# Patient Record
Sex: FEMALE | Race: WHITE | NOT HISPANIC OR LATINO | Employment: OTHER | URBAN - METROPOLITAN AREA
[De-identification: names, ages, dates, MRNs, and addresses within clinical notes are randomized per-mention and may not be internally consistent; named-entity substitution may affect disease eponyms.]

---

## 2023-02-28 PROBLEM — R05.8 PRODUCTIVE COUGH: Status: ACTIVE | Noted: 2023-02-28

## 2023-02-28 PROBLEM — R09.89 CHEST CONGESTION: Status: ACTIVE | Noted: 2023-02-28

## 2023-02-28 PROBLEM — E78.5 HYPERLIPIDEMIA: Status: ACTIVE | Noted: 2023-02-28

## 2023-02-28 PROBLEM — E03.9 HYPOTHYROIDISM: Status: ACTIVE | Noted: 2023-02-28

## 2023-02-28 PROBLEM — E11.9 DIABETES MELLITUS (MULTI): Status: ACTIVE | Noted: 2023-02-28

## 2023-02-28 PROBLEM — I25.10 CAD IN NATIVE ARTERY: Status: ACTIVE | Noted: 2023-02-28

## 2023-02-28 PROBLEM — I50.41 ACUTE COMBINED SYSTOLIC AND DIASTOLIC CONGESTIVE HEART FAILURE (MULTI): Status: ACTIVE | Noted: 2023-02-28

## 2023-02-28 PROBLEM — Z95.1 S/P CABG X 4: Status: ACTIVE | Noted: 2023-02-28

## 2023-02-28 PROBLEM — G47.33 OBSTRUCTIVE SLEEP APNEA (ADULT) (PEDIATRIC): Status: ACTIVE | Noted: 2023-02-28

## 2023-02-28 PROBLEM — I10 ESSENTIAL HYPERTENSION: Status: ACTIVE | Noted: 2023-02-28

## 2023-02-28 PROBLEM — H81.10 BPPV (BENIGN PAROXYSMAL POSITIONAL VERTIGO), UNSPECIFIED LATERALITY: Status: ACTIVE | Noted: 2023-02-28

## 2023-02-28 PROBLEM — I50.9 CHF (CONGESTIVE HEART FAILURE) (MULTI): Status: ACTIVE | Noted: 2023-02-28

## 2023-02-28 PROBLEM — R53.81 PHYSICAL DECONDITIONING: Status: ACTIVE | Noted: 2023-02-28

## 2023-02-28 PROBLEM — M16.11 PRIMARY OSTEOARTHRITIS OF RIGHT HIP: Status: ACTIVE | Noted: 2023-02-28

## 2023-02-28 PROBLEM — E16.0 DRUG-INDUCED HYPOGLYCEMIA: Status: ACTIVE | Noted: 2023-02-28

## 2023-02-28 PROBLEM — R06.02 SOB (SHORTNESS OF BREATH) ON EXERTION: Status: ACTIVE | Noted: 2023-02-28

## 2023-02-28 PROBLEM — S21.102A: Status: ACTIVE | Noted: 2023-02-28

## 2023-02-28 PROBLEM — E53.8 VITAMIN B 12 DEFICIENCY: Status: ACTIVE | Noted: 2023-02-28

## 2023-02-28 PROBLEM — I42.9 CARDIOMYOPATHY (MULTI): Status: ACTIVE | Noted: 2023-02-28

## 2023-02-28 PROBLEM — K21.9 MILD ACID REFLUX: Status: ACTIVE | Noted: 2023-02-28

## 2023-02-28 PROBLEM — D50.9 IRON DEFICIENCY ANEMIA, UNSPECIFIED: Status: ACTIVE | Noted: 2023-02-28

## 2023-02-28 PROBLEM — I48.0 PAROXYSMAL ATRIAL FIBRILLATION (MULTI): Status: ACTIVE | Noted: 2023-02-28

## 2023-02-28 PROBLEM — F33.9 RECURRENT MAJOR DEPRESSIVE DISORDER (CMS-HCC): Status: ACTIVE | Noted: 2023-02-28

## 2023-02-28 PROBLEM — Z96.641 STATUS POST TOTAL REPLACEMENT OF RIGHT HIP: Status: ACTIVE | Noted: 2023-02-28

## 2023-02-28 RX ORDER — PANTOPRAZOLE SODIUM 40 MG/1
1 TABLET, DELAYED RELEASE ORAL DAILY
COMMUNITY
Start: 2022-05-19 | End: 2023-04-04 | Stop reason: SDUPTHER

## 2023-02-28 RX ORDER — SPIRONOLACTONE 25 MG/1
TABLET ORAL DAILY
COMMUNITY
End: 2023-03-21 | Stop reason: SDUPTHER

## 2023-02-28 RX ORDER — LEVOTHYROXINE SODIUM 50 UG/1
1 TABLET ORAL DAILY
COMMUNITY
Start: 2018-02-27 | End: 2023-04-04 | Stop reason: SDUPTHER

## 2023-02-28 RX ORDER — METFORMIN HYDROCHLORIDE 500 MG/1
2 TABLET ORAL 2 TIMES DAILY
COMMUNITY
Start: 2018-06-25 | End: 2023-04-24 | Stop reason: SDUPTHER

## 2023-02-28 RX ORDER — SERTRALINE HYDROCHLORIDE 100 MG/1
1 TABLET, FILM COATED ORAL DAILY
COMMUNITY
Start: 2018-06-25 | End: 2023-04-04 | Stop reason: SDUPTHER

## 2023-02-28 RX ORDER — ALBUTEROL SULFATE 90 UG/1
2 AEROSOL, METERED RESPIRATORY (INHALATION) EVERY 4 HOURS PRN
COMMUNITY
End: 2024-01-01 | Stop reason: SDUPTHER

## 2023-02-28 RX ORDER — DULOXETIN HYDROCHLORIDE 60 MG/1
1 CAPSULE, DELAYED RELEASE ORAL DAILY
COMMUNITY
Start: 2020-05-13 | End: 2023-08-15 | Stop reason: SDUPTHER

## 2023-02-28 RX ORDER — ATORVASTATIN CALCIUM 80 MG/1
1 TABLET, FILM COATED ORAL NIGHTLY
COMMUNITY
Start: 2022-02-23 | End: 2024-01-19 | Stop reason: SDUPTHER

## 2023-02-28 RX ORDER — MONTELUKAST SODIUM 10 MG/1
TABLET ORAL
COMMUNITY
End: 2023-05-01 | Stop reason: SDUPTHER

## 2023-02-28 RX ORDER — GUAIFENESIN 600 MG/1
1200 TABLET, EXTENDED RELEASE ORAL EVERY 12 HOURS PRN
COMMUNITY
End: 2023-04-20 | Stop reason: SDUPTHER

## 2023-02-28 RX ORDER — SACUBITRIL AND VALSARTAN 24; 26 MG/1; MG/1
1 TABLET, FILM COATED ORAL 2 TIMES DAILY
COMMUNITY
Start: 2022-02-23 | End: 2023-06-19 | Stop reason: ALTCHOICE

## 2023-02-28 RX ORDER — METFORMIN HYDROCHLORIDE 1000 MG/1
1000 TABLET ORAL 2 TIMES DAILY
COMMUNITY
End: 2023-04-24 | Stop reason: SDUPTHER

## 2023-02-28 RX ORDER — FUROSEMIDE 20 MG/1
1 TABLET ORAL DAILY
COMMUNITY
End: 2023-11-07

## 2023-02-28 RX ORDER — CARVEDILOL 3.12 MG/1
1 TABLET ORAL
COMMUNITY
Start: 2022-02-23 | End: 2023-09-19 | Stop reason: ALTCHOICE

## 2023-03-08 ENCOUNTER — OFFICE VISIT (OUTPATIENT)
Dept: PRIMARY CARE | Facility: CLINIC | Age: 73
End: 2023-03-08
Payer: MEDICARE

## 2023-03-08 VITALS
RESPIRATION RATE: 14 BRPM | HEART RATE: 74 BPM | BODY MASS INDEX: 31.1 KG/M2 | HEIGHT: 62 IN | SYSTOLIC BLOOD PRESSURE: 110 MMHG | DIASTOLIC BLOOD PRESSURE: 60 MMHG | OXYGEN SATURATION: 98 % | WEIGHT: 169 LBS

## 2023-03-08 DIAGNOSIS — K21.9 MILD ACID REFLUX: ICD-10-CM

## 2023-03-08 DIAGNOSIS — E11.22 TYPE 2 DIABETES MELLITUS WITH STAGE 3A CHRONIC KIDNEY DISEASE, WITHOUT LONG-TERM CURRENT USE OF INSULIN (MULTI): Primary | ICD-10-CM

## 2023-03-08 DIAGNOSIS — B37.0 ORAL THRUSH: ICD-10-CM

## 2023-03-08 DIAGNOSIS — E78.2 MIXED HYPERLIPIDEMIA: ICD-10-CM

## 2023-03-08 DIAGNOSIS — D50.8 OTHER IRON DEFICIENCY ANEMIA: ICD-10-CM

## 2023-03-08 DIAGNOSIS — I10 ESSENTIAL HYPERTENSION: ICD-10-CM

## 2023-03-08 DIAGNOSIS — R31.0 GROSS HEMATURIA: ICD-10-CM

## 2023-03-08 DIAGNOSIS — E03.8 OTHER SPECIFIED HYPOTHYROIDISM: ICD-10-CM

## 2023-03-08 DIAGNOSIS — N18.31 TYPE 2 DIABETES MELLITUS WITH STAGE 3A CHRONIC KIDNEY DISEASE, WITHOUT LONG-TERM CURRENT USE OF INSULIN (MULTI): Primary | ICD-10-CM

## 2023-03-08 DIAGNOSIS — G47.33 OBSTRUCTIVE SLEEP APNEA (ADULT) (PEDIATRIC): ICD-10-CM

## 2023-03-08 PROCEDURE — 1036F TOBACCO NON-USER: CPT | Performed by: STUDENT IN AN ORGANIZED HEALTH CARE EDUCATION/TRAINING PROGRAM

## 2023-03-08 PROCEDURE — 1159F MED LIST DOCD IN RCRD: CPT | Performed by: STUDENT IN AN ORGANIZED HEALTH CARE EDUCATION/TRAINING PROGRAM

## 2023-03-08 PROCEDURE — 3008F BODY MASS INDEX DOCD: CPT | Performed by: STUDENT IN AN ORGANIZED HEALTH CARE EDUCATION/TRAINING PROGRAM

## 2023-03-08 PROCEDURE — 3078F DIAST BP <80 MM HG: CPT | Performed by: STUDENT IN AN ORGANIZED HEALTH CARE EDUCATION/TRAINING PROGRAM

## 2023-03-08 PROCEDURE — 3074F SYST BP LT 130 MM HG: CPT | Performed by: STUDENT IN AN ORGANIZED HEALTH CARE EDUCATION/TRAINING PROGRAM

## 2023-03-08 PROCEDURE — 1160F RVW MEDS BY RX/DR IN RCRD: CPT | Performed by: STUDENT IN AN ORGANIZED HEALTH CARE EDUCATION/TRAINING PROGRAM

## 2023-03-08 PROCEDURE — 99214 OFFICE O/P EST MOD 30 MIN: CPT | Performed by: STUDENT IN AN ORGANIZED HEALTH CARE EDUCATION/TRAINING PROGRAM

## 2023-03-08 RX ORDER — NYSTATIN 100000 [USP'U]/ML
5 SUSPENSION ORAL 4 TIMES DAILY
Qty: 140 ML | Refills: 1 | Status: SHIPPED | OUTPATIENT
Start: 2023-03-08 | End: 2023-03-21 | Stop reason: SDUPTHER

## 2023-03-08 RX ORDER — NYSTATIN 100000 [USP'U]/ML
500000 SUSPENSION ORAL 4 TIMES DAILY
Qty: 140 ML | Refills: 1 | Status: SHIPPED
Start: 2023-03-08 | End: 2023-03-08

## 2023-03-08 ASSESSMENT — ENCOUNTER SYMPTOMS
NAUSEA: 0
ACTIVITY CHANGE: 0
FEVER: 0
FATIGUE: 0
SINUS PAIN: 0
ABDOMINAL PAIN: 0
OCCASIONAL FEELINGS OF UNSTEADINESS: 0
CONSTIPATION: 0
CONFUSION: 0
SLEEP DISTURBANCE: 0
DEPRESSION: 0
POLYDIPSIA: 0
LOSS OF SENSATION IN FEET: 0
POLYPHAGIA: 0
DIZZINESS: 0
HEADACHES: 0
NERVOUS/ANXIOUS: 0
COUGH: 0
BLOOD IN STOOL: 0
TROUBLE SWALLOWING: 0
WEAKNESS: 0
WHEEZING: 0
HEMATURIA: 0
WOUND: 0
ABDOMINAL DISTENTION: 0
CHILLS: 0
SHORTNESS OF BREATH: 0
EYE DISCHARGE: 0

## 2023-03-08 NOTE — ASSESSMENT & PLAN NOTE
Stable. Last A1c of 7.3%. we will repeat A1c today. Advised patient to cut back on soda. Based on A1c, can wean patient off some of her antidiabetic medication if she is willing to cut down on sugary drinks

## 2023-03-08 NOTE — PATIENT INSTRUCTIONS
Please call your eye specialist to have your eye checked     You have oral thrush. I will prescribe you oral nystatin swish and swallow/spit     We will obtain routine blood work and urine test     Follow up in 3 months

## 2023-03-08 NOTE — PROGRESS NOTES
Subjective   Patient ID: Victorina Adamson is a 73 y.o. female who presents for New Patient Visit (Patient comes in as a new patient to the provider.) and Cough (Patient states that she has a cough that she can not get rid of. 2 weeks ago patient went to the ER. ).  HPI  Past medical history of CAD, s/p CABG, type 2 diabetes, SHARON not tolerating CPAP, heart failure with EF of 35%, End-stage heart failure with multiple hospitalizations for shortness of breath present to transfer care from Dr. Romario Lafleur.    We reviewed her latest medication list per last hospital discharge  and reviewed with the patient  sertraline 100 mg   Levothyroxine 50 mcg  Duloxetine 60 mg once daily  Atorvastatin 80 mg  Pantoprazole 40 mg daily  Aspirin 81 mg daily  Sacubitril -valsartan 24 mg- 26 mg daily  Glipizide 5 mg   Metformin 1000 mg BID  Carvedilol 3.125 mg BID  Magnesium oxide 400 mg 1  tablet daily   Ferrous sulfate 325 mg daily  Empagliflozin 10 mg   Spironolactone 25 mg oral     Patient report that she is doing well. She continues to drink 2 cans of pepsi a day. Recommend patient to cut down.     She had 1 episode of blood in her urine about a week ago. It has not occurred again.     SHARON. She is not tolerating her CPAP. Has  an apt with sleep medicine.     Review of Systems   Constitutional:  Negative for activity change, chills, fatigue and fever.   HENT:  Negative for congestion, ear discharge, sinus pain and trouble swallowing.    Eyes:  Negative for discharge and visual disturbance.   Respiratory:  Negative for cough, shortness of breath and wheezing.    Cardiovascular:  Negative for chest pain and leg swelling.   Gastrointestinal:  Negative for abdominal distention, abdominal pain, blood in stool, constipation and nausea.   Endocrine: Negative for polydipsia and polyphagia.   Genitourinary:  Negative for hematuria.   Musculoskeletal:  Negative for gait problem.   Skin:  Negative for wound.   Allergic/Immunologic:  "Negative for food allergies.   Neurological:  Negative for dizziness, weakness and headaches.   Psychiatric/Behavioral:  Negative for confusion, sleep disturbance and suicidal ideas. The patient is not nervous/anxious.        Objective   Visit Vitals  /60 (BP Location: Right arm, Patient Position: Sitting, BP Cuff Size: Adult)   Pulse 74   Resp 14   Ht 1.575 m (5' 2\")   Wt 76.7 kg (169 lb)   SpO2 98%   BMI 30.91 kg/m²   Smoking Status Never   BSA 1.83 m²      Physical Exam  Vitals and nursing note reviewed.   Constitutional:       Appearance: Normal appearance. She is normal weight.   HENT:      Head: Normocephalic and atraumatic.      Right Ear: Tympanic membrane normal.      Left Ear: Tympanic membrane and external ear normal.      Mouth/Throat:      Mouth: Mucous membranes are dry.      Pharynx: Oropharyngeal exudate present.      Comments: Oral thrush  Eyes:      Extraocular Movements: Extraocular movements intact.      Conjunctiva/sclera: Conjunctivae normal.   Cardiovascular:      Rate and Rhythm: Normal rate.      Pulses: Normal pulses.   Pulmonary:      Effort: Pulmonary effort is normal. No respiratory distress.      Breath sounds: Normal breath sounds.   Abdominal:      General: Bowel sounds are normal. There is no distension.      Palpations: Abdomen is soft. There is no mass.   Musculoskeletal:         General: Normal range of motion.      Cervical back: Normal range of motion and neck supple.   Skin:     General: Skin is warm and dry.   Neurological:      General: No focal deficit present.      Mental Status: She is alert. Mental status is at baseline.   Psychiatric:         Mood and Affect: Mood normal.         Assessment/Plan   Problem List Items Addressed This Visit          Nervous    Obstructive sleep apnea (adult) (pediatric)     Not tolerating SPAP. Has a follow up apt with sleep medicine         Relevant Orders    Follow Up In [facility] - PCP       Circulatory    Essential " hypertension     Well controlled. Continue current management. We will repeat CMP for mobitoring         Relevant Orders    Follow Up In [facility] - PCP    Comprehensive metabolic panel       Digestive    Mild acid reflux     Stable. Tolerating PPI. Continue current treatment         Relevant Orders    Follow Up In [facility] - PCP       Endocrine/Metabolic    Diabetes mellitus (CMS/HCC) - Primary     Stable. Last A1c of 7.3%. we will repeat A1c today. Advised patient to cut back on soda. Based on A1c, can wean patient off some of her antidiabetic medication if she is willing to cut down on sugary drinks         Relevant Orders    Follow Up In [facility] - PCP    Hemoglobin A1c    Hypothyroidism    Relevant Orders    Follow Up In [facility] - PCP    Tsh With Reflex To Free T4 If Abnormal       Hematologic    Iron deficiency anemia, unspecified    Relevant Orders    Follow Up [facility] - PCP       Other    Hyperlipidemia    Relevant Orders    Lipid panel     Other Visit Diagnoses       Gross hematuria        Relevant Orders    Follow Up In [facility] - PCP    Urinalysis with Reflex Microscopic    CBC    Comprehensive metabolic panel    Oral thrush        Relevant Medications    nystatin (Mycostatin) 100,000 unit/mL suspension

## 2023-03-16 LAB
ANION GAP IN SER/PLAS: 17 MMOL/L (ref 10–20)
CALCIUM (MG/DL) IN SER/PLAS: 9.4 MG/DL (ref 8.6–10.3)
CARBON DIOXIDE, TOTAL (MMOL/L) IN SER/PLAS: 26 MMOL/L (ref 21–32)
CHLORIDE (MMOL/L) IN SER/PLAS: 103 MMOL/L (ref 98–107)
CREATININE (MG/DL) IN SER/PLAS: 1.09 MG/DL (ref 0.5–1.05)
GFR FEMALE: 53 ML/MIN/1.73M2
GLUCOSE (MG/DL) IN SER/PLAS: 127 MG/DL (ref 74–99)
MAGNESIUM (MG/DL) IN SER/PLAS: 1.47 MG/DL (ref 1.6–2.4)
NATRIURETIC PEPTIDE B (PG/ML) IN SER/PLAS: 116 PG/ML (ref 0–99)
POTASSIUM (MMOL/L) IN SER/PLAS: 3.4 MMOL/L (ref 3.5–5.3)
SODIUM (MMOL/L) IN SER/PLAS: 143 MMOL/L (ref 136–145)
UREA NITROGEN (MG/DL) IN SER/PLAS: 20 MG/DL (ref 6–23)

## 2023-03-20 DIAGNOSIS — I50.9 OTHER CONGESTIVE HEART FAILURE (MULTI): ICD-10-CM

## 2023-03-20 DIAGNOSIS — I10 ESSENTIAL HYPERTENSION: Primary | ICD-10-CM

## 2023-03-20 DIAGNOSIS — B37.0 ORAL THRUSH: ICD-10-CM

## 2023-03-20 NOTE — TELEPHONE ENCOUNTER
**left on voicemail     Refill request: Montelukast 10mg 1 tablet daily and Spironolactone 25mg 1 tablet daily     No pharmacy left on voicemail

## 2023-03-21 DIAGNOSIS — E83.42 HYPOMAGNESEMIA: Primary | ICD-10-CM

## 2023-03-21 RX ORDER — VITS A,C,E/LUTEIN/MINERALS 300MCG-200
200 TABLET ORAL DAILY
Qty: 5 TABLET | Refills: 0 | Status: SHIPPED | OUTPATIENT
Start: 2023-03-21 | End: 2023-03-22 | Stop reason: SDUPTHER

## 2023-03-22 DIAGNOSIS — E83.42 HYPOMAGNESEMIA: ICD-10-CM

## 2023-03-22 RX ORDER — VITS A,C,E/LUTEIN/MINERALS 300MCG-200
200 TABLET ORAL DAILY
Qty: 5 TABLET | Refills: 0 | Status: SHIPPED | OUTPATIENT
Start: 2023-03-22 | End: 2023-04-04 | Stop reason: SDUPTHER

## 2023-03-24 RX ORDER — SPIRONOLACTONE 25 MG/1
25 TABLET ORAL DAILY
Qty: 90 TABLET | Refills: 1 | Status: SHIPPED | OUTPATIENT
Start: 2023-03-24 | End: 2023-12-08 | Stop reason: ALTCHOICE

## 2023-03-24 RX ORDER — NYSTATIN 100000 [USP'U]/ML
5 SUSPENSION ORAL 4 TIMES DAILY
Qty: 140 ML | Refills: 1 | Status: SHIPPED | OUTPATIENT
Start: 2023-03-24 | End: 2023-03-31

## 2023-03-27 ENCOUNTER — APPOINTMENT (OUTPATIENT)
Dept: PRIMARY CARE | Facility: CLINIC | Age: 73
End: 2023-03-27
Payer: COMMERCIAL

## 2023-03-31 LAB — POTASSIUM (MMOL/L) IN SER/PLAS: 4.1 MMOL/L (ref 3.5–5.3)

## 2023-04-03 DIAGNOSIS — E03.8 OTHER SPECIFIED HYPOTHYROIDISM: Primary | ICD-10-CM

## 2023-04-03 DIAGNOSIS — F41.9 ANXIETY: ICD-10-CM

## 2023-04-03 DIAGNOSIS — K21.9 MILD ACID REFLUX: ICD-10-CM

## 2023-04-03 NOTE — TELEPHONE ENCOUNTER
Patient called with several refill request:  Levothyroxine 50mcg daily   Magnesium 200mg daily   Pantoprazole 40mg daily   Entresto 24-26 daily   Sertraline 100mg daily   Ferrous Sulfate 325 daily   Folic Acid 1mg daily    **Patient isn't exactly sure what she should be taking and what she shouldn't be-she is going off her discharge paper from the hospital. She will call cardiology for the meds they prescribe and touch base with them. Do you want to refill all these?      [facility] mail order for 90 days    Plastic Surgeon Procedure Text (A): After obtaining clear surgical margins the patient was sent to plastics for surgical repair.  The patient understands they will receive post-surgical care and follow-up from the referring physician's office.

## 2023-04-04 RX ORDER — LEVOTHYROXINE SODIUM 50 UG/1
50 TABLET ORAL DAILY
Qty: 90 TABLET | Refills: 1 | Status: SHIPPED
Start: 2023-04-04 | End: 2023-04-05 | Stop reason: SDUPTHER

## 2023-04-04 RX ORDER — SACUBITRIL AND VALSARTAN 24; 26 MG/1; MG/1
1 TABLET, FILM COATED ORAL 2 TIMES DAILY
Qty: 90 TABLET | Refills: 1 | Status: CANCELLED | OUTPATIENT
Start: 2023-04-04

## 2023-04-04 RX ORDER — FERROUS SULFATE 325(65) MG
65 TABLET ORAL ONCE
Status: CANCELLED | OUTPATIENT
Start: 2023-04-04 | End: 2023-04-04

## 2023-04-04 RX ORDER — SERTRALINE HYDROCHLORIDE 100 MG/1
100 TABLET, FILM COATED ORAL DAILY
Qty: 90 TABLET | Refills: 1 | Status: SHIPPED
Start: 2023-04-04 | End: 2023-04-05 | Stop reason: SDUPTHER

## 2023-04-04 RX ORDER — VITS A,C,E/LUTEIN/MINERALS 300MCG-200
200 TABLET ORAL DAILY
Qty: 5 TABLET | Refills: 0 | Status: SHIPPED
Start: 2023-04-04 | End: 2023-04-05 | Stop reason: ALTCHOICE

## 2023-04-04 RX ORDER — FOLIC ACID 1 MG/1
1 TABLET ORAL DAILY
Status: CANCELLED | OUTPATIENT
Start: 2023-04-04

## 2023-04-04 RX ORDER — PANTOPRAZOLE SODIUM 40 MG/1
40 TABLET, DELAYED RELEASE ORAL DAILY
Qty: 90 TABLET | Refills: 1 | Status: SHIPPED
Start: 2023-04-04 | End: 2023-04-05 | Stop reason: SDUPTHER

## 2023-04-05 PROBLEM — F41.9 ANXIETY: Status: ACTIVE | Noted: 2023-04-05

## 2023-04-05 RX ORDER — LEVOTHYROXINE SODIUM 50 UG/1
50 TABLET ORAL DAILY
Qty: 90 TABLET | Refills: 1 | Status: SHIPPED
Start: 2023-04-05 | End: 2023-04-05 | Stop reason: SDUPTHER

## 2023-04-05 RX ORDER — SERTRALINE HYDROCHLORIDE 100 MG/1
100 TABLET, FILM COATED ORAL DAILY
Qty: 90 TABLET | Refills: 1 | Status: SHIPPED
Start: 2023-04-05 | End: 2023-04-05 | Stop reason: SDUPTHER

## 2023-04-05 RX ORDER — PANTOPRAZOLE SODIUM 40 MG/1
40 TABLET, DELAYED RELEASE ORAL DAILY
Qty: 90 TABLET | Refills: 1 | Status: SHIPPED | OUTPATIENT
Start: 2023-04-05 | End: 2023-09-18

## 2023-04-05 RX ORDER — PANTOPRAZOLE SODIUM 40 MG/1
40 TABLET, DELAYED RELEASE ORAL DAILY
Qty: 90 TABLET | Refills: 1 | Status: SHIPPED
Start: 2023-04-05 | End: 2023-04-05 | Stop reason: SDUPTHER

## 2023-04-05 RX ORDER — SERTRALINE HYDROCHLORIDE 100 MG/1
100 TABLET, FILM COATED ORAL DAILY
Qty: 90 TABLET | Refills: 1 | Status: SHIPPED | OUTPATIENT
Start: 2023-04-05 | End: 2023-09-18

## 2023-04-05 RX ORDER — LEVOTHYROXINE SODIUM 50 UG/1
50 TABLET ORAL DAILY
Qty: 90 TABLET | Refills: 1 | Status: SHIPPED | OUTPATIENT
Start: 2023-04-05 | End: 2023-07-25 | Stop reason: SDUPTHER

## 2023-04-20 ENCOUNTER — OFFICE VISIT (OUTPATIENT)
Dept: PRIMARY CARE | Facility: CLINIC | Age: 73
End: 2023-04-20
Payer: MEDICARE

## 2023-04-20 VITALS
HEART RATE: 77 BPM | RESPIRATION RATE: 18 BRPM | DIASTOLIC BLOOD PRESSURE: 80 MMHG | BODY MASS INDEX: 30.91 KG/M2 | SYSTOLIC BLOOD PRESSURE: 110 MMHG | HEIGHT: 62 IN | WEIGHT: 168 LBS | OXYGEN SATURATION: 97 %

## 2023-04-20 DIAGNOSIS — R05.3 CHRONIC COUGH: ICD-10-CM

## 2023-04-20 DIAGNOSIS — N18.31 TYPE 2 DIABETES MELLITUS WITH STAGE 3A CHRONIC KIDNEY DISEASE, WITHOUT LONG-TERM CURRENT USE OF INSULIN (MULTI): ICD-10-CM

## 2023-04-20 DIAGNOSIS — I50.9 OTHER CONGESTIVE HEART FAILURE (MULTI): ICD-10-CM

## 2023-04-20 DIAGNOSIS — E11.22 TYPE 2 DIABETES MELLITUS WITH STAGE 3A CHRONIC KIDNEY DISEASE, WITHOUT LONG-TERM CURRENT USE OF INSULIN (MULTI): ICD-10-CM

## 2023-04-20 DIAGNOSIS — Z23 NEED FOR TDAP VACCINATION: Primary | ICD-10-CM

## 2023-04-20 DIAGNOSIS — I10 ESSENTIAL HYPERTENSION: ICD-10-CM

## 2023-04-20 PROCEDURE — 99214 OFFICE O/P EST MOD 30 MIN: CPT | Performed by: STUDENT IN AN ORGANIZED HEALTH CARE EDUCATION/TRAINING PROGRAM

## 2023-04-20 PROCEDURE — 3008F BODY MASS INDEX DOCD: CPT | Performed by: STUDENT IN AN ORGANIZED HEALTH CARE EDUCATION/TRAINING PROGRAM

## 2023-04-20 PROCEDURE — 90471 IMMUNIZATION ADMIN: CPT | Performed by: STUDENT IN AN ORGANIZED HEALTH CARE EDUCATION/TRAINING PROGRAM

## 2023-04-20 PROCEDURE — 3079F DIAST BP 80-89 MM HG: CPT | Performed by: STUDENT IN AN ORGANIZED HEALTH CARE EDUCATION/TRAINING PROGRAM

## 2023-04-20 PROCEDURE — 1160F RVW MEDS BY RX/DR IN RCRD: CPT | Performed by: STUDENT IN AN ORGANIZED HEALTH CARE EDUCATION/TRAINING PROGRAM

## 2023-04-20 PROCEDURE — 1159F MED LIST DOCD IN RCRD: CPT | Performed by: STUDENT IN AN ORGANIZED HEALTH CARE EDUCATION/TRAINING PROGRAM

## 2023-04-20 PROCEDURE — 3074F SYST BP LT 130 MM HG: CPT | Performed by: STUDENT IN AN ORGANIZED HEALTH CARE EDUCATION/TRAINING PROGRAM

## 2023-04-20 PROCEDURE — 1036F TOBACCO NON-USER: CPT | Performed by: STUDENT IN AN ORGANIZED HEALTH CARE EDUCATION/TRAINING PROGRAM

## 2023-04-20 PROCEDURE — 90715 TDAP VACCINE 7 YRS/> IM: CPT | Performed by: STUDENT IN AN ORGANIZED HEALTH CARE EDUCATION/TRAINING PROGRAM

## 2023-04-20 RX ORDER — GUAIFENESIN 600 MG/1
600 TABLET, EXTENDED RELEASE ORAL EVERY 12 HOURS PRN
Qty: 20 TABLET | Refills: 0 | Status: SHIPPED | OUTPATIENT
Start: 2023-04-20 | End: 2023-04-30

## 2023-04-20 RX ORDER — BENZONATATE 100 MG/1
100 CAPSULE ORAL 2 TIMES DAILY PRN
Qty: 60 CAPSULE | Refills: 1 | Status: SHIPPED | OUTPATIENT
Start: 2023-04-20 | End: 2023-06-19

## 2023-04-20 RX ORDER — DOXYCYCLINE 100 MG/1
100 CAPSULE ORAL 2 TIMES DAILY
Qty: 14 CAPSULE | Refills: 0 | Status: CANCELLED | OUTPATIENT
Start: 2023-04-20 | End: 2023-04-27

## 2023-04-20 ASSESSMENT — ENCOUNTER SYMPTOMS
NERVOUS/ANXIOUS: 0
COUGH: 1
CONFUSION: 0
WEAKNESS: 0
POLYPHAGIA: 0
ABDOMINAL DISTENTION: 0
SLEEP DISTURBANCE: 0
HEADACHES: 0
SINUS PAIN: 0
NAUSEA: 0
ACTIVITY CHANGE: 0
FEVER: 0
HEMATURIA: 0
SHORTNESS OF BREATH: 0
DIZZINESS: 0
EYE DISCHARGE: 0
TROUBLE SWALLOWING: 0
CHILLS: 0
POLYDIPSIA: 0
BLOOD IN STOOL: 0
LOSS OF SENSATION IN FEET: 0
CONSTIPATION: 0
WHEEZING: 0
WOUND: 0
FATIGUE: 0
DEPRESSION: 0
OCCASIONAL FEELINGS OF UNSTEADINESS: 0
ABDOMINAL PAIN: 0

## 2023-04-20 ASSESSMENT — PATIENT HEALTH QUESTIONNAIRE - PHQ9
2. FEELING DOWN, DEPRESSED OR HOPELESS: NOT AT ALL
1. LITTLE INTEREST OR PLEASURE IN DOING THINGS: NOT AT ALL
SUM OF ALL RESPONSES TO PHQ9 QUESTIONS 1 AND 2: 0

## 2023-04-20 NOTE — PROGRESS NOTES
Subjective   Patient ID: Victorina Adamson is a 73 y.o. female who presents for Cough (Patient states that she has had a cough for over a month and green mucus is coming up.).  HPI  Past medical history of CAD, s/p CABG, type 2 diabetes, SHARON not tolerating CPAP, heart failure with EF of 35%, End-stage heart failure with multiple hospitalizations for shortness of breath present for evaluation of cough.  She had Mucinex prescribed that has not helped.  Notes cough started about 2 months ago.  She was recently started on Entresto in February 2023 after she had a heart attack and heart failure.  She is not up-to-date with Tdap.    Symptoms:  He denies Fever, chills, and chest pain. He denies SOB, and is not a habitual alcohol or tobacco user.    Medications: Reviewed and updated      Review of Systems   Constitutional:  Negative for activity change, chills, fatigue and fever.   HENT:  Negative for congestion, ear discharge, sinus pain and trouble swallowing.    Eyes:  Negative for discharge and visual disturbance.   Respiratory:  Positive for cough. Negative for shortness of breath and wheezing.    Cardiovascular:  Negative for chest pain and leg swelling.   Gastrointestinal:  Negative for abdominal distention, abdominal pain, blood in stool, constipation and nausea.   Endocrine: Negative for polydipsia and polyphagia.   Genitourinary:  Negative for hematuria.   Musculoskeletal:  Negative for gait problem.   Skin:  Negative for wound.   Allergic/Immunologic: Negative for food allergies.   Neurological:  Negative for dizziness, weakness and headaches.   Psychiatric/Behavioral:  Negative for confusion, sleep disturbance and suicidal ideas. The patient is not nervous/anxious.        Objective   Physical Exam  Vitals and nursing note reviewed.   Constitutional:       Appearance: Normal appearance. She is obese.   HENT:      Head: Normocephalic and atraumatic.      Right Ear: Tympanic membrane normal.      Left Ear: Tympanic  membrane normal.      Mouth/Throat:      Mouth: Mucous membranes are dry.   Eyes:      Extraocular Movements: Extraocular movements intact.      Conjunctiva/sclera: Conjunctivae normal.   Cardiovascular:      Rate and Rhythm: Normal rate.      Pulses: Normal pulses.   Pulmonary:      Effort: Pulmonary effort is normal. No respiratory distress.      Breath sounds: Normal breath sounds.      Comments: Breathing effort.  She is not in respiratory distress.  No accessory muscle use.  Abdominal:      General: Bowel sounds are normal. There is no distension.      Palpations: Abdomen is soft. There is no mass.   Musculoskeletal:         General: Normal range of motion.      Cervical back: Normal range of motion and neck supple.   Skin:     General: Skin is warm and dry.   Neurological:      General: No focal deficit present.      Mental Status: She is alert. Mental status is at baseline.   Psychiatric:         Mood and Affect: Mood normal.         Assessment/Plan   Will obtain CXR to evaluate cardiopulmonary pathology.  Unclear if cough is related to Entresto, patient will follow-up with cardiology for further evaluation  We will give Tdap to provide vaccination against pertussis  Prescribed Tessalon Perle and Mucinex  Home oxygen evaluation performed in the office today. Patient was not hypoxic at rest or on exertion. She is not in respiratory distress  DM2, stable. Continue current management for DM2   HTN. Well controlled. Continue current medication     Problem List Items Addressed This Visit       CHF (congestive heart failure) (CMS/HCC)    Relevant Orders    Follow Up In [facility] - PCP    Diabetes mellitus (CMS/HCC)    Relevant Orders    Follow Up In [facility] - PCP    Essential hypertension    Relevant Orders    Follow Up In [facility] - PCP     Other Visit Diagnoses       Need for Tdap vaccination    -  Primary    Relevant Orders    Tdap vaccine, age 10 years and older (BOOSTRIX)  (Completed)    Follow Up In [facility] - PCP    Chronic cough        Relevant Medications    guaiFENesin (Mucinex) 600 mg 12 hr tablet    benzonatate (Tessalon) 100 mg capsule    Other Relevant Orders    XR chest 4+ views    Follow Up In [facility] - PCP

## 2023-04-20 NOTE — PATIENT INSTRUCTIONS
It was nice meeting you today.     Please call Cardiology and make appointment to have your entresto switched out    We will obtain CXR to evaluate you lungs      Exercise helps improve your health: I encourage you to slowly increase your activity level 10 -30 min as tolerated 3-5 times per week.     Diet: You can improve your health with low, low-fat and high-fiber diet.    DASH diet can help improve your blood pressure and overall health.     If you need anything or have any questions, please call the clinic at [phone]     Follow up as scheduled in 1 month

## 2023-04-21 ENCOUNTER — TELEPHONE (OUTPATIENT)
Dept: PRIMARY CARE | Facility: CLINIC | Age: 73
End: 2023-04-21
Payer: COMMERCIAL

## 2023-04-21 DIAGNOSIS — R05.3 CHRONIC COUGH: Primary | ICD-10-CM

## 2023-04-21 NOTE — TELEPHONE ENCOUNTER
Rosanne with radiology called and they need a new order for a chest xray with 2 views. The original one ordered was for 4 views with a diagnosis of cough, she said they only do 2 views for that

## 2023-04-24 ENCOUNTER — TELEPHONE (OUTPATIENT)
Dept: PRIMARY CARE | Facility: CLINIC | Age: 73
End: 2023-04-24
Payer: COMMERCIAL

## 2023-04-24 DIAGNOSIS — N18.31 TYPE 2 DIABETES MELLITUS WITH STAGE 3A CHRONIC KIDNEY DISEASE, WITHOUT LONG-TERM CURRENT USE OF INSULIN (MULTI): Primary | ICD-10-CM

## 2023-04-24 DIAGNOSIS — E11.22 TYPE 2 DIABETES MELLITUS WITH STAGE 3A CHRONIC KIDNEY DISEASE, WITHOUT LONG-TERM CURRENT USE OF INSULIN (MULTI): Primary | ICD-10-CM

## 2023-04-24 RX ORDER — METFORMIN HYDROCHLORIDE 1000 MG/1
1000 TABLET ORAL 2 TIMES DAILY
Qty: 180 TABLET | Refills: 1 | Status: SHIPPED | OUTPATIENT
Start: 2023-04-24 | End: 2023-11-30

## 2023-04-24 NOTE — TELEPHONE ENCOUNTER
----- Message from Sean Persaud DO sent at 4/23/2023  7:06 PM EDT -----  Please call patient and let her know CXR is normal. No pneumonia. To continue what was discussed during visit. thanks

## 2023-04-27 DIAGNOSIS — Z76.0 MEDICATION REFILL: Primary | ICD-10-CM

## 2023-04-27 NOTE — TELEPHONE ENCOUNTER
Patient was prescribed Montelukast 10mg once a day at bedtime-patient isn't aware of of taking this medication, might of been put on this during a hospital stay. Patient has had a cough and Jade feels this may help. Did you want the patient taking the medication?      [facility] (no location)- 30 day  [facility] pharmacy- 90 day

## 2023-05-01 RX ORDER — MONTELUKAST SODIUM 10 MG/1
10 TABLET ORAL NIGHTLY
Qty: 30 TABLET | Refills: 1 | Status: SHIPPED | OUTPATIENT
Start: 2023-05-01 | End: 2023-09-19 | Stop reason: WASHOUT

## 2023-05-10 ENCOUNTER — LAB (OUTPATIENT)
Dept: LAB | Facility: LAB | Age: 73
End: 2023-05-10
Payer: MEDICARE

## 2023-05-10 DIAGNOSIS — R31.0 GROSS HEMATURIA: ICD-10-CM

## 2023-05-10 DIAGNOSIS — Z01.818 PREOP EXAMINATION: Primary | ICD-10-CM

## 2023-05-10 DIAGNOSIS — N18.31 TYPE 2 DIABETES MELLITUS WITH STAGE 3A CHRONIC KIDNEY DISEASE, WITHOUT LONG-TERM CURRENT USE OF INSULIN (MULTI): ICD-10-CM

## 2023-05-10 DIAGNOSIS — E11.22 TYPE 2 DIABETES MELLITUS WITH STAGE 3A CHRONIC KIDNEY DISEASE, WITHOUT LONG-TERM CURRENT USE OF INSULIN (MULTI): ICD-10-CM

## 2023-05-10 DIAGNOSIS — I10 ESSENTIAL HYPERTENSION: ICD-10-CM

## 2023-05-10 DIAGNOSIS — E03.8 OTHER SPECIFIED HYPOTHYROIDISM: ICD-10-CM

## 2023-05-10 DIAGNOSIS — E78.2 MIXED HYPERLIPIDEMIA: ICD-10-CM

## 2023-05-10 LAB
ALANINE AMINOTRANSFERASE (SGPT) (U/L) IN SER/PLAS: 13 U/L (ref 7–45)
ALBUMIN (G/DL) IN SER/PLAS: 4 G/DL (ref 3.4–5)
ALKALINE PHOSPHATASE (U/L) IN SER/PLAS: 82 U/L (ref 33–136)
ANION GAP IN SER/PLAS: 13 MMOL/L (ref 10–20)
APPEARANCE, URINE: CLEAR
ASPARTATE AMINOTRANSFERASE (SGOT) (U/L) IN SER/PLAS: 18 U/L (ref 9–39)
BACTERIA, URINE: ABNORMAL /HPF
BILIRUBIN TOTAL (MG/DL) IN SER/PLAS: 0.4 MG/DL (ref 0–1.2)
BILIRUBIN, URINE: NEGATIVE
BLOOD, URINE: ABNORMAL
CALCIUM (MG/DL) IN SER/PLAS: 9.4 MG/DL (ref 8.6–10.3)
CARBON DIOXIDE, TOTAL (MMOL/L) IN SER/PLAS: 25 MMOL/L (ref 21–32)
CHLORIDE (MMOL/L) IN SER/PLAS: 108 MMOL/L (ref 98–107)
CHOLESTEROL (MG/DL) IN SER/PLAS: 192 MG/DL (ref 0–199)
CHOLESTEROL IN HDL (MG/DL) IN SER/PLAS: 68.7 MG/DL
CHOLESTEROL/HDL RATIO: 2.8
COLOR, URINE: YELLOW
CREATININE (MG/DL) IN SER/PLAS: 0.79 MG/DL (ref 0.5–1.05)
ERYTHROCYTE DISTRIBUTION WIDTH (RATIO) BY AUTOMATED COUNT: 22.4 % (ref 11.5–14.5)
ERYTHROCYTE MEAN CORPUSCULAR HEMOGLOBIN CONCENTRATION (G/DL) BY AUTOMATED: 30.9 G/DL (ref 32–36)
ERYTHROCYTE MEAN CORPUSCULAR VOLUME (FL) BY AUTOMATED COUNT: 84 FL (ref 80–100)
ERYTHROCYTES (10*6/UL) IN BLOOD BY AUTOMATED COUNT: 4.97 X10E12/L (ref 4–5.2)
ESTIMATED AVERAGE GLUCOSE FOR HBA1C: 166 MG/DL
GFR FEMALE: 78 ML/MIN/1.73M2
GLUCOSE (MG/DL) IN SER/PLAS: 165 MG/DL (ref 74–99)
GLUCOSE, URINE: ABNORMAL MG/DL
HEMATOCRIT (%) IN BLOOD BY AUTOMATED COUNT: 41.7 % (ref 36–46)
HEMOGLOBIN (G/DL) IN BLOOD: 12.9 G/DL (ref 12–16)
HEMOGLOBIN A1C/HEMOGLOBIN TOTAL IN BLOOD: 7.4 %
HYALINE CASTS, URINE: ABNORMAL /LPF
KETONES, URINE: NEGATIVE MG/DL
LDL: 99 MG/DL (ref 0–99)
LEUKOCYTE ESTERASE, URINE: ABNORMAL
LEUKOCYTES (10*3/UL) IN BLOOD BY AUTOMATED COUNT: 8.6 X10E9/L (ref 4.4–11.3)
NITRITE, URINE: POSITIVE
PH, URINE: 5.5 (ref 5–8)
PLATELETS (10*3/UL) IN BLOOD AUTOMATED COUNT: 235 X10E9/L (ref 150–450)
POTASSIUM (MMOL/L) IN SER/PLAS: 3.7 MMOL/L (ref 3.5–5.3)
PROTEIN TOTAL: 6.9 G/DL (ref 6.4–8.2)
PROTEIN, URINE: ABNORMAL MG/DL
RBC, URINE: 102 /HPF (ref 0–5)
RENAL EPITHELIAL CELLS, URINE: 1 /HPF
SODIUM (MMOL/L) IN SER/PLAS: 142 MMOL/L (ref 136–145)
SPECIFIC GRAVITY, URINE: 1.02 (ref 1–1.03)
SQUAMOUS EPITHELIAL CELLS, URINE: 82 /HPF
THYROTROPIN (MIU/L) IN SER/PLAS BY DETECTION LIMIT <= 0.05 MIU/L: 2.63 MIU/L (ref 0.44–3.98)
TRIGLYCERIDE (MG/DL) IN SER/PLAS: 123 MG/DL (ref 0–149)
UREA NITROGEN (MG/DL) IN SER/PLAS: 18 MG/DL (ref 6–23)
UROBILINOGEN, URINE: <2 MG/DL (ref 0–1.9)
VLDL: 25 MG/DL (ref 0–40)
WBC, URINE: 89 /HPF (ref 0–5)

## 2023-05-10 PROCEDURE — 81001 URINALYSIS AUTO W/SCOPE: CPT

## 2023-05-10 PROCEDURE — 80061 LIPID PANEL: CPT

## 2023-05-10 PROCEDURE — 83036 HEMOGLOBIN GLYCOSYLATED A1C: CPT

## 2023-05-10 PROCEDURE — 84443 ASSAY THYROID STIM HORMONE: CPT

## 2023-05-10 PROCEDURE — 85027 COMPLETE CBC AUTOMATED: CPT

## 2023-05-10 PROCEDURE — 36415 COLL VENOUS BLD VENIPUNCTURE: CPT

## 2023-05-10 PROCEDURE — 80053 COMPREHEN METABOLIC PANEL: CPT

## 2023-05-15 ENCOUNTER — TELEPHONE (OUTPATIENT)
Dept: PRIMARY CARE | Facility: CLINIC | Age: 73
End: 2023-05-15

## 2023-06-19 ENCOUNTER — TELEMEDICINE (OUTPATIENT)
Dept: PRIMARY CARE | Facility: CLINIC | Age: 73
End: 2023-06-19
Payer: MEDICARE

## 2023-06-19 DIAGNOSIS — I25.718 ATHEROSCLEROSIS OF AUTOLOGOUS VEIN CORONARY ARTERY BYPASS GRAFT(S) WITH OTHER FORMS OF ANGINA PECTORIS (CMS-HCC): ICD-10-CM

## 2023-06-19 DIAGNOSIS — E03.8 OTHER SPECIFIED HYPOTHYROIDISM: ICD-10-CM

## 2023-06-19 DIAGNOSIS — Z12.31 ENCOUNTER FOR SCREENING MAMMOGRAM FOR MALIGNANT NEOPLASM OF BREAST: ICD-10-CM

## 2023-06-19 DIAGNOSIS — I50.9 OTHER CONGESTIVE HEART FAILURE (MULTI): ICD-10-CM

## 2023-06-19 DIAGNOSIS — I10 ESSENTIAL HYPERTENSION: ICD-10-CM

## 2023-06-19 DIAGNOSIS — R05.3 CHRONIC COUGH: ICD-10-CM

## 2023-06-19 DIAGNOSIS — E78.49 OTHER HYPERLIPIDEMIA: ICD-10-CM

## 2023-06-19 DIAGNOSIS — E11.22 TYPE 2 DIABETES MELLITUS WITH STAGE 3A CHRONIC KIDNEY DISEASE, WITHOUT LONG-TERM CURRENT USE OF INSULIN (MULTI): Primary | ICD-10-CM

## 2023-06-19 DIAGNOSIS — N18.31 TYPE 2 DIABETES MELLITUS WITH STAGE 3A CHRONIC KIDNEY DISEASE, WITHOUT LONG-TERM CURRENT USE OF INSULIN (MULTI): Primary | ICD-10-CM

## 2023-06-19 PROCEDURE — 99214 OFFICE O/P EST MOD 30 MIN: CPT | Performed by: STUDENT IN AN ORGANIZED HEALTH CARE EDUCATION/TRAINING PROGRAM

## 2023-06-19 ASSESSMENT — ENCOUNTER SYMPTOMS
TROUBLE SWALLOWING: 0
FATIGUE: 0
NAUSEA: 0
CONFUSION: 0
SLEEP DISTURBANCE: 0
POLYDIPSIA: 1
CONSTIPATION: 0
SHORTNESS OF BREATH: 0
FEVER: 0
ABDOMINAL PAIN: 0
HEMATURIA: 0
NERVOUS/ANXIOUS: 0
WHEEZING: 0
WOUND: 0
SINUS PAIN: 0
CHILLS: 0
ABDOMINAL DISTENTION: 0
COUGH: 1
EYE DISCHARGE: 0
HEADACHES: 0
POLYPHAGIA: 0
WEAKNESS: 0
ACTIVITY CHANGE: 0
DIZZINESS: 0
BLOOD IN STOOL: 0

## 2023-06-19 NOTE — PROGRESS NOTES
Subjective   Patient ID: Victorina Adamson is a 73 y.o. female who presents for No chief complaint on file..  HPI  73-year-old female with past medical history of CAD, s/p CABG, type 2 diabetes, SHARON not tolerating CPAP, heart failure with EF of 35%, End-stage heart failure with multiple hospitalizations for shortness of breath present for follow-up.  She was last seen on 04/20/2023.    At that time she was complaining of cough which was likely related to Entresto.  Patient was referred back to cardiology.  Patient saw her cardiology on 06/1/2023.  Entresto was discontinued due to cough and cost to patient.  Lisinopril was started.  Patient is still having cough somewhat decreased than previously.  Cough sometimes is exacerbated with laying flat.    Cardiology recommended echocardiogram which is still pending.  Patient is hoping to have it done in the next couple of weeks.    Medications and allergy list: Reviewed and updated    Previous labs and notes reviewed and discussed    Lifestyle :    - Tobacco history reviewed and updated  - Alcohol history reviewed and updated      Health care maintenance  - Screening. Screening mammogram discussed and ordered     Review of Systems   Constitutional:  Negative for activity change, chills, fatigue and fever.   HENT:  Negative for congestion, ear discharge, sinus pain and trouble swallowing.    Eyes:  Positive for visual disturbance (chronic). Negative for discharge.   Respiratory:  Positive for cough. Negative for shortness of breath and wheezing.    Cardiovascular:  Negative for chest pain and leg swelling.   Gastrointestinal:  Negative for abdominal distention, abdominal pain, blood in stool, constipation and nausea.   Endocrine: Positive for polydipsia. Negative for polyphagia.   Genitourinary:  Negative for hematuria.   Musculoskeletal:  Negative for gait problem.   Skin:  Negative for wound.   Allergic/Immunologic: Negative for food allergies.   Neurological:  Negative  for dizziness, weakness and headaches.   Psychiatric/Behavioral:  Negative for confusion, sleep disturbance and suicidal ideas. The patient is not nervous/anxious.        Objective   Physical Exam  Pulmonary:      Effort: Pulmonary effort is normal.   Neurological:      Mental Status: She is alert.         Assessment/Plan   Type 2 DM. A1c of 7.4%. stable . Mild polyuria. Will continue current treatment with Jardiance,.    CHF/hypertension/cough.  Did not tolerate Entresto due to cough.  Entresto switched to lisinopril.  Patient still symptomatic with cough.  Recommend patient contact cardiology patient will need to be switched to losartan as lisinopril is known to cause cough.    CAD s/p CABG. Stable. On carvedilol and tolerating. Continue current management by cardiology    Hyperlipidemia.  She is continued on atorvastatin and ezetimibe. Continue     Hypothyroidism.  Stable continue levothyroxine      Problem List Items Addressed This Visit       CHF (congestive heart failure) (CMS/HCC)    Diabetes mellitus (CMS/HCC) - Primary    Relevant Orders    Follow Up In [facility] - Pharmacy    Follow Up In [facility] - PCP    Hemoglobin A1c    Comprehensive metabolic panel    Essential hypertension    Hyperlipidemia    Relevant Orders    Follow Up In [facility] - Pharmacy    Follow Up In [facility] - PCP    Hypothyroidism    Relevant Orders    Follow Up In [facility] - Pharmacy    Follow Up In [facility] - PCP    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

## 2023-06-19 NOTE — PATIENT INSTRUCTIONS
It was nice meeting you today.     Follow up with cardiology to have lisinopril switched to losartan due to cough    Mammogram ordered     Will repeat blood work in 3 months      Exercise helps improve your health: I encourage you to slowly increase your activity level 10 -30 min as tolerated 3-5 times per week.     Diet: You can improve your health with low carbohydrate, low-fat and high-fiber diet.     DASH diet can help improve your blood pressure and overall health.    You can sign up for [employer] to view your result as well     If you need anything or have any questions, please call the clinic at [phone]     Follow up as scheduled  in 3 month or sooner if needed

## 2023-06-27 ENCOUNTER — TELEMEDICINE (OUTPATIENT)
Dept: PHARMACY | Facility: HOSPITAL | Age: 73
End: 2023-06-27
Payer: COMMERCIAL

## 2023-06-27 DIAGNOSIS — Z12.31 ENCOUNTER FOR SCREENING MAMMOGRAM FOR MALIGNANT NEOPLASM OF BREAST: ICD-10-CM

## 2023-06-27 DIAGNOSIS — E11.22 TYPE 2 DIABETES MELLITUS WITH STAGE 3A CHRONIC KIDNEY DISEASE, WITHOUT LONG-TERM CURRENT USE OF INSULIN (MULTI): ICD-10-CM

## 2023-06-27 DIAGNOSIS — G47.33 OBSTRUCTIVE SLEEP APNEA (ADULT) (PEDIATRIC): Primary | ICD-10-CM

## 2023-06-27 DIAGNOSIS — I50.41 ACUTE COMBINED SYSTOLIC AND DIASTOLIC CONGESTIVE HEART FAILURE (MULTI): ICD-10-CM

## 2023-06-27 DIAGNOSIS — R09.89 CHEST CONGESTION: ICD-10-CM

## 2023-06-27 DIAGNOSIS — E78.49 OTHER HYPERLIPIDEMIA: ICD-10-CM

## 2023-06-27 DIAGNOSIS — I25.718 ATHEROSCLEROSIS OF AUTOLOGOUS VEIN CORONARY ARTERY BYPASS GRAFT(S) WITH OTHER FORMS OF ANGINA PECTORIS (CMS-HCC): ICD-10-CM

## 2023-06-27 DIAGNOSIS — N18.31 TYPE 2 DIABETES MELLITUS WITH STAGE 3A CHRONIC KIDNEY DISEASE, WITHOUT LONG-TERM CURRENT USE OF INSULIN (MULTI): ICD-10-CM

## 2023-06-27 DIAGNOSIS — E03.8 OTHER SPECIFIED HYPOTHYROIDISM: ICD-10-CM

## 2023-06-27 RX ORDER — ACETAMINOPHEN 500 MG
1000 TABLET ORAL EVERY 6 HOURS PRN
COMMUNITY

## 2023-06-27 RX ORDER — POTASSIUM CHLORIDE 750 MG/1
10 TABLET, EXTENDED RELEASE ORAL DAILY
COMMUNITY
Start: 2023-06-15 | End: 2024-01-19 | Stop reason: WASHOUT

## 2023-06-27 RX ORDER — EZETIMIBE 10 MG/1
1 TABLET ORAL DAILY
COMMUNITY
Start: 2023-06-14 | End: 2024-01-19 | Stop reason: SDUPTHER

## 2023-06-27 RX ORDER — FERROUS SULFATE 325(65) MG
65 TABLET, DELAYED RELEASE (ENTERIC COATED) ORAL
COMMUNITY
End: 2024-05-30 | Stop reason: WASHOUT

## 2023-06-27 RX ORDER — CARVEDILOL 6.25 MG/1
1 TABLET ORAL
COMMUNITY
Start: 2022-02-23 | End: 2023-11-07

## 2023-06-27 RX ORDER — DICLOFENAC SODIUM 10 MG/G
GEL TOPICAL
COMMUNITY
Start: 2023-04-03 | End: 2024-01-19 | Stop reason: WASHOUT

## 2023-06-27 RX ORDER — ASPIRIN 81 MG/1
81 TABLET ORAL DAILY
COMMUNITY

## 2023-06-27 NOTE — PROGRESS NOTES
Transitional Care Management: Pharmacy    Subjective   Victorina Adamson is a 73 y.o. female who was referred to Clinical Pharmacy Team to complete TCM medication reconciliation prior to office visit with Sean Persaud DO on Unknown date. A comprehensive medication review was completed with the patient, patient had all medications during the telephone encounter.           Admission Date: 02/20/2023  Discharge Date: 02/23/23  Discharge Diagnosis: Acute on chronic biventricular heart failure with LVEF 35%  Discharged From: [facility]      Current Outpatient Medications on File Prior to Visit   Medication Sig Dispense Refill    acetaminophen (Tylenol) 500 mg tablet Take 2 tablets (1,000 mg) by mouth every 6 hours if needed for mild pain (1 - 3).      albuterol 90 mcg/actuation inhaler Inhale 2 puffs every 4 hours if needed for wheezing or shortness of breath (inhale 2 puffs into the lungs every 4 to 6 hours as needed).      apixaban (Eliquis) 5 mg tablet Take 1 tablet (5 mg) by mouth 2 times a day.      aspirin 81 mg EC tablet Take 1 tablet (81 mg) by mouth once daily.      atorvastatin (Lipitor) 80 mg tablet Take 1 tablet (80 mg) by mouth once daily at bedtime.      carvedilol (Coreg) 6.25 mg tablet Take 1 tablet (6.25 mg) by mouth 2 times a day with meals.      diclofenac sodium (Voltaren) 1 % gel gel APPLY 4 GRAMS TOPICALLY TO AFFECTED AREA(S) EVERY 6 HOURS AS NEEDED FOR PAIN      DULoxetine (Cymbalta) 60 mg DR capsule Take 1 capsule (60 mg) by mouth once daily.      empagliflozin (Jardiance) 25 mg Take by mouth.      ezetimibe (Zetia) 10 mg tablet Take 1 tablet (10 mg) by mouth once daily.      ferrous sulfate 325 (65 Fe) MG EC tablet Take 1 tablet (65 mg) by mouth 3 times a day with meals. Do not crush, chew, or split.      furosemide (Lasix) 20 mg tablet Take 1 tablet (20 mg) by mouth once daily.      levothyroxine (Synthroid, Levoxyl) 50 mcg tablet Take 1 tablet (50 mcg) by mouth once daily. 90 tablet 1    metFORMIN  (Glucophage) 1,000 mg tablet Take 1 tablet (1,000 mg) by mouth in the morning and 1 tablet (1,000 mg) before bedtime. 180 tablet 1    montelukast (Singulair) 10 mg tablet Take 1 tablet (10 mg) by mouth once daily at bedtime. 30 tablet 1    pantoprazole (ProtoNix) 40 mg EC tablet Take 1 tablet (40 mg) by mouth once daily. 90 tablet 1    potassium chloride CR 10 mEq ER tablet Take 1 tablet (10 mEq) by mouth once daily. Take with food.      sertraline (Zoloft) 100 mg tablet Take 1 tablet (100 mg) by mouth once daily. 90 tablet 1    carvedilol (Coreg) 3.125 mg tablet Take 1 tablet (3.125 mg) by mouth 2 times a day with meals.      diclofenac sodium 1 % kit Apply 4 g topically every 6 hours if needed (pain).      POTASSIUM CITRATE ORAL Take by mouth. Potassium Citrate (Elemental K) 99 MG Oral Capsule      spironolactone (Aldactone) 25 mg tablet Take 1 tablet (25 mg) by mouth once daily. (Patient not taking: Reported on 6/27/2023) 90 tablet 1    [DISCONTINUED] empagliflozin (Jardiance) 10 mg Take by mouth.       No current facility-administered medications on file prior to visit.      Allergies   Allergen Reactions    Flu Vac 2022 [id number]-[id number](Pf) Unknown    Gabapentin Unknown    Hydrocodone-Acetaminophen Unknown    Influenza Virus Vaccines Unknown    Lisinopril Cough    Oxycodone Unknown       [facility] [place], [place] - [address]  [address] [address]  Phone: [phone] Fax: [phone]    [facility] [address] [place], [place] - [address] [facility] [address]  [address]  [facility] [address]  Phone: [phone] Fax: [phone]       No issues reported in regards to accessibility adherence organization.      Objective     LAB  Wt Readings from Last 3 Encounters:   04/20/23 76.2 kg (168 lb)   03/23/23 75.3 kg (166 lb 1.6 oz)   03/10/23 76 kg (167 lb 9.6 oz)     Pulse Readings from Last 3 Encounters:   04/20/23 77   03/23/23 79   03/10/23 76     BP Readings  from Last 3 Encounters:   04/20/23 110/80   03/23/23 108/72   03/10/23 125/79      Lab Results   Component Value Date    BILITOT 0.4 05/10/2023    CALCIUM 9.4 05/10/2023    CO2 25 05/10/2023     (H) 05/10/2023    CREATININE 0.79 05/10/2023    CREATININE 1.09 (H) 03/16/2023    CREATININE CANCELED 02/24/2023    GLUCOSE 165 (H) 05/10/2023    ALKPHOS 82 05/10/2023    K 3.7 05/10/2023    PROT 6.9 05/10/2023     05/10/2023    AST 18 05/10/2023    ALT 13 05/10/2023    BUN 18 05/10/2023    ANIONGAP 13 05/10/2023    MG 1.47 (L) 03/16/2023    PHOS 4.8 05/03/2022    ALBUMIN 4.0 05/10/2023    GFRF 78 05/10/2023    GFRF 53 (A) 03/16/2023    GFRF CANCELED 02/24/2023    GFRMALE CANCELED 02/24/2023     Lab Results   Component Value Date    TRIG 123 05/10/2023    TRIG 154 (H) 10/25/2022    TRIG 166 (H) 04/22/2021    CHOL 192 05/10/2023    CHOL 243 (H) 10/25/2022    CHOL 155 04/22/2021    HDL 68.7 05/10/2023    HDL 72.8 10/25/2022    HDL 60.1 04/22/2021     Lab Results   Component Value Date    HGBA1C 7.4 (A) 05/10/2023    HGBA1C 7.3 (A) 10/25/2022    HGBA1C 6.4 (A) 02/19/2022       Assessment/Plan    Indication, effectiveness, safety and convenience of her medications were reviewed today. The patient's medical conditions were assessed, evaluated, and deemed meeting goals of drug therapy, with the following exceptions.      Medication Therapy Recommendations Needing Review  - Patient was on lisinopril ans is experiencing Cough. Patient was advised to stop lisinopril and contact PCP for an alternative medication.     Completed Medication Therapy Recommendations  - Patient said she could not afford Eliquis and does not want to use warfarin either due to constant monitoring.  - She was advised to convene with the PCP to discuss other alternatives.  - Patient advised to seek 's coupon if possible for Eliquis while navigating a permanent solution.     Comments/Recommendations to PCP:  Patient has affordability  issues for Eliquis, may seek insurance override coverage due to the importance of being on the medication.      Nasim Keith, PharmD   PGY1 Resident.    Verbal consent to manage patient's drug therapy was obtained from the patient and. They were informed they may decline to participate or withdraw from participation in pharmacy services at any time.

## 2023-07-06 NOTE — PROGRESS NOTES
I reviewed the progress note and agree with the resident’s findings and plans as written. Case discussed with resident.    Gunner Conroy, PharmD

## 2023-07-24 DIAGNOSIS — Z76.0 MEDICATION REFILL: Primary | ICD-10-CM

## 2023-07-24 RX ORDER — BLOOD-GLUCOSE METER
1 EACH MISCELLANEOUS SEE ADMIN INSTRUCTIONS
Qty: 1 EACH | Refills: 0 | Status: SHIPPED | OUTPATIENT
Start: 2023-07-24

## 2023-07-24 RX ORDER — LANCETS 33 GAUGE
1 EACH MISCELLANEOUS SEE ADMIN INSTRUCTIONS
Qty: 200 EACH | Refills: 0 | Status: SHIPPED | OUTPATIENT
Start: 2023-07-24 | End: 2023-10-06

## 2023-07-24 RX ORDER — CALCIUM CITRATE/VITAMIN D3 200MG-6.25
1 TABLET ORAL SEE ADMIN INSTRUCTIONS
Qty: 200 STRIP | Refills: 0 | Status: SHIPPED | OUTPATIENT
Start: 2023-07-24 | End: 2023-10-06

## 2023-07-24 RX ORDER — ISOPROPYL ALCOHOL 70 ML/100ML
1 SWAB TOPICAL SEE ADMIN INSTRUCTIONS
Qty: 200 EACH | Refills: 0 | Status: SHIPPED | OUTPATIENT
Start: 2023-07-24 | End: 2023-10-06

## 2023-07-24 RX ORDER — DEXTROSE 4 G
1 TABLET,CHEWABLE ORAL SEE ADMIN INSTRUCTIONS
Qty: 1 EACH | Refills: 0 | Status: SHIPPED | OUTPATIENT
Start: 2023-07-24

## 2023-07-25 DIAGNOSIS — Z76.0 MEDICATION REFILL: ICD-10-CM

## 2023-07-25 DIAGNOSIS — E03.8 OTHER SPECIFIED HYPOTHYROIDISM: ICD-10-CM

## 2023-07-26 RX ORDER — LEVOTHYROXINE SODIUM 50 UG/1
50 TABLET ORAL DAILY
Qty: 30 TABLET | Refills: 1 | Status: SHIPPED | OUTPATIENT
Start: 2023-07-26 | End: 2023-09-19 | Stop reason: SDUPTHER

## 2023-08-14 ENCOUNTER — TELEPHONE (OUTPATIENT)
Dept: PRIMARY CARE | Facility: CLINIC | Age: 73
End: 2023-08-14
Payer: COMMERCIAL

## 2023-08-15 DIAGNOSIS — M16.11 PRIMARY OSTEOARTHRITIS OF RIGHT HIP: Primary | ICD-10-CM

## 2023-08-15 RX ORDER — DULOXETIN HYDROCHLORIDE 60 MG/1
60 CAPSULE, DELAYED RELEASE ORAL DAILY
Qty: 30 CAPSULE | Refills: 2 | Status: CANCELLED | OUTPATIENT
Start: 2023-08-15

## 2023-08-15 RX ORDER — DULOXETIN HYDROCHLORIDE 60 MG/1
60 CAPSULE, DELAYED RELEASE ORAL DAILY
Qty: 30 CAPSULE | Refills: 0 | Status: SHIPPED | OUTPATIENT
Start: 2023-08-15 | End: 2023-09-19 | Stop reason: SDUPTHER

## 2023-09-16 DIAGNOSIS — K21.9 MILD ACID REFLUX: ICD-10-CM

## 2023-09-16 DIAGNOSIS — F41.9 ANXIETY: ICD-10-CM

## 2023-09-18 RX ORDER — PANTOPRAZOLE SODIUM 40 MG/1
40 TABLET, DELAYED RELEASE ORAL DAILY
Qty: 90 TABLET | Refills: 1 | Status: SHIPPED | OUTPATIENT
Start: 2023-09-18 | End: 2024-05-30

## 2023-09-18 RX ORDER — SERTRALINE HYDROCHLORIDE 100 MG/1
100 TABLET, FILM COATED ORAL DAILY
Qty: 90 TABLET | Refills: 1 | Status: SHIPPED | OUTPATIENT
Start: 2023-09-18 | End: 2024-05-30

## 2023-09-19 ENCOUNTER — OFFICE VISIT (OUTPATIENT)
Dept: PRIMARY CARE | Facility: CLINIC | Age: 73
End: 2023-09-19
Payer: COMMERCIAL

## 2023-09-19 VITALS
DIASTOLIC BLOOD PRESSURE: 82 MMHG | HEIGHT: 62 IN | RESPIRATION RATE: 16 BRPM | BODY MASS INDEX: 33.31 KG/M2 | SYSTOLIC BLOOD PRESSURE: 136 MMHG | HEART RATE: 59 BPM | WEIGHT: 181 LBS | OXYGEN SATURATION: 96 %

## 2023-09-19 DIAGNOSIS — Z00.00 HEALTH CARE MAINTENANCE: ICD-10-CM

## 2023-09-19 DIAGNOSIS — Z76.0 MEDICATION REFILL: ICD-10-CM

## 2023-09-19 DIAGNOSIS — E03.8 OTHER SPECIFIED HYPOTHYROIDISM: ICD-10-CM

## 2023-09-19 DIAGNOSIS — E78.49 OTHER HYPERLIPIDEMIA: ICD-10-CM

## 2023-09-19 DIAGNOSIS — N76.1 SUBACUTE VAGINITIS: ICD-10-CM

## 2023-09-19 DIAGNOSIS — M16.11 PRIMARY OSTEOARTHRITIS OF RIGHT HIP: ICD-10-CM

## 2023-09-19 DIAGNOSIS — R05.3 CHRONIC COUGH: Primary | ICD-10-CM

## 2023-09-19 DIAGNOSIS — Z12.31 ENCOUNTER FOR SCREENING MAMMOGRAM FOR MALIGNANT NEOPLASM OF BREAST: ICD-10-CM

## 2023-09-19 DIAGNOSIS — N18.31 TYPE 2 DIABETES MELLITUS WITH STAGE 3A CHRONIC KIDNEY DISEASE, WITHOUT LONG-TERM CURRENT USE OF INSULIN (MULTI): ICD-10-CM

## 2023-09-19 DIAGNOSIS — E11.22 TYPE 2 DIABETES MELLITUS WITH STAGE 3A CHRONIC KIDNEY DISEASE, WITHOUT LONG-TERM CURRENT USE OF INSULIN (MULTI): ICD-10-CM

## 2023-09-19 PROCEDURE — 2028F FOOT EXAM PERFORMED: CPT | Performed by: STUDENT IN AN ORGANIZED HEALTH CARE EDUCATION/TRAINING PROGRAM

## 2023-09-19 PROCEDURE — 1159F MED LIST DOCD IN RCRD: CPT | Performed by: STUDENT IN AN ORGANIZED HEALTH CARE EDUCATION/TRAINING PROGRAM

## 2023-09-19 PROCEDURE — 3051F HG A1C>EQUAL 7.0%<8.0%: CPT | Performed by: STUDENT IN AN ORGANIZED HEALTH CARE EDUCATION/TRAINING PROGRAM

## 2023-09-19 PROCEDURE — 3008F BODY MASS INDEX DOCD: CPT | Performed by: STUDENT IN AN ORGANIZED HEALTH CARE EDUCATION/TRAINING PROGRAM

## 2023-09-19 PROCEDURE — 99214 OFFICE O/P EST MOD 30 MIN: CPT | Performed by: STUDENT IN AN ORGANIZED HEALTH CARE EDUCATION/TRAINING PROGRAM

## 2023-09-19 PROCEDURE — 1036F TOBACCO NON-USER: CPT | Performed by: STUDENT IN AN ORGANIZED HEALTH CARE EDUCATION/TRAINING PROGRAM

## 2023-09-19 PROCEDURE — 3075F SYST BP GE 130 - 139MM HG: CPT | Performed by: STUDENT IN AN ORGANIZED HEALTH CARE EDUCATION/TRAINING PROGRAM

## 2023-09-19 PROCEDURE — 1126F AMNT PAIN NOTED NONE PRSNT: CPT | Performed by: STUDENT IN AN ORGANIZED HEALTH CARE EDUCATION/TRAINING PROGRAM

## 2023-09-19 PROCEDURE — 1160F RVW MEDS BY RX/DR IN RCRD: CPT | Performed by: STUDENT IN AN ORGANIZED HEALTH CARE EDUCATION/TRAINING PROGRAM

## 2023-09-19 PROCEDURE — 3079F DIAST BP 80-89 MM HG: CPT | Performed by: STUDENT IN AN ORGANIZED HEALTH CARE EDUCATION/TRAINING PROGRAM

## 2023-09-19 RX ORDER — PAROXETINE HYDROCHLORIDE 20 MG/1
TABLET, FILM COATED ORAL
COMMUNITY
Start: 2006-06-01 | End: 2024-01-19 | Stop reason: WASHOUT

## 2023-09-19 RX ORDER — LEVOTHYROXINE SODIUM 50 UG/1
50 TABLET ORAL DAILY
Qty: 30 TABLET | Refills: 1 | Status: SHIPPED | OUTPATIENT
Start: 2023-09-19 | End: 2024-05-30 | Stop reason: SDUPTHER

## 2023-09-19 RX ORDER — ROSUVASTATIN CALCIUM 10 MG/1
TABLET, COATED ORAL
COMMUNITY
Start: 2006-09-11 | End: 2023-09-19 | Stop reason: ALTCHOICE

## 2023-09-19 RX ORDER — DULOXETIN HYDROCHLORIDE 60 MG/1
60 CAPSULE, DELAYED RELEASE ORAL DAILY
Qty: 30 CAPSULE | Refills: 0 | Status: SHIPPED | OUTPATIENT
Start: 2023-09-19 | End: 2023-11-01

## 2023-09-19 RX ORDER — OMEPRAZOLE 40 MG/1
CAPSULE, DELAYED RELEASE ORAL
COMMUNITY
Start: 2006-09-11 | End: 2023-09-19 | Stop reason: WASHOUT

## 2023-09-19 RX ORDER — DEXTROMETHORPHAN HBR 15 MG/1
1 CAPSULE, LIQUID FILLED ORAL NIGHTLY PRN
Qty: 28 CAPSULE | Refills: 2 | Status: SHIPPED | OUTPATIENT
Start: 2023-09-19 | End: 2024-03-07 | Stop reason: WASHOUT

## 2023-09-19 RX ORDER — METOPROLOL SUCCINATE 100 MG/1
TABLET, EXTENDED RELEASE ORAL
COMMUNITY
Start: 2006-09-11 | End: 2023-12-08 | Stop reason: WASHOUT

## 2023-09-19 RX ORDER — FLUCONAZOLE 150 MG/1
150 TABLET ORAL ONCE
Qty: 1 TABLET | Refills: 0 | Status: SHIPPED | OUTPATIENT
Start: 2023-09-19 | End: 2023-09-19

## 2023-09-19 ASSESSMENT — ENCOUNTER SYMPTOMS
SLEEP DISTURBANCE: 0
COUGH: 1
ABDOMINAL PAIN: 0
OCCASIONAL FEELINGS OF UNSTEADINESS: 0
SINUS PAIN: 0
LOSS OF SENSATION IN FEET: 0
WOUND: 0
TROUBLE SWALLOWING: 0
FEVER: 0
CONFUSION: 0
FATIGUE: 0
NERVOUS/ANXIOUS: 0
CONSTIPATION: 0
HEADACHES: 0
WHEEZING: 0
BLOOD IN STOOL: 0
DIZZINESS: 0
NAUSEA: 0
WEAKNESS: 0
DEPRESSION: 0
HEMATURIA: 0
SHORTNESS OF BREATH: 0
POLYDIPSIA: 0
CHILLS: 0
ABDOMINAL DISTENTION: 0
EYE DISCHARGE: 0
POLYPHAGIA: 0

## 2023-09-19 ASSESSMENT — PATIENT HEALTH QUESTIONNAIRE - PHQ9
2. FEELING DOWN, DEPRESSED OR HOPELESS: NOT AT ALL
SUM OF ALL RESPONSES TO PHQ9 QUESTIONS 1 AND 2: 0
1. LITTLE INTEREST OR PLEASURE IN DOING THINGS: NOT AT ALL

## 2023-09-19 NOTE — PATIENT INSTRUCTIONS
Cough. We will obtain CXR    We will prescribe dextromethorphan. Please do not take with robitussin. Please call and let us know if it is helping your cough    Please follow up with the pharmacist for medication review    Call your heart specialist to review and update your current medication

## 2023-09-19 NOTE — PROGRESS NOTES
Subjective   Patient ID: Victorina Adamson is a 73 y.o. female who presents for Follow-up (Needs refills ).    HPI  73-year-old female with past medical history of CAD, s/p CABG, type 2 diabetes, SHARON not tolerating CPAP, heart failure with EF of 35%, End-stage heart failure with multiple hospitalizations for shortness of breath present for follow-up.  She was last seen on 06/19/2023.      Today, patient is still having cough. No change. She is off lisinopril and does not take spironolactone.     Vitals:  Reviewed and discussed    Review of Systems   Constitutional:  Negative for chills, fatigue and fever.   HENT:  Negative for congestion, ear discharge, sinus pain and trouble swallowing.    Eyes:  Negative for discharge and visual disturbance.   Respiratory:  Positive for cough. Negative for shortness of breath and wheezing.    Cardiovascular:  Negative for chest pain and leg swelling.   Gastrointestinal:  Negative for abdominal distention, abdominal pain, blood in stool, constipation and nausea.   Endocrine: Negative for polydipsia and polyphagia.   Genitourinary:  Negative for hematuria.   Musculoskeletal:  Negative for gait problem.   Skin:  Negative for wound.   Allergic/Immunologic: Negative for food allergies.   Neurological:  Negative for dizziness, weakness and headaches.   Psychiatric/Behavioral:  Negative for confusion, sleep disturbance and suicidal ideas. The patient is not nervous/anxious.        Objective     Physical Exam  Vitals and nursing note reviewed.   Constitutional:       General: She is not in acute distress.     Appearance: Normal appearance. She is obese. She is not ill-appearing.   HENT:      Head: Normocephalic and atraumatic.      Right Ear: External ear normal.      Left Ear: External ear normal.   Eyes:      Extraocular Movements: Extraocular movements intact.   Cardiovascular:      Rate and Rhythm: Normal rate and regular rhythm.   Pulmonary:      Effort: Pulmonary effort is normal.       Breath sounds: Normal breath sounds.   Abdominal:      General: Bowel sounds are normal.      Palpations: Abdomen is soft.   Musculoskeletal:         General: No swelling or tenderness. Normal range of motion.   Skin:     General: Skin is warm and dry.   Neurological:      Mental Status: She is alert.      Sensory: No sensory deficit.   Psychiatric:         Mood and Affect: Mood normal.         Thought Content: Thought content normal.       DM foot exam completed. Mild plantar callous noted. Normal sensation and no ulcers noted    Assessment/Plan     Chronic cough. Will also start a trial of dextromethorphan.   Will obtain CXR with oblique view to r/o cardiopulmonary process     Type 2 DM. A1c of 7.4%. stable . Mild polyuria. Will continue current treatment with Jardiance. Dose increased to 25 mg. Patient has noted candida vaginitis. Will prescribe diflucan. She would like to stay on jardiance so will continue     CHF/hypertension.  Did not tolerate Entresto due to cough.  Entresto switched to lisinopril.  Cough continued.  She is off lisinopril anymore. Recommend patient contact cardiology for follow up.       CAD s/p CABG. Stable. On carvedilol and tolerating. Continue current management by cardiology     Hyperlipidemia.  She is continued on atorvastatin and ezetimibe. Continue      Hypothyroidism.  Stable continue levothyroxine    Polypharmacy. Previously referred to clinical pharmacy. She will follow up for medication management and DM    Problem List Items Addressed This Visit       Diabetes mellitus (CMS/[facility])    Relevant Orders    Follow Up In [facility] - PCP - Established    Hyperlipidemia    Relevant Orders    Follow Up In [facility] - PCP - Established    Hypothyroidism    Relevant Medications    levothyroxine (Synthroid, Levoxyl) 50 mcg tablet    Other Relevant Orders    Follow Up In [facility] - PCP - Established    Primary osteoarthritis of right hip    Relevant  Medications    DULoxetine (Cymbalta) 60 mg DR capsule    Other Relevant Orders    Follow Up In [facility] - PCP - Established     Other Visit Diagnoses       Chronic cough    -  Primary    Relevant Medications    dextromethorphan HBr 15 mg capsule    Other Relevant Orders    XR chest 2 view w obliques    Follow Up In [facility] - PCP - Established    Encounter for screening mammogram for malignant neoplasm of breast        Relevant Orders    Follow Up [facility] - PCP - Established    Medication refill        Relevant Medications    empagliflozin (Jardiance) 25 mg    Other Relevant Orders    Follow Up In [facility] - PCP - Established    Subacute vaginitis        Relevant Medications    fluconazole (Diflucan) 150 mg tablet    Other Relevant Orders    Follow Up In [facility] - PCP - Established    Health care maintenance        Relevant Orders    XR DEXA bone density    Follow Up In [facility] - PCP - Established

## 2023-10-06 DIAGNOSIS — Z76.0 MEDICATION REFILL: ICD-10-CM

## 2023-10-06 RX ORDER — ISOPROPYL ALCOHOL 70 ML/100ML
SWAB TOPICAL
Qty: 100 EACH | Refills: 10 | Status: SHIPPED | OUTPATIENT
Start: 2023-10-06

## 2023-10-06 RX ORDER — CALCIUM CITRATE/VITAMIN D3 200MG-6.25
TABLET ORAL
Qty: 200 STRIP | Refills: 10 | Status: SHIPPED | OUTPATIENT
Start: 2023-10-06

## 2023-10-06 RX ORDER — LANCETS 33 GAUGE
EACH MISCELLANEOUS
Qty: 200 EACH | Refills: 10 | Status: SHIPPED | OUTPATIENT
Start: 2023-10-06

## 2023-10-06 NOTE — TELEPHONE ENCOUNTER
I see diabetic patients every three months, labs pending, needs sooner appointment than March, please get her in before the new year

## 2023-10-30 DIAGNOSIS — I42.8 OTHER CARDIOMYOPATHY (MULTI): Primary | ICD-10-CM

## 2023-10-30 DIAGNOSIS — M16.11 PRIMARY OSTEOARTHRITIS OF RIGHT HIP: ICD-10-CM

## 2023-10-30 DIAGNOSIS — I50.9 CONGESTIVE HEART FAILURE, UNSPECIFIED HF CHRONICITY, UNSPECIFIED HEART FAILURE TYPE (MULTI): ICD-10-CM

## 2023-10-30 DIAGNOSIS — I10 ESSENTIAL HYPERTENSION: ICD-10-CM

## 2023-11-01 RX ORDER — DULOXETIN HYDROCHLORIDE 60 MG/1
60 CAPSULE, DELAYED RELEASE ORAL DAILY
Qty: 30 CAPSULE | Refills: 0 | Status: SHIPPED | OUTPATIENT
Start: 2023-11-01 | End: 2024-05-30 | Stop reason: ALTCHOICE

## 2023-11-07 RX ORDER — FUROSEMIDE 20 MG/1
20 TABLET ORAL DAILY
Qty: 90 TABLET | Refills: 1 | Status: SHIPPED | OUTPATIENT
Start: 2023-11-07 | End: 2024-01-19 | Stop reason: SDUPTHER

## 2023-11-07 RX ORDER — CARVEDILOL 6.25 MG/1
6.25 TABLET ORAL
Qty: 180 TABLET | Refills: 3 | Status: SHIPPED | OUTPATIENT
Start: 2023-11-07

## 2023-11-14 ENCOUNTER — TELEPHONE (OUTPATIENT)
Dept: PHARMACY | Facility: HOSPITAL | Age: 73
End: 2023-11-14
Payer: MEDICARE

## 2023-11-14 NOTE — TELEPHONE ENCOUNTER
Population Health: Outreach by Ambulatory Pharmacy Team    Payor: Sheila FERRIS  Reason: Adherence  Medication: Jardiance (Empagliflozin) 25 mg  Outcome: No Answer/Invalid Number    Monique Robertson

## 2023-11-20 ENCOUNTER — HOSPITAL ENCOUNTER (OUTPATIENT)
Dept: RADIOLOGY | Facility: HOSPITAL | Age: 73
Discharge: HOME | End: 2023-11-20
Payer: MEDICARE

## 2023-11-20 DIAGNOSIS — M81.0 OSTEOPOROSIS: ICD-10-CM

## 2023-11-20 DIAGNOSIS — Z00.00 HEALTH CARE MAINTENANCE: ICD-10-CM

## 2023-11-20 PROCEDURE — 77085 DXA BONE DENSITY AXL VRT FX: CPT

## 2023-11-20 PROCEDURE — 77085 DXA BONE DENSITY AXL VRT FX: CPT | Performed by: RADIOLOGY

## 2023-11-21 ENCOUNTER — TELEPHONE (OUTPATIENT)
Dept: PRIMARY CARE | Facility: CLINIC | Age: 73
End: 2023-11-21
Payer: MEDICARE

## 2023-11-30 DIAGNOSIS — E11.22 TYPE 2 DIABETES MELLITUS WITH STAGE 3A CHRONIC KIDNEY DISEASE, WITHOUT LONG-TERM CURRENT USE OF INSULIN (MULTI): ICD-10-CM

## 2023-11-30 DIAGNOSIS — N18.31 TYPE 2 DIABETES MELLITUS WITH STAGE 3A CHRONIC KIDNEY DISEASE, WITHOUT LONG-TERM CURRENT USE OF INSULIN (MULTI): ICD-10-CM

## 2023-11-30 RX ORDER — METFORMIN HYDROCHLORIDE 1000 MG/1
1000 TABLET ORAL 2 TIMES DAILY
Qty: 180 TABLET | Refills: 0 | Status: SHIPPED | OUTPATIENT
Start: 2023-11-30 | End: 2024-03-21

## 2023-11-30 NOTE — TELEPHONE ENCOUNTER
Pending    Bilateral Helical Rim Advancement Flap Text: The defect edges were debeveled with a #15 blade scalpel.  Given the location of the defect and the proximity to free margins (helical rim) a bilateral helical rim advancement flap was deemed most appropriate.  Using a sterile surgical marker, the appropriate advancement flaps were drawn incorporating the defect and placing the expected incisions between the helical rim and antihelix where possible.  The area thus outlined was incised through and through with a #15 scalpel blade.  With a skin hook and iris scissors, the flaps were gently and sharply undermined and freed up.

## 2023-12-08 ENCOUNTER — LAB (OUTPATIENT)
Dept: LAB | Facility: LAB | Age: 73
End: 2023-12-08
Payer: MEDICARE

## 2023-12-08 ENCOUNTER — OFFICE VISIT (OUTPATIENT)
Dept: CARDIOLOGY | Facility: CLINIC | Age: 73
End: 2023-12-08
Payer: MEDICARE

## 2023-12-08 VITALS
DIASTOLIC BLOOD PRESSURE: 88 MMHG | HEART RATE: 74 BPM | WEIGHT: 182 LBS | SYSTOLIC BLOOD PRESSURE: 132 MMHG | HEIGHT: 62 IN | BODY MASS INDEX: 33.49 KG/M2

## 2023-12-08 DIAGNOSIS — I42.0 DILATED CARDIOMYOPATHY (MULTI): ICD-10-CM

## 2023-12-08 DIAGNOSIS — I25.10 CAD IN NATIVE ARTERY: ICD-10-CM

## 2023-12-08 DIAGNOSIS — Z76.0 MEDICATION REFILL: ICD-10-CM

## 2023-12-08 DIAGNOSIS — I42.0 DILATED CARDIOMYOPATHY (MULTI): Primary | ICD-10-CM

## 2023-12-08 DIAGNOSIS — R53.82 CHRONIC FATIGUE: ICD-10-CM

## 2023-12-08 LAB
ALBUMIN SERPL BCP-MCNC: 4.2 G/DL (ref 3.4–5)
ALP SERPL-CCNC: 81 U/L (ref 33–136)
ALT SERPL W P-5'-P-CCNC: 13 U/L (ref 7–45)
ANION GAP SERPL CALC-SCNC: 16 MMOL/L (ref 10–20)
AST SERPL W P-5'-P-CCNC: 17 U/L (ref 9–39)
BASOPHILS # BLD AUTO: 0.05 X10*3/UL (ref 0–0.1)
BASOPHILS NFR BLD AUTO: 0.5 %
BILIRUB SERPL-MCNC: 0.4 MG/DL (ref 0–1.2)
BNP SERPL-MCNC: 91 PG/ML (ref 0–99)
BUN SERPL-MCNC: 19 MG/DL (ref 6–23)
CALCIUM SERPL-MCNC: 9.6 MG/DL (ref 8.6–10.3)
CHLORIDE SERPL-SCNC: 106 MMOL/L (ref 98–107)
CO2 SERPL-SCNC: 27 MMOL/L (ref 21–32)
CREAT SERPL-MCNC: 1.05 MG/DL (ref 0.5–1.05)
EOSINOPHIL # BLD AUTO: 0.64 X10*3/UL (ref 0–0.4)
EOSINOPHIL NFR BLD AUTO: 6.7 %
ERYTHROCYTE [DISTWIDTH] IN BLOOD BY AUTOMATED COUNT: 13.9 % (ref 11.5–14.5)
GFR SERPL CREATININE-BSD FRML MDRD: 56 ML/MIN/1.73M*2
GLUCOSE SERPL-MCNC: 170 MG/DL (ref 74–99)
HCT VFR BLD AUTO: 44.4 % (ref 36–46)
HGB BLD-MCNC: 13.4 G/DL (ref 12–16)
IMM GRANULOCYTES # BLD AUTO: 0.11 X10*3/UL (ref 0–0.5)
IMM GRANULOCYTES NFR BLD AUTO: 1.1 % (ref 0–0.9)
LYMPHOCYTES # BLD AUTO: 2.29 X10*3/UL (ref 0.8–3)
LYMPHOCYTES NFR BLD AUTO: 23.9 %
MCH RBC QN AUTO: 27.2 PG (ref 26–34)
MCHC RBC AUTO-ENTMCNC: 30.2 G/DL (ref 32–36)
MCV RBC AUTO: 90 FL (ref 80–100)
MONOCYTES # BLD AUTO: 0.67 X10*3/UL (ref 0.05–0.8)
MONOCYTES NFR BLD AUTO: 7 %
NEUTROPHILS # BLD AUTO: 5.81 X10*3/UL (ref 1.6–5.5)
NEUTROPHILS NFR BLD AUTO: 60.8 %
NRBC BLD-RTO: 0 /100 WBCS (ref 0–0)
PLATELET # BLD AUTO: 261 X10*3/UL (ref 150–450)
POTASSIUM SERPL-SCNC: 5.2 MMOL/L (ref 3.5–5.3)
PROT SERPL-MCNC: 7 G/DL (ref 6.4–8.2)
RBC # BLD AUTO: 4.92 X10*6/UL (ref 4–5.2)
SODIUM SERPL-SCNC: 144 MMOL/L (ref 136–145)
TSH SERPL-ACNC: 3.07 MIU/L (ref 0.44–3.98)
WBC # BLD AUTO: 9.6 X10*3/UL (ref 4.4–11.3)

## 2023-12-08 PROCEDURE — 83880 ASSAY OF NATRIURETIC PEPTIDE: CPT

## 2023-12-08 PROCEDURE — 3051F HG A1C>EQUAL 7.0%<8.0%: CPT | Performed by: INTERNAL MEDICINE

## 2023-12-08 PROCEDURE — 85025 COMPLETE CBC W/AUTO DIFF WBC: CPT

## 2023-12-08 PROCEDURE — 84443 ASSAY THYROID STIM HORMONE: CPT

## 2023-12-08 PROCEDURE — 80053 COMPREHEN METABOLIC PANEL: CPT

## 2023-12-08 PROCEDURE — 2028F FOOT EXAM PERFORMED: CPT | Performed by: INTERNAL MEDICINE

## 2023-12-08 PROCEDURE — 1160F RVW MEDS BY RX/DR IN RCRD: CPT | Performed by: INTERNAL MEDICINE

## 2023-12-08 PROCEDURE — 3079F DIAST BP 80-89 MM HG: CPT | Performed by: INTERNAL MEDICINE

## 2023-12-08 PROCEDURE — 99213 OFFICE O/P EST LOW 20 MIN: CPT | Performed by: INTERNAL MEDICINE

## 2023-12-08 PROCEDURE — 3008F BODY MASS INDEX DOCD: CPT | Performed by: INTERNAL MEDICINE

## 2023-12-08 PROCEDURE — 1159F MED LIST DOCD IN RCRD: CPT | Performed by: INTERNAL MEDICINE

## 2023-12-08 PROCEDURE — 36415 COLL VENOUS BLD VENIPUNCTURE: CPT

## 2023-12-08 PROCEDURE — 93000 ELECTROCARDIOGRAM COMPLETE: CPT | Performed by: INTERNAL MEDICINE

## 2023-12-08 PROCEDURE — 1126F AMNT PAIN NOTED NONE PRSNT: CPT | Performed by: INTERNAL MEDICINE

## 2023-12-08 PROCEDURE — 1036F TOBACCO NON-USER: CPT | Performed by: INTERNAL MEDICINE

## 2023-12-08 PROCEDURE — 3075F SYST BP GE 130 - 139MM HG: CPT | Performed by: INTERNAL MEDICINE

## 2023-12-08 RX ORDER — BLOOD-GLUCOSE METER
EACH MISCELLANEOUS
COMMUNITY
Start: 2023-07-25

## 2023-12-08 ASSESSMENT — ENCOUNTER SYMPTOMS
DYSPNEA ON EXERTION: 1
OCCASIONAL FEELINGS OF UNSTEADINESS: 1
LOSS OF SENSATION IN FEET: 0
LIGHT-HEADEDNESS: 1
DEPRESSION: 0

## 2023-12-08 NOTE — PROGRESS NOTES
"Counseling:  The patient was counseled regarding diagnostic results, instructions for management, risk factor reductions, prognosis, patient and family education, impressions, risks and benefits of treatment options and importance of compliance with treatment.      Chief Complaint:   The patient presents today for 6-month followup of CAD, postop a-fib, heart failure and dyslipidemia.      History Of Present Illness:    Victorina Adamson is a 73 year old female patient who presents today for 6-month followup of CAD, postop a-fib, heart failure and dyslipidemia. Her PMH is significant for arthritis, CAD s/p CABG x4 (LIMA-LAD, SVG-OM1 distally and SVG sequentially-RCA and PDA) 04/21/2022, atrial fibrillation s/p DCC 05/17/2022, CHF, depression, DM, HTN, hyperlipidemia, hypothyroidism, SHARON and h/o shingles. Today, the patient reports SOB with minimal exertion and postural lightheadedness. She denies any presyncope or syncope. She also reports occasional short lasting chest pain. When going over the patient's medication list, it shows both metoprolol and carvedilol; however, the patient is not sure if she is on metoprolol and will double check when she gets home.       Last Recorded Vitals:  Vitals:    12/08/23 1058   BP: 132/88   BP Location: Right arm   Pulse: 74   Weight: 82.6 kg (182 lb)   Height: 1.575 m (5' 2\")       Past Surgical History:  She has a past surgical history that includes Other surgical history (06/17/2020); Other surgical history (06/17/2020); Other surgical history (06/17/2020); Other surgical history (05/19/2022); Other surgical history (05/26/2022); Other surgical history (06/01/2022); Other surgical history (03/15/2022); Other surgical history (02/11/2021); Other surgical history (10/18/2022); Other surgical history (09/20/2021); and IR injection joint (10/13/2021).      Social History:  She reports that she has never smoked. She has never used smokeless tobacco. She reports that she does not " currently use alcohol. She reports that she does not use drugs.    Family History:  Family History   Problem Relation Name Age of Onset    Hypertension Mother          benign essential    Arthritis Mother      Coronary artery disease Mother      Depression Mother      Diabetes Mother      Other (substance abuse) Mother      Lung cancer Father      Other (substance abuse) Father      Diabetes Sister      Hypertension Brother          benign essential    Lung cancer Brother          Allergies:  Flu vac 2022 [id number](pf), Gabapentin, Hydrocodone-acetaminophen, Influenza virus vaccines, Lisinopril, and Oxycodone    Outpatient Medications:  Current Outpatient Medications   Medication Instructions    acetaminophen (TYLENOL) 1,000 mg, oral, Every 6 hours PRN    albuterol 90 mcg/actuation inhaler 2 puffs, inhalation, Every 4 hours PRN    apixaban (Eliquis) 5 mg tablet TAKE 1 TABLET BY MOUTH TWO TIMES A DAY    apixaban (ELIQUIS) 5 mg, oral, 2 times daily    aspirin 81 mg, oral, Daily    atorvastatin (Lipitor) 80 mg tablet 1 tablet, oral, Nightly    blood glucose control, low (True Metrix Level 1) solution 1 each, Topical, See admin instructions    blood-glucose meter (True Metrix Air Glucose Meter) misc 1 each, Topical, See admin instructions    carvedilol (COREG) 6.25 mg, oral, 2 times daily with meals    dextromethorphan HBr 15 mg capsule 1 capsule, oral, Nightly PRN    diclofenac sodium (Voltaren) 1 % gel gel APPLY 4 GRAMS TOPICALLY TO AFFECTED AREA(S) EVERY 6 HOURS AS NEEDED FOR PAIN    DropSafe Alcohol Prep Pads pads, medicated USE AS DIRECTED    DULoxetine (CYMBALTA) 60 mg, oral, Daily    empagliflozin (JARDIANCE) 25 mg, oral, Daily    ezetimibe (Zetia) 10 mg tablet 1 tablet, oral, Daily    ferrous sulfate 65 mg, oral, 3 times daily with meals, Do not crush, chew, or split.    furosemide (LASIX) 20 mg, oral, Daily    levothyroxine (SYNTHROID, LEVOXYL) 50 mcg, oral, Daily    metFORMIN (GLUCOPHAGE) 1,000 mg, oral, 2  times daily    metoprolol succinate XL (Toprol XL) 100 mg 24 hr tablet oral    pantoprazole (PROTONIX) 40 mg, oral, Daily    PARoxetine (PaxiL) 20 mg tablet     potassium chloride CR 10 mEq ER tablet 10 mEq, oral, Daily, Take with food.    POTASSIUM CITRATE ORAL oral, Potassium Citrate (Elemental K) 99 MG Oral Capsule    sertraline (ZOLOFT) 100 mg, oral, Daily    spironolactone (ALDACTONE) 25 mg, oral, Daily    True Metrix Air Glucose Meter kit     True Metrix Glucose Test Strip strip USE AS DIRECTED    TRUEplus Lancets 33 gauge misc USE AS DIRECTED       Review of Systems   Cardiovascular:  Positive for chest pain and dyspnea on exertion.   Neurological:  Positive for light-headedness.   All other systems reviewed and are negative.     Physical Exam:  Constitutional:       Appearance: Healthy appearance. Not in distress.   Neck:      Vascular: No JVR. JVD normal.   Pulmonary:      Effort: Pulmonary effort is normal.      Breath sounds: Normal breath sounds. No wheezing. No rhonchi. No rales.   Chest:      Chest wall: Not tender to palpatation.   Cardiovascular:      PMI at left midclavicular line. Normal rate. Regular rhythm. Normal S1. Normal S2.       Murmurs: There is no murmur.      No gallop.  No click. No rub.   Pulses:     Intact distal pulses.   Edema:     Peripheral edema absent.   Abdominal:      General: Bowel sounds are normal.      Palpations: Abdomen is soft.      Tenderness: There is no abdominal tenderness.   Musculoskeletal: Normal range of motion.         General: No tenderness. Skin:     General: Skin is warm and dry.   Neurological:      General: No focal deficit present.      Mental Status: Alert and oriented to person, place and time.          Last Labs:  CBC -  Lab Results   Component Value Date    WBC 8.6 05/10/2023    HGB 12.9 05/10/2023    HCT 41.7 05/10/2023    MCV 84 05/10/2023     05/10/2023       CMP -  Lab Results   Component Value Date    CALCIUM 9.4 05/10/2023    PHOS 4.8  05/03/2022    PROT 6.9 05/10/2023    ALBUMIN 4.0 05/10/2023    AST 18 05/10/2023    ALT 13 05/10/2023    ALKPHOS 82 05/10/2023    BILITOT 0.4 05/10/2023       LIPID PANEL -   Lab Results   Component Value Date    CHOL 192 05/10/2023    TRIG 123 05/10/2023    HDL 68.7 05/10/2023    CHHDL 2.8 05/10/2023    LDLF 99 05/10/2023    VLDL 25 05/10/2023       RENAL FUNCTION PANEL -   Lab Results   Component Value Date    GLUCOSE 165 (H) 05/10/2023     05/10/2023    K 3.7 05/10/2023     (H) 05/10/2023    CO2 25 05/10/2023    ANIONGAP 13 05/10/2023    BUN 18 05/10/2023    CREATININE 0.79 05/10/2023    GFRMALE CANCELED 02/24/2023    CALCIUM 9.4 05/10/2023    PHOS 4.8 05/03/2022    ALBUMIN 4.0 05/10/2023        Lab Results   Component Value Date     (H) 03/16/2023    HGBA1C 7.4 (A) 05/10/2023       Last Cardiology Tests:  01/18/2023 - TTE  1. Left ventricular systolic function is moderately decreased with a 35% estimated ejection fraction.  2. Spectral Doppler shows an abnormal pattern of left ventricular diastolic filling.  3. There is moderately reduced right ventricular systolic function.  4. Mild to moderate mitral valve regurgitation.  5. Mildly elevated RVSP.  6. Moderate tricuspid regurgitation.  7. There is global hypokinesis of the left ventricle with minor regional variations.    12/01/2022 - Cardiac Catheterization (LH/RH)  1. Triple vessel disease.  2. Patent LIMA to LAD and SVG to Cx OM.  3. Occluded SVG to RCA with non-hemodynamically sig disease of RCA.  4. Elevated LV filling pressures.  5. Left Ventricular end-diastolic pressure = 24.     11/28/2022 - TTE  1. Left ventricular systolic function is moderately decreased with a 35% estimated ejection fraction.  2. Spectral Doppler shows a restrictive pattern of left ventricular diastolic filling.  3. There is severely reduced right ventricular systolic function.  4. Mild to moderate tricuspid regurgitation.  5. Mildly elevated RVSP.  6. There is  global hypokinesis of the left ventricle with minor regional variations.     11/26/2022 - CXR  Enlarged cardiac silhouette. Streaky right lower chest infiltrate or atelectasis. Possible small left pleural effusion.     08/22/2022 - TTE  Left ventricular systolic function is normal with a 60-65% estimated ejection fraction.     05/17/2022 - FREDDIE with DCC  1. The left ventricular systolic function is mildly to moderately decreased with a 45-50% estimated ejection fraction.  2. Successful direct current cardioversion for atrial fibrillation resulting in normal sinus rhythm.  3. The left atrium is mild to moderately dilated.  4. There is global hypokinesis of the left ventricle with minor regional variations.     04/25/2022 - TTE  1. The left ventricular systolic function is mildly to moderately decreased with a 40-45% estimated ejection fraction.  2. Abnormal septal motion consistent with post-operative status.  3. Spectral Doppler shows an impaired relaxation pattern of left ventricular diastolic filling.  4. Slightly elevated RVSP. The Doppler estimated RVSP is slightly elevated at 30.2 mmHg.  5. There is global hypokinesis of the left ventricle with minor regional variations.     04/21/2022 - Operative Report (Dr. JW Diop)  1. Coronary artery bypass grafting x4 with left internal mammary artery to left anterior descending artery, saphenous vein graft to obtuse marginal 1 distally and saphenous vein graft sequentially to right coronary artery and posterior descending artery.   2. Endoscopic vein harvesting.   3. Ligation of left atrial appendage with a 40 mm AtriCure clip, lot number [id number].   4. Posterior pericardiotomy.   5. Medistim flow probe analysis of bypass grafts.     04/06/2022 - Vascular Lab Carotid Artery Duplex U/S   1. Right Carotid: Findings are consistent with less than 50% stenosis of the right proximal ICA. Laminar flow seen by color Doppler. Right external carotid artery appears patent with no  evidence of stenosis. No evidence of hemodynamically significant stenosis of the right common carotid artery. The right vertebral artery is patent with antegrade flow. No evidence of hemodynamically significant stenosis in the right subclavian.  2. Left Carotid: Findings are consistent with less than 50% stenosis of the left proximal ICA. Laminar flow seen by color Doppler. Left external carotid artery appears patent with no evidence of stenosis. No evidence of hemodynamically significant stenosis of the left common carotid artery. The left vertebral artery is patent with antegrade flow. No evidence of hemodynamically significant stenosis in the left subclavian.     03/04/2022 - Cardiac MRI  1. Normal LV size (EDVi 48 ml/m2) with moderate-severely reduced systolic function (LVEF 30%).  2. Global hypokinesis with severe hypokinesis of the basal lateral wall.  3. Subendocardial infarction of the basal lateral wall (<50% wall thickness). No evidence of myocardial infiltration.  4. All myocardial segments are deemed viable.     02/21/2022 - Cardiac Catheterization (LH/RH)  1. Triple vessel coronary artery disease with proximal left anterior descending involvement.  2. The 1st diagonal branch showed severe atherosclerotic disease and diffuse atherosclerotic disease.  3. The 2nd diagonal branch demonstrated severe atherosclerotic disease and diffuse atherosclerotic disease.  4. The 1st obtuse marginal branch showed diffuse atherosclerotic disease.  5. The 2nd obtuse marginal branch demonstrated severe atherosclerotic disease.  6. The right posterior descending artery showed severe atherosclerotic disease.  7. Elevated LV filling pressures.  8. Left Ventricular end-diastolic pressure = 18.     02/19/2022 - TTE  1. The left ventricular systolic function is severely decreased with a 25-30% estimated ejection fraction.  2. Spectral Doppler shows a pseudonormal pattern of left ventricular diastolic filling.  3. There is global  hypokinesis of the left ventricle with minor regional variations.     Assessment/Plan   1) CAD s/p CABG  Cath with occluded SVG to RCA but other grafts are patent  TTE 01/2023 with LVEF 35%  Continue aspirin 81 mg daily, carvedilol 6.25 mg BID and atorvastatin 80 mg daily   Patient reports SOB with minimal exertion and postural lightheadedness  Denies presyncope or syncope  Reports occasional short lasting chest pain - most recent cath unremarkable   Both metoprolol and carvedilol are on patient's medication list  Advised to ensure she is not taking metoprolol   Check CBC, CMP, TSH  Check echo - if EF is persistently low, may need evaluation for ICD    2) Post op afib  [id number] score is 6  Continue apixaban 5 mg BID, carvedilol 6.25 mg BID   Discussed switching apixaban to warfarin s/t cost - patient declined     3) Chronic Systolic Heart Failure  TTE 01/2023 with LVEF 35%  Entresto previously discontinued s/t cost   Continue carvedilol 6.25 mg BID, Jardiance 25 mg daily, furosemide 40 mg daily  Established with [facility]  Reports SOB with minimal exertion and postural lightheadedness  Both metoprolol and carvedilol are on patient's medication list - Advised to ensure she is not taking metoprolol   Check echo - if EF is persistently low, may need evaluation for ICD     4) Dyslipidemia  Goal LDL less than 70  Continue atorvastatin 80 mg daily, ezetimibe 10 mg daily (started 06/2023)  Will avoid PCSK9 inhibitors at this time as patient is having difficulties affording medication   Lipid panel 05/10/2023 with LDL of 99; not at goal       Scribe Attestation  By signing my name below, I, Jeannine Velazquez   attest that this documentation has been prepared under the direction and in the presence of Devyn Brokos MD.

## 2023-12-08 NOTE — PATIENT INSTRUCTIONS
Continue all current medications as prescribed. When you get home, please check your medications and ensure you are not taking metoprolol. If you find that you have been taking it, please discontinue it.  Please have blood work drawn at your earliest convenience.   Dr. Brooks has ordered an echocardiogram (ultrasound of the heart) to followup on your heart function and structure.  Followup with Dr. Brooks after the above test.    If you have any questions or cardiac concerns, please call our office at [phone].

## 2023-12-08 NOTE — LETTER
"December 8, 2023     Amy Cummings DO  [address] [facility] [employer] [address], [address]  Formerly [facility], later [facility] [address]    Patient: Victorina Adamson   YOB: 1950   Date of Visit: 12/8/2023       Dear Dr. Amy Cummings DO:    Thank you for referring Victorina Adamson to me for evaluation. Below are my notes for this consultation.  If you have questions, please do not hesitate to call me. I look forward to following your patient along with you.       Sincerely,     Devyn Brooks MD      CC: No Recipients  ______________________________________________________________________________________    Counseling:  The patient was counseled regarding diagnostic results, instructions for management, risk factor reductions, prognosis, patient and family education, impressions, risks and benefits of treatment options and importance of compliance with treatment.      Chief Complaint:   The patient presents today for 6-month followup of CAD, postop a-fib, heart failure and dyslipidemia.      History Of Present Illness:    Victorina Adamson is a 73 year old female patient who presents today for 6-month followup of CAD, postop a-fib, heart failure and dyslipidemia. Her PMH is significant for arthritis, CAD s/p CABG x4 (LIMA-LAD, SVG-OM1 distally and SVG sequentially-RCA and PDA) 04/21/2022, atrial fibrillation s/p DCC 05/17/2022, CHF, depression, DM, HTN, hyperlipidemia, hypothyroidism, SHARON and h/o shingles. Today, the patient reports SOB with minimal exertion and postural lightheadedness. She denies any presyncope or syncope. She also reports occasional short lasting chest pain. When going over the patient's medication list, it shows both metoprolol and carvedilol; however, the patient is not sure if she is on metoprolol and will double check when she gets home.       Last Recorded Vitals:  Vitals:    12/08/23 1058   BP: 132/88   BP Location: Right arm   Pulse: 74   Weight: 82.6 kg (182 lb)   Height: 1.575 m (5' 2\") "       Past Surgical History:  She has a past surgical history that includes Other surgical history (06/17/2020); Other surgical history (06/17/2020); Other surgical history (06/17/2020); Other surgical history (05/19/2022); Other surgical history (05/26/2022); Other surgical history (06/01/2022); Other surgical history (03/15/2022); Other surgical history (02/11/2021); Other surgical history (10/18/2022); Other surgical history (09/20/2021); and IR injection joint (10/13/2021).      Social History:  She reports that she has never smoked. She has never used smokeless tobacco. She reports that she does not currently use alcohol. She reports that she does not use drugs.    Family History:  Family History   Problem Relation Name Age of Onset   • Hypertension Mother          benign essential   • Arthritis Mother     • Coronary artery disease Mother     • Depression Mother     • Diabetes Mother     • Other (substance abuse) Mother     • Lung cancer Father     • Other (substance abuse) Father     • Diabetes Sister     • Hypertension Brother          benign essential   • Lung cancer Brother          Allergies:  Flu vac 2022 [id number](pf), Gabapentin, Hydrocodone-acetaminophen, Influenza virus vaccines, Lisinopril, and Oxycodone    Outpatient Medications:  Current Outpatient Medications   Medication Instructions   • acetaminophen (TYLENOL) 1,000 mg, oral, Every 6 hours PRN   • albuterol 90 mcg/actuation inhaler 2 puffs, inhalation, Every 4 hours PRN   • apixaban (Eliquis) 5 mg tablet TAKE 1 TABLET BY MOUTH TWO TIMES A DAY   • apixaban (ELIQUIS) 5 mg, oral, 2 times daily   • aspirin 81 mg, oral, Daily   • atorvastatin (Lipitor) 80 mg tablet 1 tablet, oral, Nightly   • blood glucose control, low (True Metrix Level 1) solution 1 each, Topical, See admin instructions   • blood-glucose meter (True Metrix Air Glucose Meter) misc 1 each, Topical, See admin instructions   • carvedilol (COREG) 6.25 mg, oral, 2 times daily with  meals   • dextromethorphan HBr 15 mg capsule 1 capsule, oral, Nightly PRN   • diclofenac sodium (Voltaren) 1 % gel gel APPLY 4 GRAMS TOPICALLY TO AFFECTED AREA(S) EVERY 6 HOURS AS NEEDED FOR PAIN   • DropSafe Alcohol Prep Pads pads, medicated USE AS DIRECTED   • DULoxetine (CYMBALTA) 60 mg, oral, Daily   • empagliflozin (JARDIANCE) 25 mg, oral, Daily   • ezetimibe (Zetia) 10 mg tablet 1 tablet, oral, Daily   • ferrous sulfate 65 mg, oral, 3 times daily with meals, Do not crush, chew, or split.   • furosemide (LASIX) 20 mg, oral, Daily   • levothyroxine (SYNTHROID, LEVOXYL) 50 mcg, oral, Daily   • metFORMIN (GLUCOPHAGE) 1,000 mg, oral, 2 times daily   • metoprolol succinate XL (Toprol XL) 100 mg 24 hr tablet oral   • pantoprazole (PROTONIX) 40 mg, oral, Daily   • PARoxetine (PaxiL) 20 mg tablet    • potassium chloride CR 10 mEq ER tablet 10 mEq, oral, Daily, Take with food.   • POTASSIUM CITRATE ORAL oral, Potassium Citrate (Elemental K) 99 MG Oral Capsule   • sertraline (ZOLOFT) 100 mg, oral, Daily   • spironolactone (ALDACTONE) 25 mg, oral, Daily   • True Metrix Air Glucose Meter kit    • True Metrix Glucose Test Strip strip USE AS DIRECTED   • TRUEplus Lancets 33 gauge misc USE AS DIRECTED       Review of Systems   Cardiovascular:  Positive for chest pain and dyspnea on exertion.   Neurological:  Positive for light-headedness.   All other systems reviewed and are negative.     Physical Exam:  Constitutional:       Appearance: Healthy appearance. Not in distress.   Neck:      Vascular: No JVR. JVD normal.   Pulmonary:      Effort: Pulmonary effort is normal.      Breath sounds: Normal breath sounds. No wheezing. No rhonchi. No rales.   Chest:      Chest wall: Not tender to palpatation.   Cardiovascular:      PMI at left midclavicular line. Normal rate. Regular rhythm. Normal S1. Normal S2.       Murmurs: There is no murmur.      No gallop.  No click. No rub.   Pulses:     Intact distal pulses.   Edema:      Peripheral edema absent.   Abdominal:      General: Bowel sounds are normal.      Palpations: Abdomen is soft.      Tenderness: There is no abdominal tenderness.   Musculoskeletal: Normal range of motion.         General: No tenderness. Skin:     General: Skin is warm and dry.   Neurological:      General: No focal deficit present.      Mental Status: Alert and oriented to person, place and time.          Last Labs:  CBC -  Lab Results   Component Value Date    WBC 8.6 05/10/2023    HGB 12.9 05/10/2023    HCT 41.7 05/10/2023    MCV 84 05/10/2023     05/10/2023       CMP -  Lab Results   Component Value Date    CALCIUM 9.4 05/10/2023    PHOS 4.8 05/03/2022    PROT 6.9 05/10/2023    ALBUMIN 4.0 05/10/2023    AST 18 05/10/2023    ALT 13 05/10/2023    ALKPHOS 82 05/10/2023    BILITOT 0.4 05/10/2023       LIPID PANEL -   Lab Results   Component Value Date    CHOL 192 05/10/2023    TRIG 123 05/10/2023    HDL 68.7 05/10/2023    CHHDL 2.8 05/10/2023    LDLF 99 05/10/2023    VLDL 25 05/10/2023       RENAL FUNCTION PANEL -   Lab Results   Component Value Date    GLUCOSE 165 (H) 05/10/2023     05/10/2023    K 3.7 05/10/2023     (H) 05/10/2023    CO2 25 05/10/2023    ANIONGAP 13 05/10/2023    BUN 18 05/10/2023    CREATININE 0.79 05/10/2023    GFRMALE CANCELED 02/24/2023    CALCIUM 9.4 05/10/2023    PHOS 4.8 05/03/2022    ALBUMIN 4.0 05/10/2023        Lab Results   Component Value Date     (H) 03/16/2023    HGBA1C 7.4 (A) 05/10/2023       Last Cardiology Tests:  01/18/2023 - TTE  1. Left ventricular systolic function is moderately decreased with a 35% estimated ejection fraction.  2. Spectral Doppler shows an abnormal pattern of left ventricular diastolic filling.  3. There is moderately reduced right ventricular systolic function.  4. Mild to moderate mitral valve regurgitation.  5. Mildly elevated RVSP.  6. Moderate tricuspid regurgitation.  7. There is global hypokinesis of the left ventricle with  minor regional variations.    12/01/2022 - Cardiac Catheterization (LH/RH)  1. Triple vessel disease.  2. Patent LIMA to LAD and SVG to Cx OM.  3. Occluded SVG to RCA with non-hemodynamically sig disease of RCA.  4. Elevated LV filling pressures.  5. Left Ventricular end-diastolic pressure = 24.     11/28/2022 - TTE  1. Left ventricular systolic function is moderately decreased with a 35% estimated ejection fraction.  2. Spectral Doppler shows a restrictive pattern of left ventricular diastolic filling.  3. There is severely reduced right ventricular systolic function.  4. Mild to moderate tricuspid regurgitation.  5. Mildly elevated RVSP.  6. There is global hypokinesis of the left ventricle with minor regional variations.     11/26/2022 - CXR  Enlarged cardiac silhouette. Streaky right lower chest infiltrate or atelectasis. Possible small left pleural effusion.     08/22/2022 - TTE  Left ventricular systolic function is normal with a 60-65% estimated ejection fraction.     05/17/2022 - FREDDIE with DCC  1. The left ventricular systolic function is mildly to moderately decreased with a 45-50% estimated ejection fraction.  2. Successful direct current cardioversion for atrial fibrillation resulting in normal sinus rhythm.  3. The left atrium is mild to moderately dilated.  4. There is global hypokinesis of the left ventricle with minor regional variations.     04/25/2022 - TTE  1. The left ventricular systolic function is mildly to moderately decreased with a 40-45% estimated ejection fraction.  2. Abnormal septal motion consistent with post-operative status.  3. Spectral Doppler shows an impaired relaxation pattern of left ventricular diastolic filling.  4. Slightly elevated RVSP. The Doppler estimated RVSP is slightly elevated at 30.2 mmHg.  5. There is global hypokinesis of the left ventricle with minor regional variations.     04/21/2022 - Operative Report (Dr. JW Diop)  1. Coronary artery bypass grafting x4  with left internal mammary artery to left anterior descending artery, saphenous vein graft to obtuse marginal 1 distally and saphenous vein graft sequentially to right coronary artery and posterior descending artery.   2. Endoscopic vein harvesting.   3. Ligation of left atrial appendage with a 40 mm AtriCure clip, lot number [id number].   4. Posterior pericardiotomy.   5. Medistim flow probe analysis of bypass grafts.     04/06/2022 - Vascular Lab Carotid Artery Duplex U/S   1. Right Carotid: Findings are consistent with less than 50% stenosis of the right proximal ICA. Laminar flow seen by color Doppler. Right external carotid artery appears patent with no evidence of stenosis. No evidence of hemodynamically significant stenosis of the right common carotid artery. The right vertebral artery is patent with antegrade flow. No evidence of hemodynamically significant stenosis in the right subclavian.  2. Left Carotid: Findings are consistent with less than 50% stenosis of the left proximal ICA. Laminar flow seen by color Doppler. Left external carotid artery appears patent with no evidence of stenosis. No evidence of hemodynamically significant stenosis of the left common carotid artery. The left vertebral artery is patent with antegrade flow. No evidence of hemodynamically significant stenosis in the left subclavian.     03/04/2022 - Cardiac MRI  1. Normal LV size (EDVi 48 ml/m2) with moderate-severely reduced systolic function (LVEF 30%).  2. Global hypokinesis with severe hypokinesis of the basal lateral wall.  3. Subendocardial infarction of the basal lateral wall (<50% wall thickness). No evidence of myocardial infiltration.  4. All myocardial segments are deemed viable.     02/21/2022 - Cardiac Catheterization (LH/RH)  1. Triple vessel coronary artery disease with proximal left anterior descending involvement.  2. The 1st diagonal branch showed severe atherosclerotic disease and diffuse atherosclerotic disease.  3.  The 2nd diagonal branch demonstrated severe atherosclerotic disease and diffuse atherosclerotic disease.  4. The 1st obtuse marginal branch showed diffuse atherosclerotic disease.  5. The 2nd obtuse marginal branch demonstrated severe atherosclerotic disease.  6. The right posterior descending artery showed severe atherosclerotic disease.  7. Elevated LV filling pressures.  8. Left Ventricular end-diastolic pressure = 18.     02/19/2022 - TTE  1. The left ventricular systolic function is severely decreased with a 25-30% estimated ejection fraction.  2. Spectral Doppler shows a pseudonormal pattern of left ventricular diastolic filling.  3. There is global hypokinesis of the left ventricle with minor regional variations.     Assessment/Plan  1) CAD s/p CABG  Cath with occluded SVG to RCA but other grafts are patent  TTE 01/2023 with LVEF 35%  Continue aspirin 81 mg daily, carvedilol 6.25 mg BID and atorvastatin 80 mg daily   Patient reports SOB with minimal exertion and postural lightheadedness  Denies presyncope or syncope  Reports occasional short lasting chest pain - most recent cath unremarkable   Both metoprolol and carvedilol are on patient's medication list  Advised to ensure she is not taking metoprolol   Check CBC, CMP, TSH  Check echo - if EF is persistently low, may need evaluation for ICD    2) Post op afib  [id number] score is 6  Continue apixaban 5 mg BID, carvedilol 6.25 mg BID   Discussed switching apixaban to warfarin s/t cost - patient declined     3) Chronic Systolic Heart Failure  TTE 01/2023 with LVEF 35%  Entresto previously discontinued s/t cost   Continue carvedilol 6.25 mg BID, Jardiance 25 mg daily, furosemide 40 mg daily  Established with [facility]  Reports SOB with minimal exertion and postural lightheadedness  Both metoprolol and carvedilol are on patient's medication list - Advised to ensure she is not taking metoprolol   Check echo - if EF is persistently low, may need  evaluation for ICD     4) Dyslipidemia  Goal LDL less than 70  Continue atorvastatin 80 mg daily, ezetimibe 10 mg daily (started 06/2023)  Will avoid PCSK9 inhibitors at this time as patient is having difficulties affording medication   Lipid panel 05/10/2023 with LDL of 99; not at goal       Scribe Attestation  By signing my name below, I, Sofia Gibbs, Scribe   attest that this documentation has been prepared under the direction and in the presence of Devyn Brooks MD.

## 2023-12-11 ENCOUNTER — TELEPHONE (OUTPATIENT)
Dept: CARDIOLOGY | Facility: CLINIC | Age: 73
End: 2023-12-11
Payer: MEDICARE

## 2023-12-11 NOTE — TELEPHONE ENCOUNTER
----- Message from Devyn Brooks MD sent at 12/9/2023  9:04 AM EST -----  Rest of her labs looks ok

## 2023-12-11 NOTE — TELEPHONE ENCOUNTER
I called and spoke with patient and went over lab results. She asked about an appointment for echo. I don't see this is scheduled yet. I sent a message to Jeannine.

## 2023-12-21 ENCOUNTER — TELEPHONE (OUTPATIENT)
Dept: PRIMARY CARE | Facility: CLINIC | Age: 73
End: 2023-12-21
Payer: MEDICARE

## 2023-12-21 NOTE — TELEPHONE ENCOUNTER
Patient has had diarrhea,running a fever and bad cough for past 2 days. Test negative for Covid. Wants to know if something can be called into [facility].

## 2023-12-21 NOTE — TELEPHONE ENCOUNTER
We are seeing a lot of this GI bug going around the community. I would take imodium and work hard to stay hydrated. Tylenol ok to take for the temperature. If she has issues breathing, inability to tolerate PO intake require ed follow up.

## 2023-12-26 ENCOUNTER — HOSPITAL ENCOUNTER (OUTPATIENT)
Dept: CARDIOLOGY | Facility: HOSPITAL | Age: 73
Discharge: HOME | End: 2023-12-26
Payer: MEDICARE

## 2023-12-26 DIAGNOSIS — I25.10 CAD IN NATIVE ARTERY: ICD-10-CM

## 2023-12-26 DIAGNOSIS — I42.0 DILATED CARDIOMYOPATHY (MULTI): ICD-10-CM

## 2023-12-26 DIAGNOSIS — Z76.0 MEDICATION REFILL: ICD-10-CM

## 2023-12-26 DIAGNOSIS — R53.82 CHRONIC FATIGUE: ICD-10-CM

## 2023-12-26 PROCEDURE — 93306 TTE W/DOPPLER COMPLETE: CPT | Performed by: INTERNAL MEDICINE

## 2023-12-26 PROCEDURE — 93306 TTE W/DOPPLER COMPLETE: CPT

## 2023-12-27 LAB
AORTIC VALVE MEAN GRADIENT: 2.9
AORTIC VALVE PEAK VELOCITY: 1.15
AV PEAK GRADIENT: 5.3
AVA (PEAK VEL): 2.26
AVA (VTI): 2.53
EJECTION FRACTION APICAL 4 CHAMBER: 56.4
EJECTION FRACTION: 54
LEFT ATRIUM VOLUME AREA LENGTH INDEX BSA: 32.4
LEFT VENTRICLE INTERNAL DIMENSION DIASTOLE: 4.69 (ref 3.5–6)
LEFT VENTRICULAR OUTFLOW TRACT DIAMETER: 2.03
MITRAL VALVE E/A RATIO: 2.05
MITRAL VALVE E/E' RATIO: 19.65
RIGHT VENTRICLE FREE WALL PEAK S': 11.58
RIGHT VENTRICLE PEAK SYSTOLIC PRESSURE: 42.5
TRICUSPID ANNULAR PLANE SYSTOLIC EXCURSION: 2

## 2024-01-01 ENCOUNTER — APPOINTMENT (OUTPATIENT)
Dept: RADIOLOGY | Facility: HOSPITAL | Age: 74
End: 2024-01-01
Payer: MEDICARE

## 2024-01-01 ENCOUNTER — HOSPITAL ENCOUNTER (EMERGENCY)
Facility: HOSPITAL | Age: 74
Discharge: HOME | End: 2024-01-01
Attending: EMERGENCY MEDICINE
Payer: MEDICARE

## 2024-01-01 VITALS
OXYGEN SATURATION: 97 % | HEART RATE: 68 BPM | TEMPERATURE: 98.7 F | DIASTOLIC BLOOD PRESSURE: 75 MMHG | SYSTOLIC BLOOD PRESSURE: 177 MMHG | BODY MASS INDEX: 33.13 KG/M2 | WEIGHT: 180 LBS | HEIGHT: 62 IN | RESPIRATION RATE: 18 BRPM

## 2024-01-01 DIAGNOSIS — J44.1 COPD EXACERBATION (MULTI): Primary | ICD-10-CM

## 2024-01-01 LAB
ALBUMIN SERPL BCP-MCNC: 3.7 G/DL (ref 3.4–5)
ALP SERPL-CCNC: 84 U/L (ref 33–136)
ALT SERPL W P-5'-P-CCNC: 10 U/L (ref 7–45)
ANION GAP BLDV CALCULATED.4IONS-SCNC: 9 MMOL/L (ref 10–25)
ANION GAP SERPL CALC-SCNC: 12 MMOL/L (ref 10–20)
AST SERPL W P-5'-P-CCNC: 14 U/L (ref 9–39)
BASE EXCESS BLDV CALC-SCNC: 0.4 MMOL/L (ref -2–3)
BASOPHILS # BLD AUTO: 0.03 X10*3/UL (ref 0–0.1)
BASOPHILS NFR BLD AUTO: 0.3 %
BILIRUB SERPL-MCNC: 0.4 MG/DL (ref 0–1.2)
BNP SERPL-MCNC: 257 PG/ML (ref 0–99)
BODY TEMPERATURE: 37 DEGREES CELSIUS
BUN SERPL-MCNC: 17 MG/DL (ref 6–23)
CA-I BLDV-SCNC: 1.16 MMOL/L (ref 1.1–1.33)
CALCIUM SERPL-MCNC: 8.1 MG/DL (ref 8.6–10.3)
CHLORIDE BLDV-SCNC: 106 MMOL/L (ref 98–107)
CHLORIDE SERPL-SCNC: 107 MMOL/L (ref 98–107)
CO2 SERPL-SCNC: 23 MMOL/L (ref 21–32)
CREAT SERPL-MCNC: 1.14 MG/DL (ref 0.5–1.05)
EOSINOPHIL # BLD AUTO: 0.45 X10*3/UL (ref 0–0.4)
EOSINOPHIL NFR BLD AUTO: 5.1 %
ERYTHROCYTE [DISTWIDTH] IN BLOOD BY AUTOMATED COUNT: 14.7 % (ref 11.5–14.5)
FLUAV RNA RESP QL NAA+PROBE: NOT DETECTED
FLUBV RNA RESP QL NAA+PROBE: NOT DETECTED
GFR SERPL CREATININE-BSD FRML MDRD: 51 ML/MIN/1.73M*2
GLUCOSE BLDV-MCNC: 188 MG/DL (ref 74–99)
GLUCOSE SERPL-MCNC: 185 MG/DL (ref 74–99)
HCO3 BLDV-SCNC: 26 MMOL/L (ref 22–26)
HCT VFR BLD AUTO: 35 % (ref 36–46)
HCT VFR BLD EST: 37 % (ref 36–46)
HGB BLD-MCNC: 11 G/DL (ref 12–16)
HGB BLDV-MCNC: 12.2 G/DL (ref 12–16)
IMM GRANULOCYTES # BLD AUTO: 0.02 X10*3/UL (ref 0–0.5)
IMM GRANULOCYTES NFR BLD AUTO: 0.2 % (ref 0–0.9)
INHALED O2 CONCENTRATION: 21 %
LACTATE BLDV-SCNC: 1.7 MMOL/L (ref 0.4–2)
LYMPHOCYTES # BLD AUTO: 1.66 X10*3/UL (ref 0.8–3)
LYMPHOCYTES NFR BLD AUTO: 18.8 %
MCH RBC QN AUTO: 27.3 PG (ref 26–34)
MCHC RBC AUTO-ENTMCNC: 31.4 G/DL (ref 32–36)
MCV RBC AUTO: 87 FL (ref 80–100)
MONOCYTES # BLD AUTO: 0.65 X10*3/UL (ref 0.05–0.8)
MONOCYTES NFR BLD AUTO: 7.4 %
NEUTROPHILS # BLD AUTO: 6 X10*3/UL (ref 1.6–5.5)
NEUTROPHILS NFR BLD AUTO: 68.2 %
NRBC BLD-RTO: 0 /100 WBCS (ref 0–0)
OXYHGB MFR BLDV: 54.5 % (ref 45–75)
PCO2 BLDV: 45 MM HG (ref 41–51)
PH BLDV: 7.37 PH (ref 7.33–7.43)
PLATELET # BLD AUTO: 234 X10*3/UL (ref 150–450)
PO2 BLDV: 39 MM HG (ref 35–45)
POTASSIUM BLDV-SCNC: 4 MMOL/L (ref 3.5–5.3)
POTASSIUM SERPL-SCNC: 4 MMOL/L (ref 3.5–5.3)
PROT SERPL-MCNC: 6.7 G/DL (ref 6.4–8.2)
RBC # BLD AUTO: 4.03 X10*6/UL (ref 4–5.2)
RSV RNA RESP QL NAA+PROBE: NOT DETECTED
SAO2 % BLDV: 55 % (ref 45–75)
SARS-COV-2 RNA RESP QL NAA+PROBE: NOT DETECTED
SODIUM BLDV-SCNC: 137 MMOL/L (ref 136–145)
SODIUM SERPL-SCNC: 138 MMOL/L (ref 136–145)
WBC # BLD AUTO: 8.8 X10*3/UL (ref 4.4–11.3)

## 2024-01-01 PROCEDURE — 71045 X-RAY EXAM CHEST 1 VIEW: CPT | Mod: FOREIGN READ | Performed by: RADIOLOGY

## 2024-01-01 PROCEDURE — 2500000002 HC RX 250 W HCPCS SELF ADMINISTERED DRUGS (ALT 637 FOR MEDICARE OP, ALT 636 FOR OP/ED): Performed by: EMERGENCY MEDICINE

## 2024-01-01 PROCEDURE — 84132 ASSAY OF SERUM POTASSIUM: CPT | Performed by: EMERGENCY MEDICINE

## 2024-01-01 PROCEDURE — 2500000004 HC RX 250 GENERAL PHARMACY W/ HCPCS (ALT 636 FOR OP/ED): Performed by: EMERGENCY MEDICINE

## 2024-01-01 PROCEDURE — 87637 SARSCOV2&INF A&B&RSV AMP PRB: CPT | Performed by: EMERGENCY MEDICINE

## 2024-01-01 PROCEDURE — 99284 EMERGENCY DEPT VISIT MOD MDM: CPT | Performed by: EMERGENCY MEDICINE

## 2024-01-01 PROCEDURE — 83880 ASSAY OF NATRIURETIC PEPTIDE: CPT | Performed by: EMERGENCY MEDICINE

## 2024-01-01 PROCEDURE — 85025 COMPLETE CBC W/AUTO DIFF WBC: CPT | Performed by: EMERGENCY MEDICINE

## 2024-01-01 PROCEDURE — 71045 X-RAY EXAM CHEST 1 VIEW: CPT

## 2024-01-01 PROCEDURE — 99283 EMERGENCY DEPT VISIT LOW MDM: CPT | Mod: 25

## 2024-01-01 PROCEDURE — 36415 COLL VENOUS BLD VENIPUNCTURE: CPT | Performed by: EMERGENCY MEDICINE

## 2024-01-01 RX ORDER — ALBUTEROL SULFATE 90 UG/1
2 AEROSOL, METERED RESPIRATORY (INHALATION) EVERY 4 HOURS PRN
Qty: 18 G | Refills: 11 | Status: SHIPPED | OUTPATIENT
Start: 2024-01-01

## 2024-01-01 RX ORDER — BENZONATATE 100 MG/1
100 CAPSULE ORAL EVERY 8 HOURS
Qty: 21 CAPSULE | Refills: 0 | Status: SHIPPED | OUTPATIENT
Start: 2024-01-01 | End: 2024-01-08

## 2024-01-01 RX ORDER — BENZONATATE 100 MG/1
100 CAPSULE ORAL 3 TIMES DAILY PRN
Status: DISCONTINUED | OUTPATIENT
Start: 2024-01-01 | End: 2024-01-02 | Stop reason: HOSPADM

## 2024-01-01 RX ORDER — IPRATROPIUM BROMIDE AND ALBUTEROL SULFATE 2.5; .5 MG/3ML; MG/3ML
6 SOLUTION RESPIRATORY (INHALATION)
Status: DISCONTINUED | OUTPATIENT
Start: 2024-01-01 | End: 2024-01-02 | Stop reason: HOSPADM

## 2024-01-01 RX ORDER — PREDNISONE 20 MG/1
60 TABLET ORAL DAILY
Qty: 12 TABLET | Refills: 0 | Status: SHIPPED | OUTPATIENT
Start: 2024-01-01 | End: 2024-01-05

## 2024-01-01 RX ADMIN — PREDNISONE 60 MG: 10 TABLET ORAL at 17:22

## 2024-01-01 RX ADMIN — IPRATROPIUM BROMIDE AND ALBUTEROL SULFATE 6 ML: 2.5; .5 SOLUTION RESPIRATORY (INHALATION) at 20:05

## 2024-01-01 ASSESSMENT — PAIN DESCRIPTION - LOCATION: LOCATION: RIB CAGE

## 2024-01-01 ASSESSMENT — LIFESTYLE VARIABLES
EVER HAD A DRINK FIRST THING IN THE MORNING TO STEADY YOUR NERVES TO GET RID OF A HANGOVER: NO
HAVE YOU EVER FELT YOU SHOULD CUT DOWN ON YOUR DRINKING: NO
REASON UNABLE TO ASSESS: NO
EVER FELT BAD OR GUILTY ABOUT YOUR DRINKING: NO
HAVE PEOPLE ANNOYED YOU BY CRITICIZING YOUR DRINKING: NO

## 2024-01-01 ASSESSMENT — COLUMBIA-SUICIDE SEVERITY RATING SCALE - C-SSRS
5. HAVE YOU STARTED TO WORK OUT OR WORKED OUT THE DETAILS OF HOW TO KILL YOURSELF? DO YOU INTEND TO CARRY OUT THIS PLAN?: NO
4. HAVE YOU HAD THESE THOUGHTS AND HAD SOME INTENTION OF ACTING ON THEM?: NO
1. IN THE PAST MONTH, HAVE YOU WISHED YOU WERE DEAD OR WISHED YOU COULD GO TO SLEEP AND NOT WAKE UP?: NO
6. HAVE YOU EVER DONE ANYTHING, STARTED TO DO ANYTHING, OR PREPARED TO DO ANYTHING TO END YOUR LIFE?: NO
2. HAVE YOU ACTUALLY HAD ANY THOUGHTS OF KILLING YOURSELF?: YES

## 2024-01-01 ASSESSMENT — PAIN SCALES - GENERAL: PAINLEVEL_OUTOF10: 5 - MODERATE PAIN

## 2024-01-01 ASSESSMENT — PAIN - FUNCTIONAL ASSESSMENT: PAIN_FUNCTIONAL_ASSESSMENT: 0-10

## 2024-01-01 NOTE — ED PROVIDER NOTES
"HPI   Chief Complaint   Patient presents with    Cough     Cough since open heart in 2020. Has muscle pain in ribs from coughing. Said she \"doesn't have the will to live anymore\". When asked about SI, she denied but said \"zero desire to live\".     Shortness of Breath       Patient presents to the emergency department for cough, weakness and some lightheadedness.  Patient states the cough is going on since 2020, 3 years now.  Patient reports that it is from one of her medications according to other physicians.  She seems to joked that the cough bothers her so much she would rather just die.  Patient denies any suicidal thoughts.  Patient does talk to her physician and they have tried to change up medications to help decrease the cough.  Patient denies any sick contacts.                          Thony Coma Scale Score: 15                  Patient History   Past Medical History:   Diagnosis Date    Acute upper respiratory infection, unspecified 02/11/2022    URI, acute    Anxiety disorder, unspecified     Anxiety and depression    Chronic tension-type headache, not intractable     Chronic tension headaches    Diverticulosis of intestine, part unspecified, without perforation or abscess without bleeding     Diverticulosis    Encounter for general adult medical examination without abnormal findings 09/20/2021    Medicare annual wellness visit, subsequent    Encounter for other screening for malignant neoplasm of breast 02/11/2021    Breast cancer screening    Encounter for preprocedural cardiovascular examination     Pre-operative cardiovascular examination    Encounter for screening for malignant neoplasm of colon 02/11/2021    Colon cancer screening    Lumbago with sciatica, right side 09/20/2021    Acute right-sided low back pain with right-sided sciatica    Major depressive disorder, single episode, mild (CMS/HCC) 09/20/2021    Depression, major, single episode, mild    Obesity, unspecified 06/22/2020    Obesity " (BMI 30-39.9)    Other intervertebral disc degeneration, lumbar region 03/15/2022    Degeneration of intervertebral disc of lumbar region    Other specified health status 06/22/2020    Intolerance of continuous positive airway pressure (CPAP) ventilation    Other unilateral secondary osteoarthritis of hip     Other secondary osteoarthritis of right hip    Pain in right hip 10/02/2021    Hip pain, right    Personal history of other diseases of the digestive system     History of gastroesophageal reflux (GERD)    Personal history of other diseases of the digestive system     History of diverticulitis    Personal history of other diseases of the musculoskeletal system and connective tissue 06/08/2021    History of arthritis    Personal history of other diseases of the nervous system and sense organs 03/28/2022    History of acute otitis media    Personal history of other infectious and parasitic diseases 06/08/2021    History of candidiasis of vagina    Personal history of other infectious and parasitic diseases 06/18/2020    History of herpes zoster    Personal history of other mental and behavioral disorders 03/15/2022    History of depression    Personal history of other specified conditions     History of vertigo    Personal history of other specified conditions 02/11/2021    History of dysphagia    Personal history of other specified conditions 06/22/2020    History of insomnia    Spinal stenosis, lumbosacral region 11/23/2021    Spinal stenosis of lumbosacral region    Spondylosis without myelopathy or radiculopathy, lumbar region 11/30/2021    Lumbar spondylosis    Unspecified fall, subsequent encounter 09/20/2021    Fall at home, subsequent encounter     Past Surgical History:   Procedure Laterality Date    IR INJECTION JOINT  10/13/2021    IR INJECTION JOINT 10/13/2021 P[facility] AIB LEGACY    OTHER SURGICAL HISTORY  06/17/2020    Cholecystectomy    OTHER SURGICAL HISTORY  06/17/2020    Tubal ligation    OTHER  SURGICAL HISTORY  06/17/2020    Tonsillectomy    OTHER SURGICAL HISTORY  05/19/2022    Coronary artery bypass graft    OTHER SURGICAL HISTORY  05/26/2022    Heart surgery    OTHER SURGICAL HISTORY  06/01/2022    Coronary artery bypass graft    OTHER SURGICAL HISTORY  03/15/2022    Cardiac catheterization    OTHER SURGICAL HISTORY  02/11/2021    Cataract surgery    OTHER SURGICAL HISTORY  10/18/2022    Hip replacement    OTHER SURGICAL HISTORY  09/20/2021    Carpal tunnel surgery     Family History   Problem Relation Name Age of Onset    Hypertension Mother          benign essential    Arthritis Mother      Coronary artery disease Mother      Depression Mother      Diabetes Mother      Other (substance abuse) Mother      Lung cancer Father      Other (substance abuse) Father      Diabetes Sister      Hypertension Brother          benign essential    Lung cancer Brother       Social History     Tobacco Use    Smoking status: Never    Smokeless tobacco: Never   Vaping Use    Vaping Use: Never used   Substance Use Topics    Alcohol use: Not Currently     Comment: goes out once a month    Drug use: Never       Physical Exam   ED Triage Vitals [01/01/24 1635]   Temp Heart Rate Resp BP   37.1 °C (98.7 °F) 76 18 163/68      SpO2 Temp Source Heart Rate Source Patient Position   98 % Temporal Monitor Sitting      BP Location FiO2 (%)     Left arm --       Physical Exam    ED Course & MDM   Diagnoses as of 01/01/24 2039   COPD exacerbation (CMS/[facility])       Medical Decision Making  Patient presents with cough and weakness. Available chart reviewed. On initial assessment the patient was found non-toxic, no acute distress, vitals hemodynamically stable and afebrile. Initial concern for COPD exacerbation, pneumonia, pneumothorax, viral infection, heart failure.  Venous blood gas is unremarkable.  Metabolic panel shows slight elevation of creatinine 1.14 and hypocalcemia of 8.1.  BNP slightly elevated at 257 which has improved from  1810 months ago.  CBC shows no leukocytosis, anemia hemoglobin 11, no thrombocytopenia.  Flu and COVID and RSV are not detected.  Patient feels better after steroids, DuLuis Eb's here in the emergency department and Tessalon Perles.  Will discharge home with course of prednisone, refill her albuterol inhaler and instructed for every 4 dosing of that while on steroids as well as Tessalon Perles.  Patient follow-up with her primary care provider.    No indication for admission.  Discussed findings and diagnosis with the patient, follow-up and return to ED precautions given, patient voiced understanding, agrees with plan, questions answered, patient was discharged in stable condition.        Procedure  Procedures     Sean Romero MD  01/01/24 2051

## 2024-01-02 ENCOUNTER — APPOINTMENT (OUTPATIENT)
Dept: CARDIOLOGY | Facility: HOSPITAL | Age: 74
End: 2024-01-02
Payer: MEDICARE

## 2024-01-02 PROCEDURE — 93005 ELECTROCARDIOGRAM TRACING: CPT

## 2024-01-07 LAB
ATRIAL RATE: 80 BPM
P AXIS: 42 DEGREES
PR INTERVAL: 167 MS
Q ONSET: 249 MS
QRS COUNT: 13 BEATS
QRS DURATION: 98 MS
QT INTERVAL: 442 MS
QTC CALCULATION(BAZETT): 510 MS
QTC FREDERICIA: 486 MS
R AXIS: 16 DEGREES
T AXIS: 146 DEGREES
T OFFSET: 470 MS
VENTRICULAR RATE: 80 BPM

## 2024-01-10 PROBLEM — G44.229 CHRONIC TENSION HEADACHE: Status: ACTIVE | Noted: 2024-01-10

## 2024-01-10 PROBLEM — B37.31 CANDIDIASIS OF VAGINA: Status: ACTIVE | Noted: 2024-01-10

## 2024-01-10 PROBLEM — I50.9 CONGESTIVE HEART FAILURE (MULTI): Status: ACTIVE | Noted: 2022-02-19

## 2024-01-10 PROBLEM — I25.810 ATHEROSCLEROSIS OF AUTOLOGOUS VEIN CORONARY ARTERY BYPASS GRAFT: Status: ACTIVE | Noted: 2022-12-02

## 2024-01-10 PROBLEM — R05.8 OTHER SPECIFIED COUGH: Status: ACTIVE | Noted: 2023-01-30

## 2024-01-10 PROBLEM — M81.0 OSTEOPOROSIS: Status: ACTIVE | Noted: 2024-01-10

## 2024-01-10 PROBLEM — Z20.822 CONTACT WITH AND (SUSPECTED) EXPOSURE TO COVID-19: Status: ACTIVE | Noted: 2023-02-23

## 2024-01-10 PROBLEM — I25.10 ARTERIOSCLEROSIS OF CORONARY ARTERY: Status: ACTIVE | Noted: 2022-04-21

## 2024-01-10 PROBLEM — J18.9 PNEUMONIA: Status: ACTIVE | Noted: 2022-11-26

## 2024-01-10 PROBLEM — I50.22 CHRONIC SYSTOLIC HEART FAILURE (MULTI): Status: ACTIVE | Noted: 2024-01-10

## 2024-01-10 PROBLEM — Z28.310 UNVACCINATED FOR COVID-19: Status: ACTIVE | Noted: 2022-12-02

## 2024-01-10 PROBLEM — J44.1 ACUTE EXACERBATION OF CHRONIC OBSTRUCTIVE PULMONARY DISEASE (MULTI): Status: ACTIVE | Noted: 2022-12-02

## 2024-01-10 PROBLEM — D50.9 IRON DEFICIENCY ANEMIA: Status: ACTIVE | Noted: 2023-02-23

## 2024-01-10 PROBLEM — D64.9 ANEMIA: Status: ACTIVE | Noted: 2022-12-02

## 2024-01-10 PROBLEM — K21.9 MILD ACID REFLUX: Status: ACTIVE | Noted: 2023-02-23

## 2024-01-10 PROBLEM — F41.8 MIXED ANXIETY DEPRESSIVE DISORDER: Status: ACTIVE | Noted: 2023-04-05

## 2024-01-10 PROBLEM — K57.90 DIVERTICULAR DISEASE: Status: ACTIVE | Noted: 2024-01-10

## 2024-01-10 PROBLEM — E16.2 HYPOGLYCEMIA: Status: ACTIVE | Noted: 2024-01-10

## 2024-01-10 PROBLEM — B02.9 HERPES ZOSTER: Status: ACTIVE | Noted: 2024-01-10

## 2024-01-10 PROBLEM — E78.2 MIXED HYPERLIPIDEMIA: Status: ACTIVE | Noted: 2023-02-28

## 2024-01-10 PROBLEM — E87.6 HYPOKALEMIA: Status: ACTIVE | Noted: 2023-02-23

## 2024-01-10 PROBLEM — I48.91 ATRIAL FIBRILLATION (MULTI): Status: ACTIVE | Noted: 2022-12-02

## 2024-01-10 PROBLEM — R13.10 DYSPHAGIA: Status: ACTIVE | Noted: 2024-01-10

## 2024-01-10 PROBLEM — Z28.39 OTHER UNDERIMMUNIZATION STATUS: Status: ACTIVE | Noted: 2022-12-02

## 2024-01-10 PROBLEM — E11.9 TYPE 2 DIABETES MELLITUS WITHOUT COMPLICATION (MULTI): Status: ACTIVE | Noted: 2022-12-02

## 2024-01-10 PROBLEM — G47.30 SLEEP-DISORDERED BREATHING: Status: ACTIVE | Noted: 2024-01-10

## 2024-01-10 PROBLEM — R07.9 CHEST PAIN: Status: ACTIVE | Noted: 2022-11-28

## 2024-01-10 PROBLEM — Z59.41 FOOD INSECURITY: Status: ACTIVE | Noted: 2023-03-10

## 2024-01-10 PROBLEM — R53.83 FATIGUE: Status: ACTIVE | Noted: 2023-12-08

## 2024-01-10 PROBLEM — G56.00 CARPAL TUNNEL SYNDROME: Status: ACTIVE | Noted: 2024-01-10

## 2024-01-10 PROBLEM — R06.02 SHORTNESS OF BREATH: Status: ACTIVE | Noted: 2022-02-18

## 2024-01-10 PROBLEM — N76.1 SUBACUTE VAGINITIS: Status: ACTIVE | Noted: 2024-01-10

## 2024-01-10 PROBLEM — G47.00 INSOMNIA: Status: ACTIVE | Noted: 2024-01-10

## 2024-01-10 PROBLEM — G89.18 ACUTE POSTOPERATIVE PAIN: Status: ACTIVE | Noted: 2024-01-10

## 2024-01-10 PROBLEM — Z96.649 HISTORY OF TOTAL HIP REPLACEMENT: Status: ACTIVE | Noted: 2023-02-28

## 2024-01-10 RX ORDER — MECLIZINE HCL 12.5 MG 12.5 MG/1
TABLET ORAL
COMMUNITY
Start: 2022-10-18 | End: 2024-01-19 | Stop reason: WASHOUT

## 2024-01-10 RX ORDER — LOSARTAN POTASSIUM 100 MG/1
TABLET ORAL
COMMUNITY
Start: 2021-06-08 | End: 2024-01-19 | Stop reason: WASHOUT

## 2024-01-10 RX ORDER — GLIMEPIRIDE 2 MG/1
TABLET ORAL EVERY 12 HOURS
COMMUNITY
Start: 2021-06-08 | End: 2024-01-19 | Stop reason: WASHOUT

## 2024-01-10 RX ORDER — HYDROCODONE BITARTRATE AND ACETAMINOPHEN 5; 325 MG/1; MG/1
TABLET ORAL EVERY 6 HOURS
COMMUNITY
Start: 2022-08-01 | End: 2024-01-19 | Stop reason: WASHOUT

## 2024-01-10 RX ORDER — FLUTICASONE PROPIONATE 50 MCG
SPRAY, SUSPENSION (ML) NASAL
COMMUNITY
Start: 2018-12-13 | End: 2024-01-19 | Stop reason: WASHOUT

## 2024-01-10 RX ORDER — MUPIROCIN 20 MG/G
OINTMENT TOPICAL EVERY 12 HOURS
COMMUNITY
Start: 2022-04-01 | End: 2024-01-19 | Stop reason: WASHOUT

## 2024-01-10 RX ORDER — CALCIUM CARBONATE 300MG(750)
TABLET,CHEWABLE ORAL
COMMUNITY
Start: 2022-12-21 | End: 2024-01-19 | Stop reason: WASHOUT

## 2024-01-10 RX ORDER — NYSTATIN 100000 U/G
CREAM TOPICAL
COMMUNITY
Start: 2022-04-26 | End: 2024-01-19 | Stop reason: WASHOUT

## 2024-01-10 RX ORDER — CHLORHEXIDINE GLUCONATE 40 MG/ML
SOLUTION TOPICAL
COMMUNITY
Start: 2022-04-01 | End: 2024-01-19 | Stop reason: WASHOUT

## 2024-01-10 RX ORDER — TAMSULOSIN HYDROCHLORIDE 0.4 MG/1
CAPSULE ORAL
COMMUNITY
Start: 2022-04-26 | End: 2024-01-19 | Stop reason: WASHOUT

## 2024-01-10 RX ORDER — OXYCODONE AND ACETAMINOPHEN 5; 325 MG/1; MG/1
TABLET ORAL
COMMUNITY
Start: 2023-01-12 | End: 2024-01-19 | Stop reason: WASHOUT

## 2024-01-10 RX ORDER — IBUPROFEN 800 MG/1
TABLET ORAL
COMMUNITY
Start: 2021-06-08 | End: 2024-01-19 | Stop reason: WASHOUT

## 2024-01-10 RX ORDER — LISINOPRIL 5 MG/1
5 TABLET ORAL DAILY
COMMUNITY
Start: 2023-09-25 | End: 2024-01-19 | Stop reason: WASHOUT

## 2024-01-18 PROBLEM — E78.5 DYSLIPIDEMIA: Status: ACTIVE | Noted: 2024-01-18

## 2024-01-18 ASSESSMENT — ENCOUNTER SYMPTOMS
HEMATURIA: 0
WHEEZING: 0
NEAR-SYNCOPE: 0
ORTHOPNEA: 0
IRREGULAR HEARTBEAT: 0
SHORTNESS OF BREATH: 0
VOMITING: 0
NAUSEA: 0
FEVER: 0
COUGH: 0
ALTERED MENTAL STATUS: 0
SYNCOPE: 0
PALPITATIONS: 0
CHILLS: 0
HEMATOCHEZIA: 0

## 2024-01-18 NOTE — PATIENT INSTRUCTIONS
Recommend Mediterranean style of eating  Stop lisinopril as this may be causing your cough  Start losartan 25 mg daily - start this on the first day you do not take lisinopril   Decrease furosemide 10 mg daily (0.5 tablet)  Check fasting lab work  Refer to GI regarding anemia and diarrhea  Follow-up with Dr. Brooks in 3 months  If you have any questions or cardiac concerns, please call our office at [phone].

## 2024-01-18 NOTE — PROGRESS NOTES
Chief Complaint/Reason for Visit:  Follow-up 1 month cardiovascular follow up    History Of Present Illness:    Victorina Adamson is a 73 y.o. female that presents to the office for 1 month follow up.    Taking medications as prescribed.     PMH is significant for arthritis, CAD s/p CABG x4 (LIMA-LAD, SVG-OM1 distally and SVG sequentially-RCA and PDA) 04/21/2022, atrial fibrillation s/p DCC 05/17/2022, CHF, depression, DM, HTN, hyperlipidemia, hypothyroidism, SHARON and shingles.     She reports that she took care of her 13 year old (who has autism) and 8 year old grandson all summer.     During last office visit with Dr. Devyn Brooks, it was recommended that if she was taking metoprolol it should be discontinued and echocardiogram was ordered.     Past Medical History:  She has a past medical history of Acute upper respiratory infection, unspecified (02/11/2022), Anxiety disorder, unspecified, Chronic tension-type headache, not intractable, Diverticulosis of intestine, part unspecified, without perforation or abscess without bleeding, Encounter for general adult medical examination without abnormal findings (09/20/2021), Encounter for other screening for malignant neoplasm of breast (02/11/2021), Encounter for preprocedural cardiovascular examination, Encounter for screening for malignant neoplasm of colon (02/11/2021), Lumbago with sciatica, right side (09/20/2021), Major depressive disorder, single episode, mild (CMS/HCC) (09/20/2021), Obesity, unspecified (06/22/2020), Other intervertebral disc degeneration, lumbar region (03/15/2022), Other specified health status (06/22/2020), Other unilateral secondary osteoarthritis of hip, Pain in right hip (10/02/2021), Personal history of other diseases of the digestive system, Personal history of other diseases of the digestive system, Personal history of other diseases of the musculoskeletal system and connective tissue (06/08/2021), Personal history of other diseases of  the nervous system and sense organs (03/28/2022), Personal history of other infectious and parasitic diseases (06/08/2021), Personal history of other infectious and parasitic diseases (06/18/2020), Personal history of other mental and behavioral disorders (03/15/2022), Personal history of other specified conditions, Personal history of other specified conditions (02/11/2021), Personal history of other specified conditions (06/22/2020), Spinal stenosis, lumbosacral region (11/23/2021), Spondylosis without myelopathy or radiculopathy, lumbar region (11/30/2021), and Unspecified fall, subsequent encounter (09/20/2021).    Past Surgical History:  She has a past surgical history that includes Other surgical history (06/17/2020); Other surgical history (06/17/2020); Other surgical history (06/17/2020); Other surgical history (05/19/2022); Other surgical history (05/26/2022); Other surgical history (06/01/2022); Other surgical history (03/15/2022); Other surgical history (02/11/2021); Other surgical history (10/18/2022); Other surgical history (09/20/2021); and IR injection joint (10/13/2021).      Social History:  She reports that she has never smoked. She has never used smokeless tobacco. She reports that she does not currently use alcohol. She reports that she does not use drugs.    Family History:  Family History   Problem Relation Name Age of Onset    Hypertension Mother          benign essential    Arthritis Mother      Coronary artery disease Mother      Depression Mother      Diabetes Mother      Other (substance abuse) Mother      Lung cancer Father      Other (substance abuse) Father      Diabetes Sister      Hypertension Brother          benign essential    Lung cancer Brother          Allergies:  Gabapentin, Hydrocodone-acetaminophen, Influenza virus vaccines, Lisinopril, and Oxycodone    Review of Systems   Constitutional: Positive for malaise/fatigue. Negative for chills and fever.   Cardiovascular:  Positive  for dyspnea on exertion. Negative for chest pain, irregular heartbeat, leg swelling, near-syncope, orthopnea, palpitations and syncope.   Respiratory:  Negative for cough, shortness of breath and wheezing.    Musculoskeletal:  Positive for joint pain (hip pain).   Gastrointestinal:  Positive for diarrhea. Negative for hematochezia, melena, nausea and vomiting.   Genitourinary:  Negative for hematuria.   Neurological:  Positive for dizziness.   Psychiatric/Behavioral:  Negative for altered mental status.        Objective      Vitals reviewed.   Constitutional:       Appearance: Not in distress.   Neck:      Vascular: No carotid bruit.   Pulmonary:      Effort: Pulmonary effort is normal.      Breath sounds: Normal breath sounds.   Cardiovascular:      PMI at left midclavicular line. Normal rate. Regular rhythm. S1 with normal intensity. S2 with normal intensity.       Murmurs: There is no murmur.   Edema:     Peripheral edema absent.   Abdominal:      General: Bowel sounds are normal.   Neurological:      Mental Status: Alert and oriented to person, place and time.         Current Outpatient Medications   Medication Instructions    acetaminophen (TYLENOL) 1,000 mg, oral, Every 6 hours PRN    albuterol 90 mcg/actuation inhaler 2 puffs, inhalation, Every 4 hours PRN    apixaban (Eliquis) 5 mg tablet TAKE 1 TABLET BY MOUTH TWO TIMES A DAY    aspirin 81 mg, oral, Daily    atorvastatin (Lipitor) 80 mg tablet 1 tablet, oral, Nightly    blood glucose control, low (True Metrix Level 1) solution 1 each, Topical, See admin instructions    blood-glucose meter (True Metrix Air Glucose Meter) misc 1 each, Topical, See admin instructions    carvedilol (COREG) 6.25 mg, oral, 2 times daily with meals    chlorhexidine (Hibiclens) 4 % external liquid Hibiclens 4 % External Liquid USE AS DIRECTED. Quantity: 1 Refills: 0 Ordered: 1-Apr-2022 María Elena Sales Start : 1-Apr-2022 Active    dextromethorphan HBr 15 mg capsule 1 capsule,  oral, Nightly PRN    diclofenac sodium (Voltaren) 1 % gel gel APPLY 4 GRAMS TOPICALLY TO AFFECTED AREA(S) EVERY 6 HOURS AS NEEDED FOR PAIN    DropSafe Alcohol Prep Pads pads, medicated USE AS DIRECTED    DULoxetine (CYMBALTA) 60 mg, oral, Daily    empagliflozin (JARDIANCE) 25 mg, oral, Daily    ezetimibe (Zetia) 10 mg tablet 1 tablet, oral, Daily    ferrous sulfate 65 mg, oral, 3 times daily with meals, Do not crush, chew, or split.    fluticasone (Flonase) 50 mcg/actuation nasal spray nasal    furosemide (LASIX) 20 mg, oral, Daily    glimepiride (Amaryl) 2 mg tablet oral, Every 12 hours    HYDROcodone-acetaminophen (Norco) 5-325 mg tablet oral, Every 6 hours    ibuprofen 800 mg tablet oral    levothyroxine (SYNTHROID, LEVOXYL) 50 mcg, oral, Daily    lisinopril 5 mg, oral, Daily    losartan (Cozaar) 100 mg tablet oral, Daily RT    magnesium oxide (Mag-Ox) 400 mg tablet oral    meclizine (Antivert) 12.5 mg tablet oral    metFORMIN (GLUCOPHAGE) 1,000 mg, oral, 2 times daily    mupirocin (Bactroban) 2 % ointment Every 12 hours    nystatin (Mycostatin) cream Nystatin 743305 UNIT/GM External Cream APPLY SPARINGLY TO AFFECTED AREA(S) 3 TIMES A DAY Quantity: 0 Refills: 0 Ordered: 19-May-2022 DO Start : 19-May-2022 Active    oxyCODONE-acetaminophen (Percocet) 5-325 mg tablet oral    pantoprazole (PROTONIX) 40 mg, oral, Daily    PARoxetine (PaxiL) 20 mg tablet     potassium chloride CR 10 mEq ER tablet 10 mEq, oral, Daily, Take with food.    semaglutide (Ozempic) 0.25 mg or 0.5 mg(2 mg/1.5 mL) pen injector subcutaneous, Weekly    sertraline (ZOLOFT) 100 mg, oral, Daily    tamsulosin (Flomax) 0.4 mg 24 hr capsule oral    True Metrix Air Glucose Meter kit     True Metrix Glucose Test Strip strip USE AS DIRECTED    TRUEplus Lancets 33 gauge misc USE AS DIRECTED        Last Labs:  CBC -  Lab Results   Component Value Date    WBC 8.8 01/01/2024    HGB 11.0 (L) 01/01/2024    HCT 35.0 (L) 01/01/2024    MCV 87 01/01/2024      "01/01/2024       RENAL FUNCTION PANEL -   Lab Results   Component Value Date    GLUCOSE 185 (H) 01/01/2024     01/01/2024    K 4.0 01/01/2024     01/01/2024    CO2 23 01/01/2024    ANIONGAP 12 01/01/2024    BUN 17 01/01/2024    CREATININE 1.14 (H) 01/01/2024    GFRMALE CANCELED 02/24/2023    CALCIUM 8.1 (L) 01/01/2024    PHOS 4.8 05/03/2022    ALBUMIN 3.7 01/01/2024        CMP -  Lab Results   Component Value Date    CALCIUM 8.1 (L) 01/01/2024    PHOS 4.8 05/03/2022    PROT 6.7 01/01/2024    ALBUMIN 3.7 01/01/2024    AST 14 01/01/2024    ALT 10 01/01/2024    ALKPHOS 84 01/01/2024    BILITOT 0.4 01/01/2024       LIPID PANEL -   Lab Results   Component Value Date    CHOL 192 05/10/2023    TRIG 123 05/10/2023    HDL 68.7 05/10/2023    CHHDL 2.8 05/10/2023    LDLF 99 05/10/2023    VLDL 25 05/10/2023     No results found for: \"LDLCALC\"    Lab Results   Component Value Date     (H) 01/01/2024    HGBA1C 7.4 (A) 05/10/2023       Lab Results   Component Value Date    TSH 3.07 12/08/2023       No results found for this or any previous visit.     Last Cardiology Tests:    Echo 12/26/23:    1. Left ventricular systolic function is low normal with a 50-55% estimated ejection fraction.   2. Spectral Doppler shows a pseudonormal pattern of left ventricular diastolic filling.   3. There are elevated left atrial and left ventricular end diastolic pressures.   4. There is low normal right ventricular systolic function.   5. Mildly elevated RVSP.    TTE 1/18/2023 showed LV systolic function is moderately decreased with an EF of 35%. Abnormal pattern of LV diastolic filling. Mild to moderate MR. Moderate tricuspid regurgitation.     LHC 12/1/2022 showed patent LIMA to LAD and SVG to circumflex/OM. Occluded SVG to RCA with nonhemodynamically significant disease of the RCA.     Carotid artery duplex 4/6/2022 showed findings are consistent with less than 50% stenosis of bilateral proximal ICA.    Visit Vitals  /70 " "  Pulse 68   Ht 1.575 m (5' 2\")   Wt 81.9 kg (180 lb 9.6 oz)   SpO2 95%   BMI 33.03 kg/m²   OB Status Postmenopausal   Smoking Status Never   BSA 1.89 m²       Assessment/Plan   The primary encounter diagnosis was Arteriosclerosis of coronary artery. Diagnoses of S/P CABG x 4, Chronic systolic heart failure (CMS/HCC), Dyslipidemia, Food insecurity, Dilated cardiomyopathy (CMS/HCC), CAD in native artery, Chronic fatigue, and Medication refill were also pertinent to this visit.    1. CAD s/p CABG April 2022  LHC December 2022 with patent LIMA to LAD and SVG to the circumflex/OM, but occluded SVG to RCA  TTE January 2023 with LVEF 35%  Repeat TTE Dec 2023 with LVEF 50-55%  Continue aspirin 81 mg daily, carvedilol 6.25 mg twice daily and atorvastatin 80 mg daily     2. Post op afib  [id number] score is 6  She is on apixaban 5 mg twice daily which should be continued  Offered to switch to warfarin due to cost of apixaban. She declines and would like to continue apixaban.  Continue carvedilol 6.25 mg twice daily     3. Chronic systolic heart failure - EF improved  TTE January 2023 with LVEF 35%  Repeat TTE Dec 2023 with LVEF 50-55%  Not volume overloaded on exam  Entresto was too expensive.  Continue GDMT: Carvedilol 6.25 mg twice daily, empagliflozin 25 mg daily (takes this dose for h/o DM)  Having dizziness and constant diarrhea  Decrease furosemide 10  mg daily  Stop lisinopril as she c/o cough  Start losartan 25 mg daily    4. Dyslipidemia  Goal LDL less than 70. She is not at goal.  Continue atorvastatin 80 mg daily  Continue ezetimibe 10 mg daily  I would prefer PCSK9 inhibitor, but patient is having issues with medications costs so we will avoid PCSK9 inhibitors at this time.   Check fasting lipid panel in 3 months    5. Diarrhea, anemia, fatigue  She states she had had diarrhea for months.  Denies any blood in stool.  Last colonoscopy ~10 years ago  Refer to CARMELO Calle, APRN-CNP   "

## 2024-01-19 ENCOUNTER — OFFICE VISIT (OUTPATIENT)
Dept: CARDIOLOGY | Facility: CLINIC | Age: 74
End: 2024-01-19
Payer: MEDICARE

## 2024-01-19 VITALS
HEART RATE: 68 BPM | HEIGHT: 62 IN | DIASTOLIC BLOOD PRESSURE: 70 MMHG | WEIGHT: 180.6 LBS | BODY MASS INDEX: 33.23 KG/M2 | SYSTOLIC BLOOD PRESSURE: 118 MMHG | OXYGEN SATURATION: 95 %

## 2024-01-19 DIAGNOSIS — I42.0 DILATED CARDIOMYOPATHY (MULTI): ICD-10-CM

## 2024-01-19 DIAGNOSIS — Z76.0 MEDICATION REFILL: ICD-10-CM

## 2024-01-19 DIAGNOSIS — D64.9 ANEMIA, UNSPECIFIED TYPE: ICD-10-CM

## 2024-01-19 DIAGNOSIS — E78.5 DYSLIPIDEMIA: ICD-10-CM

## 2024-01-19 DIAGNOSIS — I25.10 CAD IN NATIVE ARTERY: ICD-10-CM

## 2024-01-19 DIAGNOSIS — I25.10 ARTERIOSCLEROSIS OF CORONARY ARTERY: Primary | ICD-10-CM

## 2024-01-19 DIAGNOSIS — R53.82 CHRONIC FATIGUE: ICD-10-CM

## 2024-01-19 DIAGNOSIS — Z59.41 FOOD INSECURITY: ICD-10-CM

## 2024-01-19 DIAGNOSIS — Z95.1 S/P CABG X 4: ICD-10-CM

## 2024-01-19 DIAGNOSIS — I50.22 CHRONIC SYSTOLIC HEART FAILURE (MULTI): ICD-10-CM

## 2024-01-19 DIAGNOSIS — R19.7 DIARRHEA, UNSPECIFIED TYPE: ICD-10-CM

## 2024-01-19 PROCEDURE — 4010F ACE/ARB THERAPY RXD/TAKEN: CPT | Performed by: NURSE PRACTITIONER

## 2024-01-19 PROCEDURE — 1126F AMNT PAIN NOTED NONE PRSNT: CPT | Performed by: NURSE PRACTITIONER

## 2024-01-19 PROCEDURE — 1160F RVW MEDS BY RX/DR IN RCRD: CPT | Performed by: NURSE PRACTITIONER

## 2024-01-19 PROCEDURE — 3074F SYST BP LT 130 MM HG: CPT | Performed by: NURSE PRACTITIONER

## 2024-01-19 PROCEDURE — 99214 OFFICE O/P EST MOD 30 MIN: CPT | Performed by: NURSE PRACTITIONER

## 2024-01-19 PROCEDURE — 1036F TOBACCO NON-USER: CPT | Performed by: NURSE PRACTITIONER

## 2024-01-19 PROCEDURE — 1159F MED LIST DOCD IN RCRD: CPT | Performed by: NURSE PRACTITIONER

## 2024-01-19 PROCEDURE — 3078F DIAST BP <80 MM HG: CPT | Performed by: NURSE PRACTITIONER

## 2024-01-19 PROCEDURE — 3008F BODY MASS INDEX DOCD: CPT | Performed by: NURSE PRACTITIONER

## 2024-01-19 RX ORDER — FUROSEMIDE 20 MG/1
10 TABLET ORAL DAILY
Qty: 90 TABLET | Refills: 1 | Status: SHIPPED | OUTPATIENT
Start: 2024-01-19

## 2024-01-19 RX ORDER — ATORVASTATIN CALCIUM 80 MG/1
80 TABLET, FILM COATED ORAL NIGHTLY
Qty: 90 TABLET | Refills: 3 | Status: SHIPPED | OUTPATIENT
Start: 2024-01-19 | End: 2025-01-18

## 2024-01-19 RX ORDER — EZETIMIBE 10 MG/1
10 TABLET ORAL DAILY
Qty: 90 TABLET | Refills: 3 | Status: SHIPPED | OUTPATIENT
Start: 2024-01-19 | End: 2025-01-13

## 2024-01-19 RX ORDER — LOSARTAN POTASSIUM 25 MG/1
25 TABLET ORAL DAILY
Qty: 90 TABLET | Refills: 1 | Status: SHIPPED | OUTPATIENT
Start: 2024-01-19 | End: 2024-07-17

## 2024-01-19 RX ORDER — FUROSEMIDE 20 MG/1
20 TABLET ORAL DAILY
Qty: 90 TABLET | Refills: 1 | Status: SHIPPED | OUTPATIENT
Start: 2024-01-19 | End: 2024-01-19 | Stop reason: SDUPTHER

## 2024-01-19 SDOH — ECONOMIC STABILITY - FOOD INSECURITY: FOOD INSECURITY: Z59.41

## 2024-01-19 ASSESSMENT — ENCOUNTER SYMPTOMS
DYSPNEA ON EXERTION: 1
DIARRHEA: 1
DIZZINESS: 1

## 2024-01-23 ENCOUNTER — TELEPHONE (OUTPATIENT)
Dept: CARDIOLOGY | Facility: CLINIC | Age: 74
End: 2024-01-23

## 2024-01-23 ENCOUNTER — LAB (OUTPATIENT)
Dept: LAB | Facility: LAB | Age: 74
End: 2024-01-23
Payer: MEDICARE

## 2024-01-23 DIAGNOSIS — E78.5 DYSLIPIDEMIA: ICD-10-CM

## 2024-01-23 LAB
CHOLEST SERPL-MCNC: 123 MG/DL (ref 0–199)
CHOLESTEROL/HDL RATIO: 2.4
HDLC SERPL-MCNC: 50.9 MG/DL
LDLC SERPL CALC-MCNC: 47 MG/DL
NON HDL CHOLESTEROL: 72 MG/DL (ref 0–149)
TRIGL SERPL-MCNC: 124 MG/DL (ref 0–149)
VLDL: 25 MG/DL (ref 0–40)

## 2024-01-23 PROCEDURE — 80061 LIPID PANEL: CPT

## 2024-01-23 PROCEDURE — 36415 COLL VENOUS BLD VENIPUNCTURE: CPT

## 2024-01-23 NOTE — TELEPHONE ENCOUNTER
----- Message from ACACIA Alaniz sent at 1/23/2024 11:17 AM EST -----  Lipid look much better and LDL is at goal.  Continue current medications.

## 2024-01-23 NOTE — TELEPHONE ENCOUNTER
I called and spoke with patient and went over lab results. I also went over medication changes from 1/19/24 visit and she denies questions.

## 2024-01-23 NOTE — TELEPHONE ENCOUNTER
I tried calling patient's home # and just rings. Cell phone doesn't have her name on voicemail. Will try to call patient again later.

## 2024-02-16 DIAGNOSIS — M25.551 HIP PAIN, BILATERAL: ICD-10-CM

## 2024-02-16 DIAGNOSIS — M25.552 HIP PAIN, BILATERAL: ICD-10-CM

## 2024-02-19 ENCOUNTER — HOSPITAL ENCOUNTER (OUTPATIENT)
Dept: RADIOLOGY | Facility: HOSPITAL | Age: 74
Discharge: HOME | End: 2024-02-19
Payer: MEDICARE

## 2024-02-19 DIAGNOSIS — M25.551 HIP PAIN, BILATERAL: ICD-10-CM

## 2024-02-19 DIAGNOSIS — M25.552 HIP PAIN, BILATERAL: ICD-10-CM

## 2024-02-19 PROCEDURE — 73522 X-RAY EXAM HIPS BI 3-4 VIEWS: CPT

## 2024-02-20 ENCOUNTER — OFFICE VISIT (OUTPATIENT)
Dept: ORTHOPEDIC SURGERY | Facility: CLINIC | Age: 74
End: 2024-02-20
Payer: MEDICARE

## 2024-02-20 VITALS — WEIGHT: 180 LBS | HEIGHT: 62 IN | BODY MASS INDEX: 33.13 KG/M2

## 2024-02-20 DIAGNOSIS — M54.16 LUMBAR RADICULOPATHY: ICD-10-CM

## 2024-02-20 DIAGNOSIS — M54.16 LUMBAR RADICULOPATHY: Primary | ICD-10-CM

## 2024-02-20 PROCEDURE — 1036F TOBACCO NON-USER: CPT | Performed by: SPECIALIST

## 2024-02-20 PROCEDURE — 1159F MED LIST DOCD IN RCRD: CPT | Performed by: SPECIALIST

## 2024-02-20 PROCEDURE — 3008F BODY MASS INDEX DOCD: CPT | Performed by: SPECIALIST

## 2024-02-20 PROCEDURE — 4010F ACE/ARB THERAPY RXD/TAKEN: CPT | Performed by: SPECIALIST

## 2024-02-20 PROCEDURE — 1126F AMNT PAIN NOTED NONE PRSNT: CPT | Performed by: SPECIALIST

## 2024-02-20 PROCEDURE — 99214 OFFICE O/P EST MOD 30 MIN: CPT | Performed by: SPECIALIST

## 2024-02-20 PROCEDURE — 1157F ADVNC CARE PLAN IN RCRD: CPT | Performed by: SPECIALIST

## 2024-02-20 PROCEDURE — 3048F LDL-C <100 MG/DL: CPT | Performed by: SPECIALIST

## 2024-02-20 ASSESSMENT — PAIN - FUNCTIONAL ASSESSMENT: PAIN_FUNCTIONAL_ASSESSMENT: 0-10

## 2024-02-20 ASSESSMENT — PAIN SCALES - GENERAL: PAINLEVEL_OUTOF10: 8

## 2024-02-20 NOTE — PROGRESS NOTES
Follow up bilateral buttock region hip pain.  S/P Right total hip arthroplasty done 08/01/2022.  She states her pain is in her butt on both sides and down her legs.  She does have pain into her low back.  She states she fell getting out of her car a few months ago and fell backwards.   Patient denies constitutional symptoms, ROS otherwise negative    Physical examination:    Examination of the bilateral hip  The incision is remote well-healed on right  No erythema or warmth.  Gait is intact without pathologic components  Range of motion: Is supple and without significant pain or restriction  The Patient's single leg stand and actively abduct, motor exam all intact with appropriate stength  Calf is soft, Homans negative  The patient has intact ankle dorsiflexion and plantarflexion.  Neurovascular exam otherwise within normal    Positive pain to palpation of the lower spine, with positive straight leg raise testing.  Distal neurovascular status intact  Radiographs: AP pelvis AP and lateral bilateral hips show a stable intact right total hip arthroplasty implant.  Left hip is unremarkable.    Impression:  Status post right total hip arthroplasty.  Patient is reassured her arthroplasty is stable, and per exam and history the source of her pain is her lower lumbar spine.    Plan:  We have her set up to see someone for her lower back pain syndrome she will need up-to-date x-rays of her lumbar spine at that visit.  Discussed the continued importance of prophylactic dental antibiotics  All questions answered    This note was dictated using speech recognition software and was not corrected for spelling or grammatical errors

## 2024-03-01 ENCOUNTER — HOSPITAL ENCOUNTER (OUTPATIENT)
Dept: RADIOLOGY | Facility: HOSPITAL | Age: 74
Discharge: HOME | End: 2024-03-01
Payer: MEDICARE

## 2024-03-01 DIAGNOSIS — M54.16 LUMBAR RADICULOPATHY: ICD-10-CM

## 2024-03-01 PROCEDURE — 72100 X-RAY EXAM L-S SPINE 2/3 VWS: CPT

## 2024-03-01 PROCEDURE — 72100 X-RAY EXAM L-S SPINE 2/3 VWS: CPT | Performed by: RADIOLOGY

## 2024-03-07 ENCOUNTER — CONSULT (OUTPATIENT)
Dept: PHYSICAL MEDICINE AND REHAB | Facility: CLINIC | Age: 74
End: 2024-03-07
Payer: MEDICARE

## 2024-03-07 VITALS — SYSTOLIC BLOOD PRESSURE: 137 MMHG | HEART RATE: 60 BPM | RESPIRATION RATE: 22 BRPM | DIASTOLIC BLOOD PRESSURE: 54 MMHG

## 2024-03-07 DIAGNOSIS — M54.16 RIGHT LUMBAR RADICULITIS: Primary | ICD-10-CM

## 2024-03-07 DIAGNOSIS — M47.816 LUMBAR SPONDYLOSIS: ICD-10-CM

## 2024-03-07 PROCEDURE — 99204 OFFICE O/P NEW MOD 45 MIN: CPT | Performed by: PHYSICAL MEDICINE & REHABILITATION

## 2024-03-07 RX ORDER — PREGABALIN 25 MG/1
25 CAPSULE ORAL 2 TIMES DAILY
Qty: 60 CAPSULE | Refills: 2 | Status: SHIPPED | OUTPATIENT
Start: 2024-03-07 | End: 2024-05-30

## 2024-03-07 ASSESSMENT — PAIN SCALES - GENERAL: PAINLEVEL: 8

## 2024-03-07 NOTE — PATIENT INSTRUCTIONS
1,. Stop Ibuprofen  2. Tylenol 1000 mg three times a day as needed  3. Start Lyrica 25 mg at night for one week and then increase to 1 cap am and 1 cap pm  4.physical therapy/water therapy  5, heal lift for left foot, wear tennis shoes

## 2024-03-07 NOTE — PROGRESS NOTES
Physical Medicine and Rehabilitation [facility] Consult  03/07/24       Chief Complaint:  Low back pain     HPI:  Victorina Adamson is a  74 y.o. F who presents to the clinic today  for evaluation of low back pain.  Onset: chronic back pain since age 24  Flaired up after hip surgery 8/2022, much worse in the few months  Has had a few falls on and off last 3 months  Looses balance, has lightheadedness  Location: right buttock tailbone, radiates lateral thigh  Radiation: as above  Quality: sharp  Duration: constant  Aggravating factors:  standing and walking, bending, sitting down, laying down  Alleviating factors: nothing  Severity: 7  Numbness/Tingling: No  Bowel or bladder incontinence: No  Pt's current medication regimen includes: tylenol 1000 mg at night and during the day 600 mg     Treatment to date:  Physical therapy: No  Medications taken to date for this complaint include the following:   As above  Prior injections: No        Imaging  Reviewed w patient and personally 03/07/24    Xr hips 2/2024  FINDINGS:  No acute fracture or malalignment.  Status post right total hip arthroplasty. Hardware is intact without  perihardware fractures or lucencies. Mild left hip joint degenerative  changes. Lower lumbar spine degenerative changes as well. Soft  tissues are within normal limits.      IMPRESSION:  1. Right total hip arthroplasty without hardware complication.  2. Mild left hip osteoarthrosis. Lower lumbar spine degenerative  changes.  Xr l spine 3/2024  FINDINGS:  Lumbar spine, four views      There is moderate disc space narrowing osteophytosis at L1-L2, L2-L3  and L5-S1. There is moderate facet disease lower lumbar spine. No  fracture or spondylolisthesis seen      IMPRESSION:  Moderate multilevel spondylosis in the lumbar spine. No acute  abnormality     Past Medical History:   Diagnosis Date    Acute upper respiratory infection, unspecified 02/11/2022    URI, acute    Anxiety disorder, unspecified     Anxiety and  depression    Chronic tension-type headache, not intractable     Chronic tension headaches    Diverticulosis of intestine, part unspecified, without perforation or abscess without bleeding     Diverticulosis    Encounter for general adult medical examination without abnormal findings 09/20/2021    Medicare annual wellness visit, subsequent    Encounter for other screening for malignant neoplasm of breast 02/11/2021    Breast cancer screening    Encounter for preprocedural cardiovascular examination     Pre-operative cardiovascular examination    Encounter for screening for malignant neoplasm of colon 02/11/2021    Colon cancer screening    Lumbago with sciatica, right side 09/20/2021    Acute right-sided low back pain with right-sided sciatica    Major depressive disorder, single episode, mild (CMS/HCC) 09/20/2021    Depression, major, single episode, mild    Obesity, unspecified 06/22/2020    Obesity (BMI 30-39.9)    Other intervertebral disc degeneration, lumbar region 03/15/2022    Degeneration of intervertebral disc of lumbar region    Other specified health status 06/22/2020    Intolerance of continuous positive airway pressure (CPAP) ventilation    Other unilateral secondary osteoarthritis of hip     Other secondary osteoarthritis of right hip    Pain in right hip 10/02/2021    Hip pain, right    Personal history of other diseases of the digestive system     History of gastroesophageal reflux (GERD)    Personal history of other diseases of the digestive system     History of diverticulitis    Personal history of other diseases of the musculoskeletal system and connective tissue 06/08/2021    History of arthritis    Personal history of other diseases of the nervous system and sense organs 03/28/2022    History of acute otitis media    Personal history of other infectious and parasitic diseases 06/08/2021    History of candidiasis of vagina    Personal history of other infectious and parasitic diseases  06/18/2020    History of herpes zoster    Personal history of other mental and behavioral disorders 03/15/2022    History of depression    Personal history of other specified conditions     History of vertigo    Personal history of other specified conditions 02/11/2021    History of dysphagia    Personal history of other specified conditions 06/22/2020    History of insomnia    Spinal stenosis, lumbosacral region 11/23/2021    Spinal stenosis of lumbosacral region    Spondylosis without myelopathy or radiculopathy, lumbar region 11/30/2021    Lumbar spondylosis    Unspecified fall, subsequent encounter 09/20/2021    Fall at home, subsequent encounter   HTN hlp  Dm, eliquis after CAD     Past Surgical History:   Procedure Laterality Date    IR INJECTION JOINT  10/13/2021    IR INJECTION JOINT 10/13/2021 P[facility]    OTHER SURGICAL HISTORY  06/17/2020    Cholecystectomy    OTHER SURGICAL HISTORY  06/17/2020    Tubal ligation    OTHER SURGICAL HISTORY  06/17/2020    Tonsillectomy    OTHER SURGICAL HISTORY  05/19/2022    Coronary artery bypass graft    OTHER SURGICAL HISTORY  05/26/2022    Heart surgery    OTHER SURGICAL HISTORY  06/01/2022    Coronary artery bypass graft    OTHER SURGICAL HISTORY  03/15/2022    Cardiac catheterization    OTHER SURGICAL HISTORY  02/11/2021    Cataract surgery    OTHER SURGICAL HISTORY  10/18/2022    Hip replacement    OTHER SURGICAL HISTORY  09/20/2021    Carpal tunnel surgery        Patient Active Problem List    Diagnosis Date Noted    Diarrhea 01/19/2024    Dyslipidemia 01/18/2024    Acute postoperative pain 01/10/2024    Candidiasis of vagina 01/10/2024    Carpal tunnel syndrome 01/10/2024    Chronic systolic heart failure (CMS/HCC) 01/10/2024    Chronic tension headache 01/10/2024    Diverticular disease 01/10/2024    Dysphagia 01/10/2024    Herpes zoster 01/10/2024    Insomnia 01/10/2024    Osteoporosis 01/10/2024    Sleep-disordered breathing 01/10/2024    Subacute vaginitis  01/10/2024    Hypoglycemia 01/10/2024    Fatigue 12/08/2023    Anxiety 04/05/2023    Mixed anxiety depressive disorder 04/05/2023    Hypomagnesemia 03/21/2023    Food insecurity 03/10/2023    Cardiomyopathy (CMS/HCC) 02/28/2023    Drug-induced hypoglycemia 02/28/2023    Essential hypertension 02/28/2023    Mixed hyperlipidemia 02/28/2023    Hypothyroidism 02/28/2023    Obstructive sleep apnea (adult) (pediatric) 02/28/2023    Open wound of left chest wall without complication 02/28/2023    Physical deconditioning 02/28/2023    Primary osteoarthritis of right hip 02/28/2023    Recurrent major depressive disorder (CMS/HCC) 02/28/2023    S/P CABG x 4 02/28/2023    History of total hip replacement 02/28/2023    Cobalamin deficiency 02/28/2023    Benign paroxysmal positional vertigo 02/28/2023    Pulmonary congestion 02/28/2023    Acute combined systolic and diastolic congestive heart failure (CMS/HCC) 02/28/2023    Iron deficiency anemia 02/23/2023    Mild acid reflux 02/23/2023    Contact with and (suspected) exposure to covid-19 02/23/2023    Hypokalemia 02/23/2023    Other specified cough 01/30/2023    Type 2 diabetes mellitus without complication (CMS/HCC) 12/02/2022    Atrial fibrillation (CMS/HCC) 12/02/2022    Atherosclerosis of autologous vein coronary artery bypass graft 12/02/2022    Acute exacerbation of chronic obstructive pulmonary disease (CMS/HCC) 12/02/2022    Anemia 12/02/2022    Other underimmunization status 12/02/2022    Unvaccinated for covid-19 12/02/2022    Chest pain 11/28/2022    Pneumonia 11/26/2022    Arteriosclerosis of coronary artery 04/21/2022    Congestive heart failure (CMS/HCC) 02/19/2022    Shortness of breath 02/18/2022        Family History   Problem Relation Name Age of Onset    Hypertension Mother          benign essential    Arthritis Mother      Coronary artery disease Mother      Depression Mother      Diabetes Mother      Other (substance abuse) Mother      Lung cancer Father       Other (substance abuse) Father      Diabetes Sister      Hypertension Brother          benign essential    Lung cancer Brother          Current Outpatient Medications   Medication Sig Dispense Refill    acetaminophen (Tylenol) 500 mg tablet Take 2 tablets (1,000 mg) by mouth every 6 hours if needed for mild pain (1 - 3).      albuterol 90 mcg/actuation inhaler Inhale 2 puffs every 4 hours if needed for wheezing or shortness of breath (inhale 2 puffs into the lungs every 4 to 6 hours as needed). 18 g 11    apixaban (Eliquis) 5 mg tablet TAKE 1 TABLET BY MOUTH TWO TIMES A  tablet 3    aspirin 81 mg EC tablet Take 1 tablet (81 mg) by mouth once daily.      atorvastatin (Lipitor) 80 mg tablet Take 1 tablet (80 mg) by mouth once daily at bedtime. 90 tablet 3    blood glucose control, low (True Metrix Level 1) solution Apply 1 each topically see administration instructions. 1 each 0    carvedilol (Coreg) 6.25 mg tablet TAKE 1 TABLET BY MOUTH TWICE DAILY WITH MEALS 180 tablet 3    DropSafe Alcohol Prep Pads pads, medicated USE AS DIRECTED 100 each 10    DULoxetine (Cymbalta) 60 mg DR capsule Take 1 capsule by mouth once daily 30 capsule 0    empagliflozin (Jardiance) 25 mg Take 1 tablet (25 mg) by mouth once daily. 90 tablet 3    ferrous sulfate 325 (65 Fe) MG EC tablet Take 1 tablet (65 mg) by mouth 3 times a day with meals. Do not crush, chew, or split.      furosemide (Lasix) 20 mg tablet Take 0.5 tablets (10 mg) by mouth once daily. 90 tablet 1    levothyroxine (Synthroid, Levoxyl) 50 mcg tablet Take 1 tablet (50 mcg) by mouth once daily. 30 tablet 1    metFORMIN (Glucophage) 1,000 mg tablet TAKE 1 TABLET BY MOUTH IN THE MORNING AND 1 AT BEDTIME 180 tablet 0    pantoprazole (ProtoNix) 40 mg EC tablet TAKE 1 TABLET (40 MG) BY MOUTH ONCE DAILY. 90 tablet 1    sertraline (Zoloft) 100 mg tablet TAKE 1 TABLET (100 MG) BY MOUTH ONCE DAILY. 90 tablet 1    True Metrix Air Glucose Meter kit       True Metrix Glucose  Test Strip strip USE AS DIRECTED 200 strip 10    TRUEplus Lancets 33 gauge misc USE AS DIRECTED 200 each 10    blood-glucose meter (True Metrix Air Glucose Meter) misc Apply 1 each topically see administration instructions. 1 each 0    dextromethorphan HBr 15 mg capsule Take 1 capsule by mouth as needed at bedtime (cough). (Patient not taking: Reported on 3/7/2024) 28 capsule 2    ezetimibe (Zetia) 10 mg tablet Take 1 tablet (10 mg) by mouth once daily. 90 tablet 3    losartan (Cozaar) 25 mg tablet Take 1 tablet (25 mg) by mouth once daily. 90 tablet 1     No current facility-administered medications for this visit.        Allergies   Allergen Reactions    Gabapentin Unknown    Hydrocodone-Acetaminophen Unknown    Influenza Virus Vaccines Unknown    Lisinopril Cough    Oxycodone Unknown        Social History     Socioeconomic History    Marital status:      Spouse name: None    Number of children: None    Years of education: None    Highest education level: None   Occupational History    None   Tobacco Use    Smoking status: Never    Smokeless tobacco: Never   Vaping Use    Vaping Use: Never used   Substance and Sexual Activity    Alcohol use: Yes     Comment: goes out once a month    Drug use: Never    Sexual activity: Not Currently     Partners: Male   Other Topics Concern    None   Social History Narrative    None     Social Determinants of Health     Financial Resource Strain: Not on file   Food Insecurity: Not on file   Transportation Needs: Not on file   Physical Activity: Not on file   Stress: Not on file   Social Connections: Not on file   Intimate Partner Violence: Not on file   Housing Stability: Not on file   Lives w son  Retired, used to be a  for [employer]  No smoking alcohol or drug use  Lives in a trailor, one level, 5 steps to get in     Review of Systems:  Constitutional:  Denies fever or chills, malaise, weight changes.   Eyes:  Denies change in visual acuity   HENT:  Denies nasal  congestion or sore throat   Respiratory:  Denies cough or shortness of breath   Cardiovascular:  Denies chest pain or edema   GI:  Denies abdominal pain, nausea, vomiting, bloody stools or diarrhea   :  Denies dysuria   Integument:  Denies rash   Neurologic:  As per HPI  MSK: Per above HPI  Endocrine:  Denies polyuria or polydipsia   Lymphatic:  Denies swollen glands   Psychiatric:  Denies depression or anxiety            PHYSICAL EXAM:  /54 (BP Location: Left arm, Patient Position: Sitting)   Pulse 60   Resp 22     General:  NAD, well developed, F      Psychiatric: appropriate mood & affect.   Cardiovascular:  Normal pedal pulses; no LE edema.  Respiratory:  Normal rate; unlabored breathing.  Skin:  Intact; no erythema; no ecchymosis or rash.  Lymphatic:  No lymphadenopathy or lymphedema.  NEURO:  Alert and appropriate. Speech fluent, conversing appropriately.   Motor:    Rt: HF 5/5, KE 5/5, KF 5/5, DF 5/5, EHL 5/5, PF 5/5.    Lt: HF 5/5, KE 5/5, KF 5/5, DF 5/5, EHL 5/5, PF 5/5.  Sensation:     Light touch: intact in the b/l L3-S1 dermatomes.    PP: intact in the b/l L3-S1 dermatomes  Reflexes:     Right:  patellar 2+, achilles 2+,     Left: patellar 2+, achilles 2+,     Babinski's downgoing b/l; no clonus  Gait: Normal, narrow based gait.     MSK:  Inspection reveals no evidence of a pelvic obliqity   Spinal range of motion: Flexion to 40° degrees, Extension of neutral degrees.  There is tenderness over the L paraspinals psis and buttock.   Special tests:    Straight leg raise: + on R    Slump test: + on R    Facet loading: - bl          Impression: Victorina Adamson is a 74 y.o. M with pmh of htn, hlp, cad on eliquis, dm, sp R hip replacement presents with L5 radiculitis oin setting of severe spondylosis, In addition she has leg length discrepancy could be related to the hip replacement that is throwing off her gait and subsequently also contributing to her pain.    Plan:  1,. Stop Ibuprofen; on eliquis  and impaired renal function  2. Tylenol 1000 mg three times a day as needed  3. Start Lyrica 25 mg at night for one week and then increase to 1 cap am and 1 cap pm.titration explained.  4. physical therapy/water therapy to work on core, rom, hep  5, heal lift for left foot for leg length discrepancy wear tennis shoes w more support       Thank you for the consultation.     Luz Ochoa MD  Physical Medicine and Rehabilitation

## 2024-03-21 DIAGNOSIS — E11.22 TYPE 2 DIABETES MELLITUS WITH STAGE 3A CHRONIC KIDNEY DISEASE, WITHOUT LONG-TERM CURRENT USE OF INSULIN (MULTI): ICD-10-CM

## 2024-03-21 DIAGNOSIS — N18.31 TYPE 2 DIABETES MELLITUS WITH STAGE 3A CHRONIC KIDNEY DISEASE, WITHOUT LONG-TERM CURRENT USE OF INSULIN (MULTI): ICD-10-CM

## 2024-03-21 RX ORDER — METFORMIN HYDROCHLORIDE 1000 MG/1
1000 TABLET ORAL
Qty: 180 TABLET | Refills: 3 | Status: SHIPPED | OUTPATIENT
Start: 2024-03-21

## 2024-03-21 NOTE — TELEPHONE ENCOUNTER
Establishing 3/29/24, former [facility] patient, order pended in case you are okay with sending before NPV.

## 2024-03-26 PROBLEM — J30.2 SEASONAL ALLERGIC RHINITIS: Status: ACTIVE | Noted: 2020-04-29

## 2024-03-26 RX ORDER — FLUTICASONE PROPIONATE 50 MCG
SPRAY, SUSPENSION (ML) NASAL
COMMUNITY
End: 2024-05-30 | Stop reason: ALTCHOICE

## 2024-03-26 RX ORDER — MECLIZINE HYDROCHLORIDE 25 MG/1
TABLET ORAL
COMMUNITY
End: 2024-05-30 | Stop reason: ALTCHOICE

## 2024-03-29 PROBLEM — E78.5 DM TYPE 2 WITH DIABETIC DYSLIPIDEMIA (MULTI): Chronic | Status: ACTIVE | Noted: 2024-01-18

## 2024-03-29 PROBLEM — I50.41 ACUTE COMBINED SYSTOLIC AND DIASTOLIC CONGESTIVE HEART FAILURE (MULTI): Chronic | Status: ACTIVE | Noted: 2023-02-28

## 2024-03-29 PROBLEM — E11.69 DM TYPE 2 WITH DIABETIC DYSLIPIDEMIA (MULTI): Status: ACTIVE | Noted: 2024-01-18

## 2024-03-29 PROBLEM — I50.22 CHRONIC SYSTOLIC HEART FAILURE (MULTI): Status: RESOLVED | Noted: 2024-01-10 | Resolved: 2024-03-29

## 2024-03-29 PROBLEM — E11.69 TYPE 2 DIABETES MELLITUS WITH OTHER SPECIFIED COMPLICATION (MULTI): Status: ACTIVE | Noted: 2022-12-02

## 2024-03-29 PROBLEM — E11.69 DM TYPE 2 WITH DIABETIC DYSLIPIDEMIA (MULTI): Chronic | Status: ACTIVE | Noted: 2024-01-18

## 2024-03-29 PROBLEM — Z00.00 MEDICARE ANNUAL WELLNESS VISIT, SUBSEQUENT: Status: ACTIVE | Noted: 2024-03-29

## 2024-03-29 PROBLEM — I50.9 CONGESTIVE HEART FAILURE (MULTI): Status: RESOLVED | Noted: 2022-02-19 | Resolved: 2024-03-29

## 2024-03-29 PROBLEM — E03.9 HYPOTHYROIDISM: Chronic | Status: ACTIVE | Noted: 2023-02-28

## 2024-03-29 PROBLEM — E11.9 TYPE 2 DIABETES MELLITUS WITHOUT COMPLICATION (MULTI): Chronic | Status: ACTIVE | Noted: 2022-12-02

## 2024-03-29 PROBLEM — I48.91 ATRIAL FIBRILLATION (MULTI): Chronic | Status: ACTIVE | Noted: 2022-12-02

## 2024-04-23 ENCOUNTER — TELEPHONE (OUTPATIENT)
Dept: CARDIOLOGY | Facility: CLINIC | Age: 74
End: 2024-04-23

## 2024-04-23 ENCOUNTER — OFFICE VISIT (OUTPATIENT)
Dept: CARDIOLOGY | Facility: CLINIC | Age: 74
End: 2024-04-23
Payer: MEDICARE

## 2024-04-23 ENCOUNTER — LAB (OUTPATIENT)
Dept: LAB | Facility: LAB | Age: 74
End: 2024-04-23
Payer: MEDICARE

## 2024-04-23 VITALS
DIASTOLIC BLOOD PRESSURE: 68 MMHG | BODY MASS INDEX: 34.93 KG/M2 | WEIGHT: 191 LBS | HEART RATE: 60 BPM | SYSTOLIC BLOOD PRESSURE: 156 MMHG

## 2024-04-23 DIAGNOSIS — R73.9 HYPERGLYCEMIA: ICD-10-CM

## 2024-04-23 DIAGNOSIS — I42.0 DILATED CARDIOMYOPATHY (MULTI): ICD-10-CM

## 2024-04-23 DIAGNOSIS — R19.7 DIARRHEA, UNSPECIFIED TYPE: ICD-10-CM

## 2024-04-23 DIAGNOSIS — D64.9 ANEMIA, UNSPECIFIED TYPE: ICD-10-CM

## 2024-04-23 DIAGNOSIS — I50.22 CHRONIC SYSTOLIC HEART FAILURE (MULTI): ICD-10-CM

## 2024-04-23 DIAGNOSIS — I25.10 CAD IN NATIVE ARTERY: ICD-10-CM

## 2024-04-23 DIAGNOSIS — E78.5 DYSLIPIDEMIA: ICD-10-CM

## 2024-04-23 DIAGNOSIS — I25.10 ARTERIOSCLEROSIS OF CORONARY ARTERY: ICD-10-CM

## 2024-04-23 DIAGNOSIS — Z95.1 S/P CABG X 4: ICD-10-CM

## 2024-04-23 DIAGNOSIS — Z59.41 FOOD INSECURITY: ICD-10-CM

## 2024-04-23 DIAGNOSIS — Z76.0 MEDICATION REFILL: ICD-10-CM

## 2024-04-23 DIAGNOSIS — R53.82 CHRONIC FATIGUE: ICD-10-CM

## 2024-04-23 LAB
ALBUMIN SERPL BCP-MCNC: 4 G/DL (ref 3.4–5)
ALP SERPL-CCNC: 69 U/L (ref 33–136)
ALT SERPL W P-5'-P-CCNC: 23 U/L (ref 7–45)
ANION GAP SERPL CALC-SCNC: 9 MMOL/L (ref 10–20)
AST SERPL W P-5'-P-CCNC: 16 U/L (ref 9–39)
BASOPHILS # BLD AUTO: 0.04 X10*3/UL (ref 0–0.1)
BASOPHILS NFR BLD AUTO: 0.5 %
BILIRUB SERPL-MCNC: 0.4 MG/DL (ref 0–1.2)
BUN SERPL-MCNC: 19 MG/DL (ref 6–23)
CALCIUM SERPL-MCNC: 8.8 MG/DL (ref 8.6–10.3)
CHLORIDE SERPL-SCNC: 109 MMOL/L (ref 98–107)
CO2 SERPL-SCNC: 29 MMOL/L (ref 21–32)
CREAT SERPL-MCNC: 0.94 MG/DL (ref 0.5–1.05)
EGFRCR SERPLBLD CKD-EPI 2021: 63 ML/MIN/1.73M*2
EOSINOPHIL # BLD AUTO: 0.39 X10*3/UL (ref 0–0.4)
EOSINOPHIL NFR BLD AUTO: 4.4 %
ERYTHROCYTE [DISTWIDTH] IN BLOOD BY AUTOMATED COUNT: 15.4 % (ref 11.5–14.5)
EST. AVERAGE GLUCOSE BLD GHB EST-MCNC: 232 MG/DL
GLUCOSE SERPL-MCNC: 190 MG/DL (ref 74–99)
HBA1C MFR BLD: 9.7 %
HCT VFR BLD AUTO: 38.3 % (ref 36–46)
HGB BLD-MCNC: 11 G/DL (ref 12–16)
IMM GRANULOCYTES # BLD AUTO: 0.04 X10*3/UL (ref 0–0.5)
IMM GRANULOCYTES NFR BLD AUTO: 0.5 % (ref 0–0.9)
LYMPHOCYTES # BLD AUTO: 1.86 X10*3/UL (ref 0.8–3)
LYMPHOCYTES NFR BLD AUTO: 20.9 %
MCH RBC QN AUTO: 25.6 PG (ref 26–34)
MCHC RBC AUTO-ENTMCNC: 28.7 G/DL (ref 32–36)
MCV RBC AUTO: 89 FL (ref 80–100)
MONOCYTES # BLD AUTO: 0.63 X10*3/UL (ref 0.05–0.8)
MONOCYTES NFR BLD AUTO: 7.1 %
NEUTROPHILS # BLD AUTO: 5.92 X10*3/UL (ref 1.6–5.5)
NEUTROPHILS NFR BLD AUTO: 66.6 %
NRBC BLD-RTO: 0 /100 WBCS (ref 0–0)
PLATELET # BLD AUTO: 255 X10*3/UL (ref 150–450)
POTASSIUM SERPL-SCNC: 4.3 MMOL/L (ref 3.5–5.3)
PROT SERPL-MCNC: 6.3 G/DL (ref 6.4–8.2)
RBC # BLD AUTO: 4.3 X10*6/UL (ref 4–5.2)
SODIUM SERPL-SCNC: 143 MMOL/L (ref 136–145)
T4 FREE SERPL-MCNC: 0.62 NG/DL (ref 0.61–1.12)
TSH SERPL-ACNC: 10.1 MIU/L (ref 0.44–3.98)
WBC # BLD AUTO: 8.9 X10*3/UL (ref 4.4–11.3)

## 2024-04-23 PROCEDURE — 3078F DIAST BP <80 MM HG: CPT | Performed by: INTERNAL MEDICINE

## 2024-04-23 PROCEDURE — 99213 OFFICE O/P EST LOW 20 MIN: CPT | Performed by: INTERNAL MEDICINE

## 2024-04-23 PROCEDURE — 84443 ASSAY THYROID STIM HORMONE: CPT

## 2024-04-23 PROCEDURE — 83036 HEMOGLOBIN GLYCOSYLATED A1C: CPT

## 2024-04-23 PROCEDURE — 1159F MED LIST DOCD IN RCRD: CPT | Performed by: INTERNAL MEDICINE

## 2024-04-23 PROCEDURE — 93000 ELECTROCARDIOGRAM COMPLETE: CPT | Performed by: INTERNAL MEDICINE

## 2024-04-23 PROCEDURE — 85025 COMPLETE CBC W/AUTO DIFF WBC: CPT

## 2024-04-23 PROCEDURE — 3077F SYST BP >= 140 MM HG: CPT | Performed by: INTERNAL MEDICINE

## 2024-04-23 PROCEDURE — 80053 COMPREHEN METABOLIC PANEL: CPT

## 2024-04-23 PROCEDURE — 36415 COLL VENOUS BLD VENIPUNCTURE: CPT

## 2024-04-23 PROCEDURE — 1157F ADVNC CARE PLAN IN RCRD: CPT | Performed by: INTERNAL MEDICINE

## 2024-04-23 PROCEDURE — 1036F TOBACCO NON-USER: CPT | Performed by: INTERNAL MEDICINE

## 2024-04-23 PROCEDURE — 84439 ASSAY OF FREE THYROXINE: CPT

## 2024-04-23 PROCEDURE — 3048F LDL-C <100 MG/DL: CPT | Performed by: INTERNAL MEDICINE

## 2024-04-23 PROCEDURE — 1160F RVW MEDS BY RX/DR IN RCRD: CPT | Performed by: INTERNAL MEDICINE

## 2024-04-23 PROCEDURE — 4010F ACE/ARB THERAPY RXD/TAKEN: CPT | Performed by: INTERNAL MEDICINE

## 2024-04-23 SDOH — ECONOMIC STABILITY - FOOD INSECURITY: FOOD INSECURITY: Z59.41

## 2024-04-23 ASSESSMENT — ENCOUNTER SYMPTOMS
DYSPNEA ON EXERTION: 1
DIZZINESS: 1

## 2024-04-23 NOTE — TELEPHONE ENCOUNTER
Attempted to call patient with results, elevated TSH and HgbA1c.  Dr. Brooks wants her to follow up with PCP for that.  Wanted to confirm she is still taking her diabetic medications and levothyroxine.  No answer.      Her visit with new PCP isn't until 7/25/24 with Dr. Amy Cummings.  Reached out to Jesenia at PCP office asking if they could possibly move up her visit.  She was going to call the patient and try to get her moved up.

## 2024-04-23 NOTE — PATIENT INSTRUCTIONS
Continue all current medications as prescribed.  Please have blood work drawn at your earliest convenience. You will be notified of the results once they become available.  Followup with Tigist Calle NP, in 6 months.      If you have any questions or cardiac concerns, please call our office at [phone].

## 2024-04-23 NOTE — LETTER
April 23, 2024     Milind Persaud MD  [address]  As Of 07/01/2020  Counts include 234 beds at the [facility] [id number]    Patient: Victorina Adamson   YOB: 1950   Date of Visit: 4/23/2024       Dear Dr. Milind Persaud MD:    Thank you for referring Victorina Adamson to me for evaluation. Below are my notes for this consultation.  If you have questions, please do not hesitate to call me. I look forward to following your patient along with you.       Sincerely,     Devyn Brooks MD      CC: No Recipients  ______________________________________________________________________________________    Counseling:  The patient was counseled regarding diagnostic results, instructions for management, risk factor reductions, prognosis, patient and family education, impressions, risks and benefits of treatment options and importance of compliance with treatment.      Chief Complaint:   The patient presents today for 3-month followup of CAD, a-fib, heart failure, and dyslipidemia.      History Of Present Illness:    Victorina Adamson is a 74 year old female patient who presents today for 3-month followup of CAD, a-fib, heart failure, and dyslipidemia. Her PMH is significant for arthritis, CAD s/p CABG x4 (LIMA-LAD, SVG-OM1 distally and SVG sequentially-RCA and PDA) 04/21/2022, atrial fibrillation s/p DCC 05/17/2022, CHF, depression, DM, HTN, hyperlipidemia, hypothyroidism, SHARON and h/o shingles. Over the past 3 months, the patient states that she has done well from a cardiac standpoint. She denies any CP or chest discomfort. She reports occasional mild exertional SOB. She also reports fatigue/tiredness and occasional dizziness. EKG today shows that the patient is maintaining NSR. The patient is compliant with her prescribed medications.     Last Recorded Vitals:  Vitals:    04/23/24 0902   BP: 156/68   Pulse: 60   Weight: 86.6 kg (191 lb)       Past Surgical History:  She has a past surgical history that includes Other surgical history (06/17/2020); Other  surgical history (06/17/2020); Other surgical history (06/17/2020); Other surgical history (05/19/2022); Other surgical history (05/26/2022); Other surgical history (06/01/2022); Other surgical history (03/15/2022); Other surgical history (02/11/2021); Other surgical history (10/18/2022); Other surgical history (09/20/2021); and IR injection joint (10/13/2021).      Social History:  She reports that she has never smoked. She has never used smokeless tobacco. She reports current alcohol use. She reports that she does not use drugs.    Family History:  Family History   Problem Relation Name Age of Onset   • Hypertension Mother          benign essential   • Arthritis Mother     • Coronary artery disease Mother     • Depression Mother     • Diabetes Mother     • Other (substance abuse) Mother     • Lung cancer Father     • Other (substance abuse) Father     • Diabetes Sister     • Hypertension Brother          benign essential   • Lung cancer Brother          Allergies:  Gabapentin, Hydrocodone-acetaminophen, Influenza virus vaccines, Lisinopril, and Oxycodone    Outpatient Medications:  Current Outpatient Medications   Medication Instructions   • acetaminophen (TYLENOL) 1,000 mg, oral, Every 6 hours PRN   • albuterol 90 mcg/actuation inhaler 2 puffs, inhalation, Every 4 hours PRN   • apixaban (Eliquis) 5 mg tablet TAKE 1 TABLET BY MOUTH TWO TIMES A DAY   • aspirin 81 mg, oral, Daily   • atorvastatin (LIPITOR) 80 mg, oral, Nightly   • blood glucose control, low (True Metrix Level 1) solution 1 each, Topical, See admin instructions   • blood-glucose meter (True Metrix Air Glucose Meter) misc 1 each, Topical, See admin instructions   • carvedilol (COREG) 6.25 mg, oral, 2 times daily with meals   • DropSafe Alcohol Prep Pads pads, medicated USE AS DIRECTED   • DULoxetine (CYMBALTA) 60 mg, oral, Daily   • empagliflozin (JARDIANCE) 25 mg, oral, Daily   • ezetimibe (ZETIA) 10 mg, oral, Daily   • ferrous sulfate 65 mg, oral, 3  times daily with meals, Do not crush, chew, or split.   • fluticasone (Flonase) 50 mcg/actuation nasal spray USE 2 SPRAY(S) IN EACH NOSTRIL ONCE DAILY AT BEDTIME   • furosemide (LASIX) 10 mg, oral, Daily   • levothyroxine (SYNTHROID, LEVOXYL) 50 mcg, oral, Daily   • losartan (COZAAR) 25 mg, oral, Daily   • meclizine (Antivert) 25 mg tablet    • metFORMIN (GLUCOPHAGE) 1,000 mg, oral   • pantoprazole (PROTONIX) 40 mg, oral, Daily   • pregabalin (LYRICA) 25 mg, oral, 2 times daily, Take 1 cap at night for one week and then 1 cap at night and 1 cap am   • sertraline (ZOLOFT) 100 mg, oral, Daily   • True Metrix Air Glucose Meter kit    • True Metrix Glucose Test Strip strip USE AS DIRECTED   • TRUEplus Lancets 33 gauge misc USE AS DIRECTED       Review of Systems   Constitutional: Positive for malaise/fatigue.   Cardiovascular:  Positive for dyspnea on exertion (occasional, mild).   Neurological:  Positive for dizziness (occasional).   All other systems reviewed and are negative.     Physical Exam:  Constitutional:       Appearance: Healthy appearance. Not in distress.   Neck:      Vascular: No JVR. JVD normal.   Pulmonary:      Effort: Pulmonary effort is normal.      Breath sounds: Normal breath sounds. No wheezing. No rhonchi. No rales.   Chest:      Chest wall: Not tender to palpatation.   Cardiovascular:      PMI at left midclavicular line. Normal rate. Regular rhythm. Normal S1. Normal S2.       Murmurs: There is no murmur.      No gallop.  No click. No rub.   Pulses:     Intact distal pulses.   Edema:     Peripheral edema absent.   Abdominal:      General: Bowel sounds are normal.      Palpations: Abdomen is soft.      Tenderness: There is no abdominal tenderness.   Musculoskeletal: Normal range of motion.         General: No tenderness. Skin:     General: Skin is warm and dry.   Neurological:      General: No focal deficit present.      Mental Status: Alert and oriented to person, place and time.          Last  Labs:  CBC -  Lab Results   Component Value Date    WBC 8.8 01/01/2024    HGB 11.0 (L) 01/01/2024    HCT 35.0 (L) 01/01/2024    MCV 87 01/01/2024     01/01/2024       CMP -  Lab Results   Component Value Date    CALCIUM 8.1 (L) 01/01/2024    PHOS 4.8 05/03/2022    PROT 6.7 01/01/2024    ALBUMIN 3.7 01/01/2024    AST 14 01/01/2024    ALT 10 01/01/2024    ALKPHOS 84 01/01/2024    BILITOT 0.4 01/01/2024       LIPID PANEL -   Lab Results   Component Value Date    CHOL 123 01/23/2024    TRIG 124 01/23/2024    HDL 50.9 01/23/2024    CHHDL 2.4 01/23/2024    LDLF 99 05/10/2023    VLDL 25 01/23/2024    NHDL 72 01/23/2024       RENAL FUNCTION PANEL -   Lab Results   Component Value Date    GLUCOSE 185 (H) 01/01/2024     01/01/2024    K 4.0 01/01/2024     01/01/2024    CO2 23 01/01/2024    ANIONGAP 12 01/01/2024    BUN 17 01/01/2024    CREATININE 1.14 (H) 01/01/2024    GFRMALE CANCELED 02/24/2023    CALCIUM 8.1 (L) 01/01/2024    PHOS 4.8 05/03/2022    ALBUMIN 3.7 01/01/2024        Lab Results   Component Value Date     (H) 01/01/2024    HGBA1C 7.4 (A) 05/10/2023       Last Cardiology Tests:  12/26/2023 - TTE  1. Left ventricular systolic function is low normal with a 50-55% estimated ejection fraction.  2. Spectral Doppler shows a pseudonormal pattern of left ventricular diastolic filling.  3. There are elevated left atrial and left ventricular end diastolic pressures.  4. There is low normal right ventricular systolic function.  5. Mildly elevated RVSP.     01/18/2023 - TTE  1. Left ventricular systolic function is moderately decreased with a 35% estimated ejection fraction.  2. Spectral Doppler shows an abnormal pattern of left ventricular diastolic filling.  3. There is moderately reduced right ventricular systolic function.  4. Mild to moderate mitral valve regurgitation.  5. Mildly elevated RVSP.  6. Moderate tricuspid regurgitation.  7. There is global hypokinesis of the left ventricle with  minor regional variations.    12/01/2022 - Cardiac Catheterization (LH/RH)  1. Triple vessel disease.  2. Patent LIMA to LAD and SVG to Cx OM.  3. Occluded SVG to RCA with non-hemodynamically sig disease of RCA.  4. Elevated LV filling pressures.  5. Left Ventricular end-diastolic pressure = 24.     11/28/2022 - TTE  1. Left ventricular systolic function is moderately decreased with a 35% estimated ejection fraction.  2. Spectral Doppler shows a restrictive pattern of left ventricular diastolic filling.  3. There is severely reduced right ventricular systolic function.  4. Mild to moderate tricuspid regurgitation.  5. Mildly elevated RVSP.  6. There is global hypokinesis of the left ventricle with minor regional variations.     11/26/2022 - CXR  Enlarged cardiac silhouette. Streaky right lower chest infiltrate or atelectasis. Possible small left pleural effusion.     08/22/2022 - TTE  Left ventricular systolic function is normal with a 60-65% estimated ejection fraction.     05/17/2022 - FREDDIE with DCC  1. The left ventricular systolic function is mildly to moderately decreased with a 45-50% estimated ejection fraction.  2. Successful direct current cardioversion for atrial fibrillation resulting in normal sinus rhythm.  3. The left atrium is mild to moderately dilated.  4. There is global hypokinesis of the left ventricle with minor regional variations.     04/25/2022 - TTE  1. The left ventricular systolic function is mildly to moderately decreased with a 40-45% estimated ejection fraction.  2. Abnormal septal motion consistent with post-operative status.  3. Spectral Doppler shows an impaired relaxation pattern of left ventricular diastolic filling.  4. Slightly elevated RVSP. The Doppler estimated RVSP is slightly elevated at 30.2 mmHg.  5. There is global hypokinesis of the left ventricle with minor regional variations.     04/21/2022 - Operative Report (Dr. JW Diop)  1. Coronary artery bypass grafting x4  with left internal mammary artery to left anterior descending artery, saphenous vein graft to obtuse marginal 1 distally and saphenous vein graft sequentially to right coronary artery and posterior descending artery.   2. Endoscopic vein harvesting.   3. Ligation of left atrial appendage with a 40 mm AtriCure clip, lot number [id number].   4. Posterior pericardiotomy.   5. Medistim flow probe analysis of bypass grafts.     04/06/2022 - Vascular Lab Carotid Artery Duplex U/S   1. Right Carotid: Findings are consistent with less than 50% stenosis of the right proximal ICA. Laminar flow seen by color Doppler. Right external carotid artery appears patent with no evidence of stenosis. No evidence of hemodynamically significant stenosis of the right common carotid artery. The right vertebral artery is patent with antegrade flow. No evidence of hemodynamically significant stenosis in the right subclavian.  2. Left Carotid: Findings are consistent with less than 50% stenosis of the left proximal ICA. Laminar flow seen by color Doppler. Left external carotid artery appears patent with no evidence of stenosis. No evidence of hemodynamically significant stenosis of the left common carotid artery. The left vertebral artery is patent with antegrade flow. No evidence of hemodynamically significant stenosis in the left subclavian.     03/04/2022 - Cardiac MRI  1. Normal LV size (EDVi 48 ml/m2) with moderate-severely reduced systolic function (LVEF 30%).  2. Global hypokinesis with severe hypokinesis of the basal lateral wall.  3. Subendocardial infarction of the basal lateral wall (<50% wall thickness). No evidence of myocardial infiltration.  4. All myocardial segments are deemed viable.     02/21/2022 - Cardiac Catheterization (LH/RH)  1. Triple vessel coronary artery disease with proximal left anterior descending involvement.  2. The 1st diagonal branch showed severe atherosclerotic disease and diffuse atherosclerotic disease.  3.  The 2nd diagonal branch demonstrated severe atherosclerotic disease and diffuse atherosclerotic disease.  4. The 1st obtuse marginal branch showed diffuse atherosclerotic disease.  5. The 2nd obtuse marginal branch demonstrated severe atherosclerotic disease.  6. The right posterior descending artery showed severe atherosclerotic disease.  7. Elevated LV filling pressures.  8. Left Ventricular end-diastolic pressure = 18.     02/19/2022 - TTE  1. The left ventricular systolic function is severely decreased with a 25-30% estimated ejection fraction.  2. Spectral Doppler shows a pseudonormal pattern of left ventricular diastolic filling.  3. There is global hypokinesis of the left ventricle with minor regional variations.       Assessment/Plan  1) CAD s/p CABG  On aspirin 81 mg daily, carvedilol 6.25 mg BID, atorvastatin 80 mg daily, Zetia 10 mg daily, furosemide 20 mg daily, losartan 25 mg daily   Cath with occluded SVG to RCA but other grafts are patent  TTE 12/26/2023 with LVEF 50-55%, elevated left atrial and left ventricular end diastolic pressures, low low normal RV systolic function, mildly elevated RVSP.  Denies CP or discomfort  Reports fatigue/tiredness  Reports occasional mild exertional SOB  Reports occasional dizziness  Check A1c, TSH, CBC, CMP  Followup with Tigist Calle NP, in 6 months    2) Postop A-Fib  Continue apixaban 5 mg BID, carvedilol 6.25 mg BID   [id number] score is 6  Discussed switching apixaban to warfarin s/t cost - patient declined   Maintaining NSR  Followup with Tigist Calle NP, in 6 months    3) Chronic Systolic Heart Failure  Continue carvedilol 6.25 mg BID, Jardiance 25 mg daily, furosemide 20 mg daily, losartan 25 mg   Entresto previously discontinued s/t cost   Lisinopril previously discontinued s/t cough  Established with [facility]  Reports SOB with minimal exertion and postural lightheadedness  Both metoprolol and carvedilol are on patient's medication list -  Advised to ensure she is not taking metoprolol   TTE 12/26/2023 with LVEF 50-55%, elevated left atrial and left ventricular end diastolic pressures, low low normal RV systolic function, mildly elevated RVSP.  Reports occasional mild exertional SOB  Followup with Tigist Calle NP, in 6 months     4) Dyslipidemia  Goal LDL less than 70  Continue atorvastatin 80 mg daily, ezetimibe 10 mg daily (started 06/2023)  Will avoid PCSK9 inhibitors at this time as patient is having difficulties affording medication   Lipid panel 01/23/2024 with LDL of 47; at goal  Followup with Tigist Calle NP, in 6 months      Scribe Attestation  By signing my name below, I, Jeannine Velazquez   attest that this documentation has been prepared under the direction and in the presence of Devyn Brooks MD.

## 2024-04-23 NOTE — TELEPHONE ENCOUNTER
4/23 4:00 pm - Patient returned Corie's call. Confirmed she is still taking diabetic meds and levothyroxine. Confirmed that she will contact Jesenia from Dr. Cummings's office to move up her appt in regards to elevated TSH and A1C. Stated Jesenia called her earlier today, she will attempt to call back. Corie COLE notified. - Fernando CHAVES

## 2024-04-23 NOTE — TELEPHONE ENCOUNTER
----- Message from Devyn Brooks MD sent at 4/23/2024 12:16 PM EDT -----  Need to follow up with Primary care physician for abnormal Thyroid tests and HbA1c

## 2024-04-23 NOTE — PROGRESS NOTES
Counseling:  The patient was counseled regarding diagnostic results, instructions for management, risk factor reductions, prognosis, patient and family education, impressions, risks and benefits of treatment options and importance of compliance with treatment.      Chief Complaint:   The patient presents today for 3-month followup of CAD, a-fib, heart failure, and dyslipidemia.      History Of Present Illness:    Victorina Adamson is a 74 year old female patient who presents today for 3-month followup of CAD, a-fib, heart failure, and dyslipidemia. Her PMH is significant for arthritis, CAD s/p CABG x4 (LIMA-LAD, SVG-OM1 distally and SVG sequentially-RCA and PDA) 04/21/2022, atrial fibrillation s/p DCC 05/17/2022, CHF, depression, DM, HTN, hyperlipidemia, hypothyroidism, SHARON and h/o shingles. Over the past 3 months, the patient states that she has done well from a cardiac standpoint. She denies any CP or chest discomfort. She reports occasional mild exertional SOB. She also reports fatigue/tiredness and occasional dizziness. EKG today shows that the patient is maintaining NSR. The patient is compliant with her prescribed medications.     Last Recorded Vitals:  Vitals:    04/23/24 0902   BP: 156/68   Pulse: 60   Weight: 86.6 kg (191 lb)       Past Surgical History:  She has a past surgical history that includes Other surgical history (06/17/2020); Other surgical history (06/17/2020); Other surgical history (06/17/2020); Other surgical history (05/19/2022); Other surgical history (05/26/2022); Other surgical history (06/01/2022); Other surgical history (03/15/2022); Other surgical history (02/11/2021); Other surgical history (10/18/2022); Other surgical history (09/20/2021); and IR injection joint (10/13/2021).      Social History:  She reports that she has never smoked. She has never used smokeless tobacco. She reports current alcohol use. She reports that she does not use drugs.    Family History:  Family History    Problem Relation Name Age of Onset    Hypertension Mother          benign essential    Arthritis Mother      Coronary artery disease Mother      Depression Mother      Diabetes Mother      Other (substance abuse) Mother      Lung cancer Father      Other (substance abuse) Father      Diabetes Sister      Hypertension Brother          benign essential    Lung cancer Brother          Allergies:  Gabapentin, Hydrocodone-acetaminophen, Influenza virus vaccines, Lisinopril, and Oxycodone    Outpatient Medications:  Current Outpatient Medications   Medication Instructions    acetaminophen (TYLENOL) 1,000 mg, oral, Every 6 hours PRN    albuterol 90 mcg/actuation inhaler 2 puffs, inhalation, Every 4 hours PRN    apixaban (Eliquis) 5 mg tablet TAKE 1 TABLET BY MOUTH TWO TIMES A DAY    aspirin 81 mg, oral, Daily    atorvastatin (LIPITOR) 80 mg, oral, Nightly    blood glucose control, low (True Metrix Level 1) solution 1 each, Topical, See admin instructions    blood-glucose meter (True Metrix Air Glucose Meter) misc 1 each, Topical, See admin instructions    carvedilol (COREG) 6.25 mg, oral, 2 times daily with meals    DropSafe Alcohol Prep Pads pads, medicated USE AS DIRECTED    DULoxetine (CYMBALTA) 60 mg, oral, Daily    empagliflozin (JARDIANCE) 25 mg, oral, Daily    ezetimibe (ZETIA) 10 mg, oral, Daily    ferrous sulfate 65 mg, oral, 3 times daily with meals, Do not crush, chew, or split.    fluticasone (Flonase) 50 mcg/actuation nasal spray USE 2 SPRAY(S) IN EACH NOSTRIL ONCE DAILY AT BEDTIME    furosemide (LASIX) 10 mg, oral, Daily    levothyroxine (SYNTHROID, LEVOXYL) 50 mcg, oral, Daily    losartan (COZAAR) 25 mg, oral, Daily    meclizine (Antivert) 25 mg tablet     metFORMIN (GLUCOPHAGE) 1,000 mg, oral    pantoprazole (PROTONIX) 40 mg, oral, Daily    pregabalin (LYRICA) 25 mg, oral, 2 times daily, Take 1 cap at night for one week and then 1 cap at night and 1 cap am    sertraline (ZOLOFT) 100 mg, oral, Daily     True Metrix Air Glucose Meter kit     True Metrix Glucose Test Strip strip USE AS DIRECTED    TRUEplus Lancets 33 gauge misc USE AS DIRECTED       Review of Systems   Constitutional: Positive for malaise/fatigue.   Cardiovascular:  Positive for dyspnea on exertion (occasional, mild).   Neurological:  Positive for dizziness (occasional).   All other systems reviewed and are negative.     Physical Exam:  Constitutional:       Appearance: Healthy appearance. Not in distress.   Neck:      Vascular: No JVR. JVD normal.   Pulmonary:      Effort: Pulmonary effort is normal.      Breath sounds: Normal breath sounds. No wheezing. No rhonchi. No rales.   Chest:      Chest wall: Not tender to palpatation.   Cardiovascular:      PMI at left midclavicular line. Normal rate. Regular rhythm. Normal S1. Normal S2.       Murmurs: There is no murmur.      No gallop.  No click. No rub.   Pulses:     Intact distal pulses.   Edema:     Peripheral edema absent.   Abdominal:      General: Bowel sounds are normal.      Palpations: Abdomen is soft.      Tenderness: There is no abdominal tenderness.   Musculoskeletal: Normal range of motion.         General: No tenderness. Skin:     General: Skin is warm and dry.   Neurological:      General: No focal deficit present.      Mental Status: Alert and oriented to person, place and time.          Last Labs:  CBC -  Lab Results   Component Value Date    WBC 8.8 01/01/2024    HGB 11.0 (L) 01/01/2024    HCT 35.0 (L) 01/01/2024    MCV 87 01/01/2024     01/01/2024       CMP -  Lab Results   Component Value Date    CALCIUM 8.1 (L) 01/01/2024    PHOS 4.8 05/03/2022    PROT 6.7 01/01/2024    ALBUMIN 3.7 01/01/2024    AST 14 01/01/2024    ALT 10 01/01/2024    ALKPHOS 84 01/01/2024    BILITOT 0.4 01/01/2024       LIPID PANEL -   Lab Results   Component Value Date    CHOL 123 01/23/2024    TRIG 124 01/23/2024    HDL 50.9 01/23/2024    CHHDL 2.4 01/23/2024    LDLF 99 05/10/2023    VLDL 25 01/23/2024     NHDL 72 01/23/2024       RENAL FUNCTION PANEL -   Lab Results   Component Value Date    GLUCOSE 185 (H) 01/01/2024     01/01/2024    K 4.0 01/01/2024     01/01/2024    CO2 23 01/01/2024    ANIONGAP 12 01/01/2024    BUN 17 01/01/2024    CREATININE 1.14 (H) 01/01/2024    GFRMALE CANCELED 02/24/2023    CALCIUM 8.1 (L) 01/01/2024    PHOS 4.8 05/03/2022    ALBUMIN 3.7 01/01/2024        Lab Results   Component Value Date     (H) 01/01/2024    HGBA1C 7.4 (A) 05/10/2023       Last Cardiology Tests:  12/26/2023 - TTE  1. Left ventricular systolic function is low normal with a 50-55% estimated ejection fraction.  2. Spectral Doppler shows a pseudonormal pattern of left ventricular diastolic filling.  3. There are elevated left atrial and left ventricular end diastolic pressures.  4. There is low normal right ventricular systolic function.  5. Mildly elevated RVSP.     01/18/2023 - TTE  1. Left ventricular systolic function is moderately decreased with a 35% estimated ejection fraction.  2. Spectral Doppler shows an abnormal pattern of left ventricular diastolic filling.  3. There is moderately reduced right ventricular systolic function.  4. Mild to moderate mitral valve regurgitation.  5. Mildly elevated RVSP.  6. Moderate tricuspid regurgitation.  7. There is global hypokinesis of the left ventricle with minor regional variations.    12/01/2022 - Cardiac Catheterization (LH/RH)  1. Triple vessel disease.  2. Patent LIMA to LAD and SVG to Cx OM.  3. Occluded SVG to RCA with non-hemodynamically sig disease of RCA.  4. Elevated LV filling pressures.  5. Left Ventricular end-diastolic pressure = 24.     11/28/2022 - TTE  1. Left ventricular systolic function is moderately decreased with a 35% estimated ejection fraction.  2. Spectral Doppler shows a restrictive pattern of left ventricular diastolic filling.  3. There is severely reduced right ventricular systolic function.  4. Mild to moderate tricuspid  regurgitation.  5. Mildly elevated RVSP.  6. There is global hypokinesis of the left ventricle with minor regional variations.     11/26/2022 - CXR  Enlarged cardiac silhouette. Streaky right lower chest infiltrate or atelectasis. Possible small left pleural effusion.     08/22/2022 - TTE  Left ventricular systolic function is normal with a 60-65% estimated ejection fraction.     05/17/2022 - FREDDIE with DCC  1. The left ventricular systolic function is mildly to moderately decreased with a 45-50% estimated ejection fraction.  2. Successful direct current cardioversion for atrial fibrillation resulting in normal sinus rhythm.  3. The left atrium is mild to moderately dilated.  4. There is global hypokinesis of the left ventricle with minor regional variations.     04/25/2022 - TTE  1. The left ventricular systolic function is mildly to moderately decreased with a 40-45% estimated ejection fraction.  2. Abnormal septal motion consistent with post-operative status.  3. Spectral Doppler shows an impaired relaxation pattern of left ventricular diastolic filling.  4. Slightly elevated RVSP. The Doppler estimated RVSP is slightly elevated at 30.2 mmHg.  5. There is global hypokinesis of the left ventricle with minor regional variations.     04/21/2022 - Operative Report (Dr. JW Diop)  1. Coronary artery bypass grafting x4 with left internal mammary artery to left anterior descending artery, saphenous vein graft to obtuse marginal 1 distally and saphenous vein graft sequentially to right coronary artery and posterior descending artery.   2. Endoscopic vein harvesting.   3. Ligation of left atrial appendage with a 40 mm AtriCure clip, lot number [id number].   4. Posterior pericardiotomy.   5. Medistim flow probe analysis of bypass grafts.     04/06/2022 - Vascular Lab Carotid Artery Duplex U/S   1. Right Carotid: Findings are consistent with less than 50% stenosis of the right proximal ICA. Laminar flow seen by color Doppler.  Right external carotid artery appears patent with no evidence of stenosis. No evidence of hemodynamically significant stenosis of the right common carotid artery. The right vertebral artery is patent with antegrade flow. No evidence of hemodynamically significant stenosis in the right subclavian.  2. Left Carotid: Findings are consistent with less than 50% stenosis of the left proximal ICA. Laminar flow seen by color Doppler. Left external carotid artery appears patent with no evidence of stenosis. No evidence of hemodynamically significant stenosis of the left common carotid artery. The left vertebral artery is patent with antegrade flow. No evidence of hemodynamically significant stenosis in the left subclavian.     03/04/2022 - Cardiac MRI  1. Normal LV size (EDVi 48 ml/m2) with moderate-severely reduced systolic function (LVEF 30%).  2. Global hypokinesis with severe hypokinesis of the basal lateral wall.  3. Subendocardial infarction of the basal lateral wall (<50% wall thickness). No evidence of myocardial infiltration.  4. All myocardial segments are deemed viable.     02/21/2022 - Cardiac Catheterization (LH/RH)  1. Triple vessel coronary artery disease with proximal left anterior descending involvement.  2. The 1st diagonal branch showed severe atherosclerotic disease and diffuse atherosclerotic disease.  3. The 2nd diagonal branch demonstrated severe atherosclerotic disease and diffuse atherosclerotic disease.  4. The 1st obtuse marginal branch showed diffuse atherosclerotic disease.  5. The 2nd obtuse marginal branch demonstrated severe atherosclerotic disease.  6. The right posterior descending artery showed severe atherosclerotic disease.  7. Elevated LV filling pressures.  8. Left Ventricular end-diastolic pressure = 18.     02/19/2022 - TTE  1. The left ventricular systolic function is severely decreased with a 25-30% estimated ejection fraction.  2. Spectral Doppler shows a pseudonormal pattern of  left ventricular diastolic filling.  3. There is global hypokinesis of the left ventricle with minor regional variations.       Assessment/Plan   1) CAD s/p CABG  On aspirin 81 mg daily, carvedilol 6.25 mg BID, atorvastatin 80 mg daily, Zetia 10 mg daily, furosemide 20 mg daily, losartan 25 mg daily   Cath with occluded SVG to RCA but other grafts are patent  TTE 12/26/2023 with LVEF 50-55%, elevated left atrial and left ventricular end diastolic pressures, low low normal RV systolic function, mildly elevated RVSP.  Denies CP or discomfort  Reports fatigue/tiredness  Reports occasional mild exertional SOB  Reports occasional dizziness  Check A1c, TSH, CBC, CMP  Followup with Tigist Calle NP, in 6 months    2) Postop A-Fib  Continue apixaban 5 mg BID, carvedilol 6.25 mg BID   [id number] score is 6  Discussed switching apixaban to warfarin s/t cost - patient declined   Maintaining NSR  Followup with Tigist Calle NP, in 6 months    3) Chronic Systolic Heart Failure  Continue carvedilol 6.25 mg BID, Jardiance 25 mg daily, furosemide 20 mg daily, losartan 25 mg   Entresto previously discontinued s/t cost   Lisinopril previously discontinued s/t cough  Established with [facility]  Reports SOB with minimal exertion and postural lightheadedness  Both metoprolol and carvedilol are on patient's medication list - Advised to ensure she is not taking metoprolol   TTE 12/26/2023 with LVEF 50-55%, elevated left atrial and left ventricular end diastolic pressures, low low normal RV systolic function, mildly elevated RVSP.  Reports occasional mild exertional SOB  Followup with Tigist Calle NP, in 6 months     4) Dyslipidemia  Goal LDL less than 70  Continue atorvastatin 80 mg daily, ezetimibe 10 mg daily (started 06/2023)  Will avoid PCSK9 inhibitors at this time as patient is having difficulties affording medication   Lipid panel 01/23/2024 with LDL of 47; at goal  Followup with Tigist Calle NP, in 6  months      Scribe Attestation  By signing my name below, I, Jeannine Velazquez   attest that this documentation has been prepared under the direction and in the presence of Devyn Brooks MD.

## 2024-04-24 ENCOUNTER — TELEPHONE (OUTPATIENT)
Dept: CARDIOLOGY | Facility: CLINIC | Age: 74
End: 2024-04-24
Payer: MEDICARE

## 2024-04-24 NOTE — TELEPHONE ENCOUNTER
----- Message from Damaris Baron RN sent at 4/23/2024  1:06 PM EDT -----  Her HgbA1c and TSH were elevated.  Dr. Brooks would like her to follow up with PCP for those.  We called the PCP office and asked to have her appointment moved up if possible.  Not urgent but July is a little far.  They should be calling her to get her in sooner.  ----- Message -----  From: Damaris Baron RN  Sent: 4/23/2024  12:12 PM EDT  To: JOSY Muniz Dr. reviewed your lab results.  Other than elevated blood sugar, labs are ok.  Keep working with your PCP to manage your diabetes.  No changes to cardiac treatment plan at this time.  Follow up as scheduled.     ----- Message -----  From: Devyn Brooks MD  Sent: 4/23/2024  11:12 AM EDT  To: [id number] Clinical Support Staff    Labs ok

## 2024-05-28 ENCOUNTER — APPOINTMENT (OUTPATIENT)
Dept: PRIMARY CARE | Facility: CLINIC | Age: 74
End: 2024-05-28
Payer: MEDICARE

## 2024-05-30 ENCOUNTER — OFFICE VISIT (OUTPATIENT)
Dept: PRIMARY CARE | Facility: CLINIC | Age: 74
End: 2024-05-30
Payer: MEDICARE

## 2024-05-30 VITALS
RESPIRATION RATE: 18 BRPM | DIASTOLIC BLOOD PRESSURE: 70 MMHG | WEIGHT: 194 LBS | HEART RATE: 67 BPM | SYSTOLIC BLOOD PRESSURE: 124 MMHG | HEIGHT: 62 IN | OXYGEN SATURATION: 98 % | BODY MASS INDEX: 35.7 KG/M2

## 2024-05-30 DIAGNOSIS — I48.20 CHRONIC ATRIAL FIBRILLATION (MULTI): ICD-10-CM

## 2024-05-30 DIAGNOSIS — E11.22 TYPE 2 DIABETES MELLITUS WITH STAGE 3A CHRONIC KIDNEY DISEASE, WITHOUT LONG-TERM CURRENT USE OF INSULIN (MULTI): ICD-10-CM

## 2024-05-30 DIAGNOSIS — E03.8 OTHER SPECIFIED HYPOTHYROIDISM: ICD-10-CM

## 2024-05-30 DIAGNOSIS — I25.718 ATHEROSCLEROSIS OF AUTOLOGOUS VEIN CORONARY ARTERY BYPASS GRAFT(S) WITH OTHER FORMS OF ANGINA PECTORIS (CMS-HCC): ICD-10-CM

## 2024-05-30 DIAGNOSIS — H61.23 CERUMEN DEBRIS ON TYMPANIC MEMBRANE, BILATERAL: ICD-10-CM

## 2024-05-30 DIAGNOSIS — N18.31 TYPE 2 DIABETES MELLITUS WITH STAGE 3A CHRONIC KIDNEY DISEASE, WITHOUT LONG-TERM CURRENT USE OF INSULIN (MULTI): ICD-10-CM

## 2024-05-30 DIAGNOSIS — E66.01 OBESITY, MORBID (MULTI): ICD-10-CM

## 2024-05-30 DIAGNOSIS — B37.31 CANDIDIASIS OF VAGINA: ICD-10-CM

## 2024-05-30 DIAGNOSIS — E11.69 TYPE 2 DIABETES MELLITUS WITH OTHER SPECIFIED COMPLICATION, WITHOUT LONG-TERM CURRENT USE OF INSULIN (MULTI): ICD-10-CM

## 2024-05-30 DIAGNOSIS — F33.9 RECURRENT MAJOR DEPRESSIVE DISORDER, REMISSION STATUS UNSPECIFIED (CMS-HCC): ICD-10-CM

## 2024-05-30 DIAGNOSIS — B37.31 VAGINAL CANDIDIASIS: ICD-10-CM

## 2024-05-30 DIAGNOSIS — R42 VERTIGO: ICD-10-CM

## 2024-05-30 DIAGNOSIS — J01.00 ACUTE NON-RECURRENT MAXILLARY SINUSITIS: Primary | ICD-10-CM

## 2024-05-30 DIAGNOSIS — D68.69 OTHER THROMBOPHILIA (MULTI): ICD-10-CM

## 2024-05-30 DIAGNOSIS — I50.23 ACUTE ON CHRONIC SYSTOLIC (CONGESTIVE) HEART FAILURE (MULTI): ICD-10-CM

## 2024-05-30 PROCEDURE — 4010F ACE/ARB THERAPY RXD/TAKEN: CPT

## 2024-05-30 PROCEDURE — 1159F MED LIST DOCD IN RCRD: CPT

## 2024-05-30 PROCEDURE — 3048F LDL-C <100 MG/DL: CPT

## 2024-05-30 PROCEDURE — 1160F RVW MEDS BY RX/DR IN RCRD: CPT

## 2024-05-30 PROCEDURE — 3046F HEMOGLOBIN A1C LEVEL >9.0%: CPT

## 2024-05-30 PROCEDURE — 1036F TOBACCO NON-USER: CPT

## 2024-05-30 PROCEDURE — 1126F AMNT PAIN NOTED NONE PRSNT: CPT

## 2024-05-30 PROCEDURE — 3078F DIAST BP <80 MM HG: CPT

## 2024-05-30 PROCEDURE — 1157F ADVNC CARE PLAN IN RCRD: CPT

## 2024-05-30 PROCEDURE — 99214 OFFICE O/P EST MOD 30 MIN: CPT

## 2024-05-30 PROCEDURE — 3074F SYST BP LT 130 MM HG: CPT

## 2024-05-30 RX ORDER — LEVOTHYROXINE SODIUM 50 UG/1
50 TABLET ORAL DAILY
Qty: 90 TABLET | Refills: 3 | Status: SHIPPED | OUTPATIENT
Start: 2024-05-30 | End: 2025-05-30

## 2024-05-30 RX ORDER — FLUCONAZOLE 150 MG/1
150 TABLET ORAL ONCE
Qty: 1 TABLET | Refills: 0 | Status: SHIPPED | OUTPATIENT
Start: 2024-05-30 | End: 2024-05-30

## 2024-05-30 RX ORDER — GLIMEPIRIDE 1 MG/1
1 TABLET ORAL
Qty: 100 TABLET | Refills: 3 | Status: SHIPPED | OUTPATIENT
Start: 2024-05-30 | End: 2024-05-30

## 2024-05-30 RX ORDER — MECLIZINE HYDROCHLORIDE 25 MG/1
TABLET ORAL
Qty: 30 TABLET | Status: CANCELLED | OUTPATIENT
Start: 2024-05-30

## 2024-05-30 RX ORDER — MECLIZINE HCL 12.5 MG 12.5 MG/1
25 TABLET ORAL 3 TIMES DAILY PRN
Qty: 90 TABLET | Refills: 11 | Status: SHIPPED | OUTPATIENT
Start: 2024-05-30 | End: 2025-05-30

## 2024-05-30 RX ORDER — GLIMEPIRIDE 1 MG/1
2 TABLET ORAL
Qty: 100 TABLET | Refills: 3 | Status: SHIPPED | OUTPATIENT
Start: 2024-05-30 | End: 2025-07-04

## 2024-05-30 ASSESSMENT — ENCOUNTER SYMPTOMS
CARDIOVASCULAR NEGATIVE: 1
GASTROINTESTINAL NEGATIVE: 1
ENDOCRINE NEGATIVE: 1
HEMATOLOGIC/LYMPHATIC NEGATIVE: 1
DEPRESSION: 1
PSYCHIATRIC NEGATIVE: 1
ALLERGIC/IMMUNOLOGIC NEGATIVE: 1
CONSTITUTIONAL NEGATIVE: 1
NEUROLOGICAL NEGATIVE: 1
EYES NEGATIVE: 1
LOSS OF SENSATION IN FEET: 1
RESPIRATORY NEGATIVE: 1
OCCASIONAL FEELINGS OF UNSTEADINESS: 1
MUSCULOSKELETAL NEGATIVE: 1

## 2024-05-30 ASSESSMENT — COLUMBIA-SUICIDE SEVERITY RATING SCALE - C-SSRS
1. IN THE PAST MONTH, HAVE YOU WISHED YOU WERE DEAD OR WISHED YOU COULD GO TO SLEEP AND NOT WAKE UP?: NO
2. HAVE YOU ACTUALLY HAD ANY THOUGHTS OF KILLING YOURSELF?: NO
6. HAVE YOU EVER DONE ANYTHING, STARTED TO DO ANYTHING, OR PREPARED TO DO ANYTHING TO END YOUR LIFE?: NO

## 2024-05-30 ASSESSMENT — PATIENT HEALTH QUESTIONNAIRE - PHQ9
1. LITTLE INTEREST OR PLEASURE IN DOING THINGS: NOT AT ALL
SUM OF ALL RESPONSES TO PHQ9 QUESTIONS 1 AND 2: 0
2. FEELING DOWN, DEPRESSED OR HOPELESS: NOT AT ALL

## 2024-05-30 ASSESSMENT — PAIN SCALES - GENERAL: PAINLEVEL: 0-NO PAIN

## 2024-05-30 NOTE — PROGRESS NOTES
Subjective   Patient ID: Victorina Adamson is a 74 y.o. female who presents for Follow-up, Vaginal Itching (Awhile ASKING FOR SCRIPT FOR DIFLUCAN), and Hyperthyroidism (LABS HIGH PATIENT HAS NOT BEEN TAKING MEDICATION).    Past Medical, Surgical, and Family History reviewed and updated in chart.     Reviewed all medications by prescribing practitioner or clinical pharmacist (such as prescriptions, OTCs, herbal therapies and supplements) and documented in the medical record.    Vaginal Itching  Patient in office with complaints of vaginal itching and for refills. Has not been taking levothyroxine, TSH was 10.10.  Has been having vaginal itching and has been taking over the counter vaginal itching pills that have been unsuccessful.   A1c was elevated at 9.7 up from 7.4 a year ago. States has been taking medication as prescribed.  Could do better with diet, states eats a lot of fruits and chocolate cake. 2-3 small meals. Does not exercise.       Concerns were made due to discussion with patient about home situations.  Patient states she feels safe at home the son is dating who lives with them does not help cook, clean. States talks to herself, paces in house.  States her son has tried to get girl in [place] but has not been successful.  This is her house that she rents. She does not feel unsafe, gets nervous when she paces around the house and acts weird.  Has both sons that live with her. She thinks that she plans on moving, her son is trying to get her a car.  She does not like she has to feed and take care of her grandkids.  She states her boys take well care of her and she does not feel unsafe with them in the house.    Review of Systems   Constitutional: Negative.    HENT: Negative.     Eyes: Negative.    Respiratory: Negative.     Cardiovascular: Negative.    Gastrointestinal: Negative.    Endocrine: Negative.    Genitourinary: Negative.         Vaginal itching   Musculoskeletal: Negative.    Skin: Negative.   "  Allergic/Immunologic: Negative.    Neurological: Negative.    Hematological: Negative.    Psychiatric/Behavioral: Negative.     All other systems reviewed and are negative.      Objective   /70 (BP Location: Right arm, Patient Position: Sitting, BP Cuff Size: Adult)   Pulse 67   Resp 18   Ht 1.575 m (5' 2\")   Wt 88 kg (194 lb)   SpO2 98%   BMI 35.48 kg/m²     Physical Exam  Constitutional:       Appearance: Normal appearance.   HENT:      Head: Normocephalic and atraumatic.      Right Ear: There is impacted cerumen.      Left Ear: There is impacted cerumen.      Nose: Nose normal.      Mouth/Throat:      Mouth: Mucous membranes are moist.      Pharynx: Oropharynx is clear.   Eyes:      Pupils: Pupils are equal, round, and reactive to light.   Cardiovascular:      Rate and Rhythm: Normal rate and regular rhythm.      Pulses: Normal pulses.      Heart sounds: Normal heart sounds.   Pulmonary:      Effort: Pulmonary effort is normal.      Breath sounds: Normal breath sounds.   Abdominal:      General: Bowel sounds are normal.      Palpations: Abdomen is soft.   Musculoskeletal:         General: Normal range of motion.      Cervical back: Normal range of motion.   Skin:     General: Skin is warm and dry.   Neurological:      General: No focal deficit present.      Mental Status: She is alert and oriented to person, place, and time.   Psychiatric:         Mood and Affect: Mood normal.         Behavior: Behavior normal.         Thought Content: Thought content normal.         Judgment: Judgment normal.       Assessment/Plan   Problem List Items Addressed This Visit       Type 2 diabetes mellitus with other specified complication (Multi)    Relevant Medications    glimepiride (AmaryL) 1 mg tablet    Other Relevant Orders    Follow Up In [facility] - PCP - Health Maintenance    Hypothyroidism (Chronic)    Relevant Medications    levothyroxine (Synthroid, Levoxyl) 50 mcg tablet    Candidiasis of " vagina     Other Visit Diagnoses       Acute non-recurrent maxillary sinusitis    -  Primary    Vaginal candidiasis        Relevant Medications    fluconazole (Diflucan) 150 mg tablet    Yeast infection        Vertigo        Relevant Medications    meclizine (Antivert) 12.5 mg tablet    Cerumen debris on tympanic membrane, bilateral        Relevant Medications    carbamide peroxide (Debrox) 6.5 % otic solution          It is recommend that you consume a balanced diet to include: fruits, a variety of vegetables (dark green, red and orange, legumes like beans and peas, starch, other), grains (at least half of which are whole grains), fat-free or low-fat dairy including milk,cheese, or fortified soy beverages, and proteins such as lean means, poultry, fish, eggs, nuts, seeds.   Limit processed foods, saturated and trans fats, added sugars and sodium (salt).   Continue to check blood sugars 3-4 times daily.  We need to get your A1c levels down, I added a new medication and we will recheck levels in 3 months,.      The patient received Provided instructions on exercise  Advised to Increase physical activity because they have an above normal BMI.  Patient was identified as a fall risk. Risk prevention instructions provided.

## 2024-05-30 NOTE — PATIENT INSTRUCTIONS
It is recommend that you consume a balanced diet to include: fruits, a variety of vegetables (dark green, red and orange, legumes like beans and peas, starch, other), grains (at least half of which are whole grains), fat-free or low-fat dairy including milk,cheese, or fortified soy beverages, and proteins such as lean means, poultry, fish, eggs, nuts, seeds.   Limit processed foods, saturated and trans fats, added sugars and sodium (salt).   Continue to check blood sugars 3-4 times daily.  We need to get your A1c levels down, I added a new medication and we will recheck levels in 3 months,.     Assessment/Plan   Problem List Items Addressed This Visit       Type 2 diabetes mellitus with other specified complication (Multi)    Relevant Medications    glimepiride (AmaryL) 1 mg tablet    Other Relevant Orders    Follow Up In [facility] - PCP - Health Maintenance    Hypothyroidism (Chronic)    Relevant Medications    levothyroxine (Synthroid, Levoxyl) 50 mcg tablet    Candidiasis of vagina     Other Visit Diagnoses       Acute non-recurrent maxillary sinusitis    -  Primary    Vaginal candidiasis        Relevant Medications    fluconazole (Diflucan) 150 mg tablet    Yeast infection        Vertigo        Relevant Medications    meclizine (Antivert) 12.5 mg tablet    Cerumen debris on tympanic membrane, bilateral        Relevant Medications    carbamide peroxide (Debrox) 6.5 % otic solution                     Ways to Help Prevent Falls at Home    Quick Tips   ? Ask for help if you need it. Most people want to help!   ? Get up slowly after sitting or laying down   ? Wear a medical alert device or keep cell phone in your pocket   ? Use night lights, especially areas near a bathroom   ? Keep the items you use often within reach on a small stool or end table   ? Use an assistive device such as walker or cane, as directed by provider/physical therapy   ? Use a non-slip mat and grab bars in your bathroom. Look for  home health sections for best options     Other Areas to Focus On   ? Exercise and nutrition: Regular exercise or taking a falls prevention class are great ways improve strength and balance. Don’t forget to stay hydrated and bring a snack!   ? Medicine side effects: Some medicines can make you sleepy or dizzy, which could cause a fall. Ask your healthcare provider about the side effects your medicines could cause. Be sure to let them know if you take any vitamins or supplements as well.   ? Tripping hazards: Remove items you could trip on, such as loose mats, rugs, cords, and clutter. Wear closed toe shoes with rubber soles.   ? Health and wellness: Get regular checkups with your healthcare provider, plus routine vision and hearing screenings. Talk with your healthcare provider about:   o Your medicines and the possible side effects - bring them in a bag if that is easier!   o Problems with balance or feeling dizzy   o Ways to promote bone health, such as Vitamin D and calcium supplements   o Questions or concerns about falling     *Ask your healthcare team if you have questions     ©[facility], 2022    0 = swallows foods/liquids without difficulty

## 2024-07-18 DIAGNOSIS — Z95.1 S/P CABG X 4: ICD-10-CM

## 2024-07-18 DIAGNOSIS — E78.5 DYSLIPIDEMIA: ICD-10-CM

## 2024-07-18 DIAGNOSIS — I25.10 ARTERIOSCLEROSIS OF CORONARY ARTERY: ICD-10-CM

## 2024-07-19 RX ORDER — ATORVASTATIN CALCIUM 80 MG/1
80 TABLET, FILM COATED ORAL NIGHTLY
Qty: 90 TABLET | Refills: 3 | Status: SHIPPED | OUTPATIENT
Start: 2024-07-19

## 2024-07-25 ENCOUNTER — APPOINTMENT (OUTPATIENT)
Dept: PRIMARY CARE | Facility: CLINIC | Age: 74
End: 2024-07-25
Payer: MEDICARE

## 2024-08-21 DIAGNOSIS — I50.22 CHRONIC SYSTOLIC HEART FAILURE (MULTI): ICD-10-CM

## 2024-08-21 RX ORDER — LOSARTAN POTASSIUM 25 MG/1
25 TABLET ORAL DAILY
Qty: 90 TABLET | Refills: 0 | OUTPATIENT
Start: 2024-08-21

## 2024-08-22 RX ORDER — LOSARTAN POTASSIUM 25 MG/1
25 TABLET ORAL DAILY
Qty: 90 TABLET | Refills: 1 | Status: SHIPPED | OUTPATIENT
Start: 2024-08-22 | End: 2025-02-18

## 2024-08-30 ENCOUNTER — APPOINTMENT (OUTPATIENT)
Dept: PRIMARY CARE | Facility: CLINIC | Age: 74
End: 2024-08-30
Payer: MEDICARE

## 2024-08-30 VITALS
HEIGHT: 62 IN | WEIGHT: 196 LBS | OXYGEN SATURATION: 98 % | HEART RATE: 84 BPM | BODY MASS INDEX: 36.07 KG/M2 | DIASTOLIC BLOOD PRESSURE: 64 MMHG | RESPIRATION RATE: 16 BRPM | SYSTOLIC BLOOD PRESSURE: 138 MMHG

## 2024-08-30 DIAGNOSIS — Z00.00 ROUTINE GENERAL MEDICAL EXAMINATION AT HEALTH CARE FACILITY: Primary | ICD-10-CM

## 2024-08-30 DIAGNOSIS — Z51.81 THERAPEUTIC DRUG MONITORING: ICD-10-CM

## 2024-08-30 DIAGNOSIS — Z00.00 MEDICARE ANNUAL WELLNESS VISIT, SUBSEQUENT: ICD-10-CM

## 2024-08-30 DIAGNOSIS — E11.69 TYPE 2 DIABETES MELLITUS WITH OTHER SPECIFIED COMPLICATION, WITHOUT LONG-TERM CURRENT USE OF INSULIN (MULTI): ICD-10-CM

## 2024-08-30 DIAGNOSIS — E55.9 VITAMIN D DEFICIENCY: ICD-10-CM

## 2024-08-30 DIAGNOSIS — M54.16 RIGHT LUMBAR RADICULITIS: ICD-10-CM

## 2024-08-30 DIAGNOSIS — M47.816 LUMBAR SPONDYLOSIS: ICD-10-CM

## 2024-08-30 DIAGNOSIS — I10 ESSENTIAL HYPERTENSION: ICD-10-CM

## 2024-08-30 DIAGNOSIS — E11.69 DM TYPE 2 WITH DIABETIC DYSLIPIDEMIA (MULTI): Chronic | ICD-10-CM

## 2024-08-30 DIAGNOSIS — I42.9 CARDIOMYOPATHY, UNSPECIFIED TYPE (MULTI): ICD-10-CM

## 2024-08-30 DIAGNOSIS — J44.1 ACUTE EXACERBATION OF CHRONIC OBSTRUCTIVE PULMONARY DISEASE (MULTI): ICD-10-CM

## 2024-08-30 DIAGNOSIS — E78.5 DM TYPE 2 WITH DIABETIC DYSLIPIDEMIA (MULTI): Chronic | ICD-10-CM

## 2024-08-30 DIAGNOSIS — F33.42 RECURRENT MAJOR DEPRESSIVE DISORDER, IN FULL REMISSION (CMS-HCC): ICD-10-CM

## 2024-08-30 DIAGNOSIS — E66.01 OBESITY, MORBID (MULTI): ICD-10-CM

## 2024-08-30 DIAGNOSIS — E78.2 MIXED HYPERLIPIDEMIA: ICD-10-CM

## 2024-08-30 DIAGNOSIS — I48.91 ATRIAL FIBRILLATION, UNSPECIFIED TYPE (MULTI): Chronic | ICD-10-CM

## 2024-08-30 DIAGNOSIS — E03.8 OTHER SPECIFIED HYPOTHYROIDISM: Chronic | ICD-10-CM

## 2024-08-30 LAB — POC HEMOGLOBIN A1C: 9.2 % (ref 4.2–6.5)

## 2024-08-30 PROCEDURE — 1157F ADVNC CARE PLAN IN RCRD: CPT

## 2024-08-30 PROCEDURE — 3048F LDL-C <100 MG/DL: CPT

## 2024-08-30 PROCEDURE — 1123F ACP DISCUSS/DSCN MKR DOCD: CPT

## 2024-08-30 PROCEDURE — 3046F HEMOGLOBIN A1C LEVEL >9.0%: CPT

## 2024-08-30 PROCEDURE — 99214 OFFICE O/P EST MOD 30 MIN: CPT

## 2024-08-30 PROCEDURE — 1170F FXNL STATUS ASSESSED: CPT

## 2024-08-30 PROCEDURE — 3078F DIAST BP <80 MM HG: CPT

## 2024-08-30 PROCEDURE — 1159F MED LIST DOCD IN RCRD: CPT

## 2024-08-30 PROCEDURE — 4010F ACE/ARB THERAPY RXD/TAKEN: CPT

## 2024-08-30 PROCEDURE — 80307 DRUG TEST PRSMV CHEM ANLYZR: CPT

## 2024-08-30 PROCEDURE — 1125F AMNT PAIN NOTED PAIN PRSNT: CPT

## 2024-08-30 PROCEDURE — 3075F SYST BP GE 130 - 139MM HG: CPT

## 2024-08-30 PROCEDURE — 1158F ADVNC CARE PLAN TLK DOCD: CPT

## 2024-08-30 PROCEDURE — 1160F RVW MEDS BY RX/DR IN RCRD: CPT

## 2024-08-30 PROCEDURE — 83036 HEMOGLOBIN GLYCOSYLATED A1C: CPT

## 2024-08-30 PROCEDURE — G0439 PPPS, SUBSEQ VISIT: HCPCS

## 2024-08-30 PROCEDURE — 1036F TOBACCO NON-USER: CPT

## 2024-08-30 RX ORDER — TRAMADOL HYDROCHLORIDE 50 MG/1
50 TABLET ORAL EVERY 6 HOURS PRN
Qty: 21 TABLET | Refills: 0 | Status: SHIPPED | OUTPATIENT
Start: 2024-08-30 | End: 2024-09-04

## 2024-08-30 RX ORDER — GLIMEPIRIDE 4 MG/1
4 TABLET ORAL
Qty: 30 TABLET | Refills: 11 | Status: SHIPPED | OUTPATIENT
Start: 2024-08-30 | End: 2025-08-30

## 2024-08-30 RX ORDER — PREGABALIN 25 MG/1
25 CAPSULE ORAL 2 TIMES DAILY
Qty: 60 CAPSULE | Refills: 2 | Status: SHIPPED | OUTPATIENT
Start: 2024-08-30 | End: 2025-08-30

## 2024-08-30 ASSESSMENT — ENCOUNTER SYMPTOMS
ALLERGIC/IMMUNOLOGIC NEGATIVE: 1
GASTROINTESTINAL NEGATIVE: 1
ENDOCRINE NEGATIVE: 1
LOSS OF SENSATION IN FEET: 0
CONSTITUTIONAL NEGATIVE: 1
PSYCHIATRIC NEGATIVE: 1
CARDIOVASCULAR NEGATIVE: 1
SHORTNESS OF BREATH: 1
NEUROLOGICAL NEGATIVE: 1
BACK PAIN: 1
ARTHRALGIAS: 1
HEMATOLOGIC/LYMPHATIC NEGATIVE: 1
DEPRESSION: 1
CHEST TIGHTNESS: 0
OCCASIONAL FEELINGS OF UNSTEADINESS: 1
EYES NEGATIVE: 1

## 2024-08-30 ASSESSMENT — ANXIETY QUESTIONNAIRES
3. WORRYING TOO MUCH ABOUT DIFFERENT THINGS: NOT AT ALL
5. BEING SO RESTLESS THAT IT IS HARD TO SIT STILL: NOT AT ALL
1. FEELING NERVOUS, ANXIOUS, OR ON EDGE: NOT AT ALL
6. BECOMING EASILY ANNOYED OR IRRITABLE: NOT AT ALL
IF YOU CHECKED OFF ANY PROBLEMS ON THIS QUESTIONNAIRE, HOW DIFFICULT HAVE THESE PROBLEMS MADE IT FOR YOU TO DO YOUR WORK, TAKE CARE OF THINGS AT HOME, OR GET ALONG WITH OTHER PEOPLE: NOT DIFFICULT AT ALL
2. NOT BEING ABLE TO STOP OR CONTROL WORRYING: NOT AT ALL
7. FEELING AFRAID AS IF SOMETHING AWFUL MIGHT HAPPEN: NOT AT ALL
4. TROUBLE RELAXING: NOT AT ALL
GAD7 TOTAL SCORE: 0

## 2024-08-30 ASSESSMENT — ACTIVITIES OF DAILY LIVING (ADL)
BATHING: INDEPENDENT
GROCERY_SHOPPING: INDEPENDENT
DRESSING: INDEPENDENT
TAKING_MEDICATION: INDEPENDENT
MANAGING_FINANCES: INDEPENDENT
DOING_HOUSEWORK: INDEPENDENT

## 2024-08-30 ASSESSMENT — COLUMBIA-SUICIDE SEVERITY RATING SCALE - C-SSRS
6. HAVE YOU EVER DONE ANYTHING, STARTED TO DO ANYTHING, OR PREPARED TO DO ANYTHING TO END YOUR LIFE?: NO
1. IN THE PAST MONTH, HAVE YOU WISHED YOU WERE DEAD OR WISHED YOU COULD GO TO SLEEP AND NOT WAKE UP?: NO
2. HAVE YOU ACTUALLY HAD ANY THOUGHTS OF KILLING YOURSELF?: NO

## 2024-08-30 ASSESSMENT — PAIN SCALES - GENERAL: PAINLEVEL: 10-WORST PAIN EVER

## 2024-08-30 NOTE — ASSESSMENT & PLAN NOTE
Check sugars once daily and stays typically around 150, today was 141.  Endorses diet is lowsey, eats increase in carbs. A1c 9.2 today, last was 9.7.  Endorses diet is not good, and probably will not change.  Back hurts too much to do extra physical activity.   Will increase gymperide to 4mg daily, dicussed with patient that this medication can cause hypoglycemia, to monitor sugars closely and signs/symptoms of low blood sugars.   Continues on atorvastatin 80mg, lipid panel ordered today.

## 2024-08-30 NOTE — PATIENT INSTRUCTIONS
I am ordering labs for you to get when you are fasting (meaning nothing to eat or drink for at least 12 hours before, water and black coffee are okay). Once we get the results, someone from the office will call you. If you do not hear from someone within one week of having your blood drawn, please call us [phone] so that we can go over the results.          It is recommend that you consume a balanced diet to include: fruits, a variety of vegetables (dark green, red and orange, legumes like beans and peas, starch, other), grains (at least half of which are whole grains), fat-free or low-fat dairy including milk,cheese, or fortified soy beverages, and proteins such as lean means, poultry, fish, eggs, nuts, seeds.   Limit processed foods, saturated and trans fats, added sugars and sodium (salt).     Assessment/Plan   Problem List Items Addressed This Visit             ICD-10-CM    Type 2 diabetes mellitus with other specified complication (Multi) E11.69    Relevant Medications    glimepiride (AmaryL) 4 mg tablet    Other Relevant Orders    POCT glycosylated hemoglobin (Hb A1C) manually resulted (Completed)    Follow Up In [facility] - PCP - Established    Essential hypertension I10    Relevant Orders    CBC and Auto Differential    Mixed hyperlipidemia E78.2    Relevant Orders    Lipid Panel    Hypothyroidism (Chronic) E03.9    Relevant Orders    TSH with reflex to Free T4 if abnormal    Medicare annual wellness visit, subsequent Z00.00    Relevant Orders    CBC and Auto Differential    Lipid Panel    TSH with reflex to Free T4 if abnormal    Basic Metabolic Panel    Vitamin D 25-Hydroxy,Total (for eval of Vitamin D levels)    Vitamin B12     Other Visit Diagnoses         Codes    Routine general medical examination at health care facility    -  Primary Z00.00    Right lumbar radiculitis     M54.16    Relevant Medications    pregabalin (Lyrica) 25 mg capsule    Lumbar spondylosis     M47.816    Relevant  Medications    traMADol (Ultram) 50 mg tablet    pregabalin (Lyrica) 25 mg capsule    Vitamin D deficiency     E55.9    Relevant Orders    Vitamin D 25-Hydroxy,Total (for eval of Vitamin D levels)              Ways to Help Prevent Falls at Home    Quick Tips   ? Ask for help if you need it. Most people want to help!   ? Get up slowly after sitting or laying down   ? Wear a medical alert device or keep cell phone in your pocket   ? Use night lights, especially areas near a bathroom   ? Keep the items you use often within reach on a small stool or end table   ? Use an assistive device such as walker or cane, as directed by provider/physical therapy   ? Use a non-slip mat and grab bars in your bathroom. Look for home health sections for best options     Other Areas to Focus On   ? Exercise and nutrition: Regular exercise or taking a falls prevention class are great ways improve strength and balance. Don’t forget to stay hydrated and bring a snack!   ? Medicine side effects: Some medicines can make you sleepy or dizzy, which could cause a fall. Ask your healthcare provider about the side effects your medicines could cause. Be sure to let them know if you take any vitamins or supplements as well.   ? Tripping hazards: Remove items you could trip on, such as loose mats, rugs, cords, and clutter. Wear closed toe shoes with rubber soles.   ? Health and wellness: Get regular checkups with your healthcare provider, plus routine vision and hearing screenings. Talk with your healthcare provider about:   o Your medicines and the possible side effects - bring them in a bag if that is easier!   o Problems with balance or feeling dizzy   o Ways to promote bone health, such as Vitamin D and calcium supplements   o Questions or concerns about falling     *Ask your healthcare team if you have questions     [facility], 2022

## 2024-08-30 NOTE — PROGRESS NOTES
Subjective   Reason for Visit: Victorina Adamson is an 74 y.o. female here for a Medicare Wellness visit.     Past Medical, Surgical, and Family History reviewed and updated in chart.    Reviewed all medications by prescribing practitioner or clinical pharmacist (such as prescriptions, OTCs, herbal therapies and supplements) and documented in the medical record.   OARRS:  Jennifer Aguirre, FRANCISCO-CNP on 8/30/2024 10:15 AM  I have personally reviewed the OARRS report for Victorina Adamson. I have considered the risks of abuse, dependence, addiction and diversion    Is the patient prescribed a combination of a benzodiazepine and opioid?  No    Last Urine Drug Screen / ordered today: Yes  No results found for this or any previous visit (from the past 8760 hour(s)).  Results are as expected.         Controlled Substance Agreement:  Date of the Last Agreement: 08/30/24  Reviewed Controlled Substance Agreement including but not limited to the benefits, risks, and alternatives to treatment with a Controlled Substance medication(s).    Lyrica:  What is the patient's goal of therapy? Decrease nerve pain  Is this being achieved with current treatment? moderately    Pain Assessment:  No data recorded    Activities of Daily Living:  Is your overall impression that this patient is benefiting (symptom reduction outweighs side effects) from Lyrica therapy? Yes     1. Physical Functioning: Better  2. Family Relationship: Same  3. Social Relationship: Same  4. Mood: Same  5. Sleep Patterns: Same  6. Overall Function: Better    HPI  Patient in office for medicare wellness.    Has been having increase back pain taking ibuprofen, if that does not work takes antihistamine to help sleep. The pain started a few months ago  Has appointment next Wednesday with spine doctor in [facility]. Uses walker and cane daily for ambulation, does endorse last fall 5 months ago.   Check sugars once daily and stays typically around  "150, today was 141.  Endorses diet is lowsey, eats increase in carbs. A1c 9.2 today, last was 9.7.  Endorses diet is not good, and probably will not change.  Back hurts too much to do extra physical activity.   Will increase gymperide to 4mg daily, dicussed with patient that this medication can cause hypoglycemia, to monitor sugars closely and signs/symptoms of low blood sugars.     Patient Care Team:  FRANCISCO Orellana-CNP as PCP - General (Family Medicine)  Nasim Gonzales DO as PCP - [employer] PCP  Reji Powell MD as Consulting Physician (Orthopaedic Surgery)  Devyn Brooks MD as Consulting Physician (Cardiology)     Review of Systems   Constitutional: Negative.    HENT: Negative.     Eyes: Negative.    Respiratory:  Positive for shortness of breath. Negative for chest tightness.    Cardiovascular: Negative.  Negative for chest pain.   Gastrointestinal: Negative.    Endocrine: Negative.    Genitourinary: Negative.    Musculoskeletal:  Positive for arthralgias and back pain.   Skin: Negative.    Allergic/Immunologic: Negative.    Neurological: Negative.    Hematological: Negative.    Psychiatric/Behavioral: Negative.     All other systems reviewed and are negative.      Objective   Vitals:  /64 (BP Location: Right arm, Patient Position: Sitting, BP Cuff Size: Adult)   Pulse 84   Resp 16   Ht 1.575 m (5' 2\")   Wt 88.9 kg (196 lb)   SpO2 98%   BMI 35.85 kg/m²       Physical Exam  Constitutional:       Appearance: Normal appearance. She is obese.   HENT:      Head: Normocephalic and atraumatic.      Nose: Nose normal.      Mouth/Throat:      Mouth: Mucous membranes are moist.      Pharynx: Oropharynx is clear.   Eyes:      Pupils: Pupils are equal, round, and reactive to light.   Cardiovascular:      Rate and Rhythm: Normal rate and regular rhythm.      Pulses: Normal pulses.      Heart sounds: Normal heart sounds.   Pulmonary:      Effort: Pulmonary effort is normal.    "   Breath sounds: Normal breath sounds.   Musculoskeletal:      Cervical back: Normal range of motion.      Comments: Uses walker   Skin:     General: Skin is warm and dry.   Neurological:      General: No focal deficit present.      Mental Status: She is alert and oriented to person, place, and time.   Psychiatric:         Mood and Affect: Mood normal.         Behavior: Behavior normal.         Assessment/Plan   Problem List Items Addressed This Visit             ICD-10-CM    Cardiomyopathy (Multi) I42.9     Follows with cardiology. BP well continues with currently medications.         Type 2 diabetes mellitus with other specified complication (Multi) E11.69    Relevant Medications    glimepiride (AmaryL) 4 mg tablet    Other Relevant Orders    POCT glycosylated hemoglobin (Hb A1C) manually resulted (Completed)    Follow Up In [facility] - PCP - Established    Essential hypertension I10    Relevant Orders    CBC and Auto Differential    Mixed hyperlipidemia E78.2    Relevant Orders    Lipid Panel    Hypothyroidism (Chronic) E03.9    Relevant Orders    TSH with reflex to Free T4 if abnormal    Atrial fibrillation (Multi) (Chronic) I48.91     Continues on eliquis 5mg BID, follows with cardiology.          Recurrent major depressive disorder (CMS-HCC) F33.9     Assessed and stable. PHQ score 0.         Acute exacerbation of chronic obstructive pulmonary disease (Multi) J44.1     Assessed and stable. Shortness of breath with exertion at times, has PRN albuterol as needed.          DM type 2 with diabetic dyslipidemia (Multi) (Chronic) E11.69, E78.5     Check sugars once daily and stays typically around 150, today was 141.  Endorses diet is lowsey, eats increase in carbs. A1c 9.2 today, last was 9.7.  Endorses diet is not good, and probably will not change.  Back hurts too much to do extra physical activity.   Will increase gymperide to 4mg daily, dicussed with patient that this medication can cause  hypoglycemia, to monitor sugars closely and signs/symptoms of low blood sugars.   Continues on atorvastatin 80mg, lipid panel ordered today.         Medicare annual wellness visit, subsequent Z00.00    Relevant Orders    CBC and Auto Differential    Lipid Panel    TSH with reflex to Free T4 if abnormal    Basic Metabolic Panel    Vitamin D 25-Hydroxy,Total (for eval of Vitamin D levels)    Vitamin B12    Obesity, morbid (Multi) E66.01     Diet and exercise dicussed.           Other Visit Diagnoses         Codes    Routine general medical examination at health care facility    -  Primary Z00.00    Right lumbar radiculitis     M54.16    Relevant Medications    pregabalin (Lyrica) 25 mg capsule    Lumbar spondylosis     M47.816    Relevant Medications    traMADol (Ultram) 50 mg tablet    pregabalin (Lyrica) 25 mg capsule    Vitamin D deficiency     E55.9    Relevant Orders    Vitamin D 25-Hydroxy,Total (for eval of Vitamin D levels)    Therapeutic drug monitoring     Z51.81    Relevant Orders    Drug Screen, Urine With Reflex to Confirmation              Advance Directives Discussion  16 - 20 minutes were spent discussing Advanced Care Planning (including a Living Will, Medical Power Of , as well as specific end of life choices and/or directives). The details of that discussion were documented in Advanced Directives Discussion section of the medical record.    Patient was identified as a fall risk. Risk prevention instructions provided.

## 2024-08-31 LAB
AMPHETAMINES UR QL SCN: NORMAL
BARBITURATES UR QL SCN: NORMAL
BENZODIAZ UR QL SCN: NORMAL
BZE UR QL SCN: NORMAL
CANNABINOIDS UR QL SCN: NORMAL
FENTANYL+NORFENTANYL UR QL SCN: NORMAL
METHADONE UR QL SCN: NORMAL
OPIATES UR QL SCN: NORMAL
OXYCODONE+OXYMORPHONE UR QL SCN: NORMAL
PCP UR QL SCN: NORMAL

## 2024-09-11 ENCOUNTER — TELEPHONE (OUTPATIENT)
Dept: PRIMARY CARE | Facility: CLINIC | Age: 74
End: 2024-09-11
Payer: MEDICARE

## 2024-09-11 DIAGNOSIS — M47.816 LUMBAR SPONDYLOSIS: ICD-10-CM

## 2024-09-11 RX ORDER — TRAMADOL HYDROCHLORIDE 50 MG/1
50 TABLET ORAL EVERY 6 HOURS PRN
Qty: 21 TABLET | Refills: 0 | OUTPATIENT
Start: 2024-09-11 | End: 2024-09-16

## 2024-09-11 NOTE — TELEPHONE ENCOUNTER
VM Med Refill     Patient called requesting a refill of pain med did not specify name     [facility] - [place] ([place]), [place] - [address]  [address] , [place] ([place]) [place] [address]  Phone: [phone]  Fax: [phone]  FERNANDO #: [id number]     LOV: 8/30/24     NOV: 12/02/24

## 2024-09-12 DIAGNOSIS — E55.9 VITAMIN D DEFICIENCY: Primary | ICD-10-CM

## 2024-09-16 ENCOUNTER — LAB (OUTPATIENT)
Dept: LAB | Facility: LAB | Age: 74
End: 2024-09-16
Payer: MEDICARE

## 2024-09-16 DIAGNOSIS — I10 ESSENTIAL HYPERTENSION: ICD-10-CM

## 2024-09-16 DIAGNOSIS — E03.8 OTHER SPECIFIED HYPOTHYROIDISM: ICD-10-CM

## 2024-09-16 DIAGNOSIS — E55.9 VITAMIN D DEFICIENCY: ICD-10-CM

## 2024-09-16 DIAGNOSIS — E78.2 MIXED HYPERLIPIDEMIA: ICD-10-CM

## 2024-09-16 DIAGNOSIS — Z00.00 MEDICARE ANNUAL WELLNESS VISIT, SUBSEQUENT: ICD-10-CM

## 2024-09-16 LAB
25(OH)D3 SERPL-MCNC: 9 NG/ML (ref 30–100)
ANION GAP SERPL CALC-SCNC: 10 MMOL/L (ref 10–20)
BASOPHILS # BLD AUTO: 0.03 X10*3/UL (ref 0–0.1)
BASOPHILS NFR BLD AUTO: 0.3 %
BUN SERPL-MCNC: 12 MG/DL (ref 6–23)
CALCIUM SERPL-MCNC: 7.9 MG/DL (ref 8.6–10.3)
CHLORIDE SERPL-SCNC: 109 MMOL/L (ref 98–107)
CHOLEST SERPL-MCNC: 79 MG/DL (ref 0–199)
CHOLESTEROL/HDL RATIO: 2
CO2 SERPL-SCNC: 26 MMOL/L (ref 21–32)
CREAT SERPL-MCNC: 0.77 MG/DL (ref 0.5–1.05)
EGFRCR SERPLBLD CKD-EPI 2021: 81 ML/MIN/1.73M*2
EOSINOPHIL # BLD AUTO: 0.61 X10*3/UL (ref 0–0.4)
EOSINOPHIL NFR BLD AUTO: 7 %
ERYTHROCYTE [DISTWIDTH] IN BLOOD BY AUTOMATED COUNT: 16.7 % (ref 11.5–14.5)
GLUCOSE SERPL-MCNC: 101 MG/DL (ref 74–99)
HCT VFR BLD AUTO: 33.4 % (ref 36–46)
HDLC SERPL-MCNC: 38.8 MG/DL
HGB BLD-MCNC: 9.2 G/DL (ref 12–16)
IMM GRANULOCYTES # BLD AUTO: 0.05 X10*3/UL (ref 0–0.5)
IMM GRANULOCYTES NFR BLD AUTO: 0.6 % (ref 0–0.9)
LDLC SERPL CALC-MCNC: 15 MG/DL
LYMPHOCYTES # BLD AUTO: 1.86 X10*3/UL (ref 0.8–3)
LYMPHOCYTES NFR BLD AUTO: 21.5 %
MCH RBC QN AUTO: 21.4 PG (ref 26–34)
MCHC RBC AUTO-ENTMCNC: 27.5 G/DL (ref 32–36)
MCV RBC AUTO: 78 FL (ref 80–100)
MONOCYTES # BLD AUTO: 0.65 X10*3/UL (ref 0.05–0.8)
MONOCYTES NFR BLD AUTO: 7.5 %
NEUTROPHILS # BLD AUTO: 5.46 X10*3/UL (ref 1.6–5.5)
NEUTROPHILS NFR BLD AUTO: 63.1 %
NON HDL CHOLESTEROL: 40 MG/DL (ref 0–149)
NRBC BLD-RTO: 0 /100 WBCS (ref 0–0)
PLATELET # BLD AUTO: 387 X10*3/UL (ref 150–450)
POTASSIUM SERPL-SCNC: 3.6 MMOL/L (ref 3.5–5.3)
RBC # BLD AUTO: 4.3 X10*6/UL (ref 4–5.2)
SODIUM SERPL-SCNC: 141 MMOL/L (ref 136–145)
T4 FREE SERPL-MCNC: 0.97 NG/DL (ref 0.61–1.12)
TRIGL SERPL-MCNC: 126 MG/DL (ref 0–149)
TSH SERPL-ACNC: 5.77 MIU/L (ref 0.44–3.98)
VIT B12 SERPL-MCNC: 433 PG/ML (ref 211–911)
VLDL: 25 MG/DL (ref 0–40)
WBC # BLD AUTO: 8.7 X10*3/UL (ref 4.4–11.3)

## 2024-09-16 PROCEDURE — 36415 COLL VENOUS BLD VENIPUNCTURE: CPT

## 2024-09-16 PROCEDURE — 80048 BASIC METABOLIC PNL TOTAL CA: CPT

## 2024-09-16 PROCEDURE — 82306 VITAMIN D 25 HYDROXY: CPT

## 2024-09-16 PROCEDURE — 84439 ASSAY OF FREE THYROXINE: CPT

## 2024-09-16 PROCEDURE — 85025 COMPLETE CBC W/AUTO DIFF WBC: CPT

## 2024-09-16 PROCEDURE — 80061 LIPID PANEL: CPT

## 2024-09-16 PROCEDURE — 82607 VITAMIN B-12: CPT

## 2024-09-16 PROCEDURE — 84443 ASSAY THYROID STIM HORMONE: CPT

## 2024-09-16 RX ORDER — ERGOCALCIFEROL 1.25 MG/1
50000 CAPSULE ORAL WEEKLY
Qty: 12 CAPSULE | Refills: 0 | Status: SHIPPED | OUTPATIENT
Start: 2024-09-16 | End: 2024-12-09

## 2024-09-17 ENCOUNTER — TELEPHONE (OUTPATIENT)
Dept: PRIMARY CARE | Facility: CLINIC | Age: 74
End: 2024-09-17
Payer: MEDICARE

## 2024-09-17 NOTE — TELEPHONE ENCOUNTER
1st attempt  Lmovm informing patient to give the office a call back to retrieve providers message  Called mobile phone and home phone

## 2024-09-17 NOTE — TELEPHONE ENCOUNTER
----- Message from Jennifer Aguirre sent at 9/17/2024  5:03 PM EDT -----  Is patient taking vitmain D as ordered? Level is 9, very low, needs to take supplement as ordered once weekly. Thyroid level has decreased since last blood draw. Blood levels have dropped,  from 11-9.2 in 4 months.  Is patient  having any bleeding anywhere? In stools? Is she taking an iron supplement? If not iron supplement I recommend taking one daily.

## 2024-09-18 ENCOUNTER — APPOINTMENT (OUTPATIENT)
Dept: RADIOLOGY | Facility: HOSPITAL | Age: 74
End: 2024-09-18
Payer: MEDICARE

## 2024-09-19 ENCOUNTER — HOSPITAL ENCOUNTER (OUTPATIENT)
Dept: RADIOLOGY | Facility: HOSPITAL | Age: 74
Discharge: HOME | End: 2024-09-19
Payer: MEDICARE

## 2024-09-19 DIAGNOSIS — M54.16 RADICULOPATHY, LUMBAR REGION: ICD-10-CM

## 2024-09-19 DIAGNOSIS — S32.050A: ICD-10-CM

## 2024-09-19 PROCEDURE — 72148 MRI LUMBAR SPINE W/O DYE: CPT

## 2024-10-08 DIAGNOSIS — F41.9 ANXIETY: ICD-10-CM

## 2024-10-08 DIAGNOSIS — K21.9 MILD ACID REFLUX: ICD-10-CM

## 2024-10-08 RX ORDER — PANTOPRAZOLE SODIUM 40 MG/1
40 TABLET, DELAYED RELEASE ORAL DAILY
Qty: 90 TABLET | Refills: 1 | Status: SHIPPED | OUTPATIENT
Start: 2024-10-08 | End: 2025-04-06

## 2024-10-08 RX ORDER — SERTRALINE HYDROCHLORIDE 100 MG/1
100 TABLET, FILM COATED ORAL DAILY
Qty: 90 TABLET | Refills: 1 | Status: SHIPPED | OUTPATIENT
Start: 2024-10-08 | End: 2025-04-06

## 2024-10-08 NOTE — TELEPHONE ENCOUNTER
Patient called to request medication refill.    Last appointment with our providers: 08/30/2024  Next appointment with our providers: 12/02/2024  Name of Medication: Pantoprazole 40 mg, Sertraline 100 mg     Pharmacy: [facility]     
Alert and oriented to person, place and time, memory intact, behavior appropriate to situation, PERRL.

## 2024-10-09 ENCOUNTER — OFFICE VISIT (OUTPATIENT)
Dept: PRIMARY CARE | Facility: CLINIC | Age: 74
End: 2024-10-09
Payer: MEDICARE

## 2024-10-09 VITALS
DIASTOLIC BLOOD PRESSURE: 60 MMHG | BODY MASS INDEX: 36.25 KG/M2 | SYSTOLIC BLOOD PRESSURE: 120 MMHG | HEIGHT: 62 IN | WEIGHT: 197 LBS | HEART RATE: 87 BPM | OXYGEN SATURATION: 96 %

## 2024-10-09 DIAGNOSIS — R22.43 LOCALIZED SWELLING OF BOTH LOWER LEGS: ICD-10-CM

## 2024-10-09 DIAGNOSIS — G62.9 NEUROPATHY: Primary | ICD-10-CM

## 2024-10-09 PROCEDURE — 1160F RVW MEDS BY RX/DR IN RCRD: CPT

## 2024-10-09 PROCEDURE — 3046F HEMOGLOBIN A1C LEVEL >9.0%: CPT

## 2024-10-09 PROCEDURE — 99214 OFFICE O/P EST MOD 30 MIN: CPT

## 2024-10-09 PROCEDURE — 1157F ADVNC CARE PLAN IN RCRD: CPT

## 2024-10-09 PROCEDURE — 1036F TOBACCO NON-USER: CPT

## 2024-10-09 PROCEDURE — 1123F ACP DISCUSS/DSCN MKR DOCD: CPT

## 2024-10-09 PROCEDURE — 3074F SYST BP LT 130 MM HG: CPT

## 2024-10-09 PROCEDURE — 3078F DIAST BP <80 MM HG: CPT

## 2024-10-09 PROCEDURE — 1159F MED LIST DOCD IN RCRD: CPT

## 2024-10-09 PROCEDURE — 4010F ACE/ARB THERAPY RXD/TAKEN: CPT

## 2024-10-09 PROCEDURE — 3048F LDL-C <100 MG/DL: CPT

## 2024-10-09 RX ORDER — FUROSEMIDE 20 MG/1
20 TABLET ORAL DAILY
Qty: 5 TABLET | Refills: 0 | Status: SHIPPED | OUTPATIENT
Start: 2024-10-09 | End: 2024-10-14

## 2024-10-09 RX ORDER — PREGABALIN 50 MG/1
50 CAPSULE ORAL 2 TIMES DAILY
Qty: 180 CAPSULE | Refills: 1 | Status: SHIPPED | OUTPATIENT
Start: 2024-10-09 | End: 2025-04-07

## 2024-10-09 ASSESSMENT — PATIENT HEALTH QUESTIONNAIRE - PHQ9
SUM OF ALL RESPONSES TO PHQ9 QUESTIONS 1 AND 2: 0
2. FEELING DOWN, DEPRESSED OR HOPELESS: NOT AT ALL
1. LITTLE INTEREST OR PLEASURE IN DOING THINGS: NOT AT ALL

## 2024-10-09 ASSESSMENT — ENCOUNTER SYMPTOMS
OCCASIONAL FEELINGS OF UNSTEADINESS: 1
DEPRESSION: 0
LOSS OF SENSATION IN FEET: 1
NUMBNESS: 1

## 2024-10-09 NOTE — PATIENT INSTRUCTIONS
Assessment/Plan   Problem List Items Addressed This Visit    None  Visit Diagnoses       Neuropathy    -  Primary    Relevant Medications    pregabalin (Lyrica) 50 mg capsule    Localized swelling of both lower legs        Relevant Medications    furosemide (Lasix) 20 mg tablet          Will add on furosemide 20mg in addition to regularly ordered 10mg to help with lower extremities swelling. Patient is stable with no complaints of shortness of breath, lung sound diminished but clear.    Will increase lyrica to 50mg BID to help with increase in neuropathy pain.  I do recommend compression stockings to help with the swelling, it can also help with the pain.  Some of the pain is a result of the swelling.  Also have legs up and elevated above heart level at all times when sitting.

## 2024-10-09 NOTE — PROGRESS NOTES
"Subjective   Patient ID: Victorina Adamson is a 74 y.o. female who presents for Leg Swelling (Bilateral leg swelling x 3-4 weeks).    Past Medical, Surgical, and Family History reviewed and updated in chart.     Reviewed all medications by prescribing practitioner or clinical pharmacist (such as prescriptions, OTCs, herbal therapies and supplements) and documented in the medical record.    HPI   74 yof in office with concerns for bilateral lower leg swelling and pain. Is not wearing compression stockings, states can not tolerate them.  Endorses she elevates legs when in recliner. States it feels like pins and needle and that I have a sock on all the time. Denies shortness of breath.    Review of Systems   Cardiovascular:  Positive for leg swelling.   Neurological:  Positive for numbness.       Objective   /60   Pulse 87   Ht 1.575 m (5' 2.01\")   Wt 89.4 kg (197 lb)   SpO2 96%   BMI 36.02 kg/m²     Physical Exam  Constitutional:       Appearance: Normal appearance.   Cardiovascular:      Rate and Rhythm: Normal rate and regular rhythm.      Pulses: Normal pulses.           Dorsalis pedis pulses are 2+ on the right side and 2+ on the left side.      Heart sounds: Normal heart sounds.   Pulmonary:      Effort: Pulmonary effort is normal.      Breath sounds: Normal breath sounds.   Musculoskeletal:      Right ankle: Swelling present.      Left ankle: Swelling present.      Comments: +2 non pitting edema to bilateral ankles and lower legs   Feet:      Right foot:      Protective Sensation: 5 sites tested.  5 sites sensed.      Left foot:      Protective Sensation: 5 sites tested.  5 sites sensed.   Neurological:      Mental Status: She is alert.         Assessment/Plan   Problem List Items Addressed This Visit    None  Visit Diagnoses       Neuropathy    -  Primary    Relevant Medications    pregabalin (Lyrica) 50 mg capsule    Localized swelling of both lower legs        Relevant Medications    furosemide " (Lasix) 20 mg tablet          Will add on furosemide 20mg in addition to regularly ordered 10mg to help with lower extremities swelling. Patient is stable with no complaints of shortness of breath, lung sound diminished but clear.    Will increase lyrica to 50mg BID to help with increase in neuropathy pain.  I do recommend compression stockings to help with the swelling, it can also help with the pain.  Some of the pain is a result of the swelling.  Also have legs up and elevated above heart level at all times when sitting.

## 2024-10-18 PROBLEM — I50.32 HEART FAILURE WITH IMPROVED EJECTION FRACTION (HFIMPEF): Status: ACTIVE | Noted: 2024-10-18

## 2024-10-18 PROBLEM — E78.5 DYSLIPIDEMIA: Status: ACTIVE | Noted: 2024-10-18

## 2024-10-18 ASSESSMENT — ENCOUNTER SYMPTOMS
WHEEZING: 0
VOMITING: 0
ORTHOPNEA: 0
CHILLS: 0
SYNCOPE: 0
COUGH: 0
SHORTNESS OF BREATH: 0
PALPITATIONS: 0
NAUSEA: 0
HEMATOCHEZIA: 0
IRREGULAR HEARTBEAT: 0
HEMATURIA: 0
NEAR-SYNCOPE: 0
FEVER: 0
ALTERED MENTAL STATUS: 0

## 2024-10-18 NOTE — PROGRESS NOTES
Chief Complaint/Reason for Visit:  Pre-op Clearance (For back surgery  ) and 6 month follow up 1 month cardiovascular follow up    History Of Present Illness:    Victorina Adamson is a 74 y.o. female that presents to the office for 1 month follow up.    Taking medications as prescribed.     PMH is significant for arthritis, CAD s/p CABG x4 (LIMA-LAD, SVG-OM1 distally and SVG sequentially-RCA and PDA) 04/21/2022, atrial fibrillation s/p DCC 05/17/2022, CHF, depression, DM, HTN, hyperlipidemia, hypothyroidism, SHARON and shingles.     She reports that she takes care of her 14 year old (who has autism) and 10 year old grandson.  She also lives with her two sons.    Ambulates with a walker. She reports her PCP increased furosemide 20 mg daily d/t BLE edema about 2-3 weeks ago.    EKG personally reviewed today showed SR with HR 72 bpm.  This will go to the cardiologist for final review.     Past Medical History:  She has a past medical history of Acute upper respiratory infection, unspecified (02/11/2022), Anxiety disorder, unspecified, Chronic tension-type headache, not intractable, Diverticulosis of intestine, part unspecified, without perforation or abscess without bleeding, Encounter for general adult medical examination without abnormal findings (09/20/2021), Encounter for other screening for malignant neoplasm of breast (02/11/2021), Encounter for preprocedural cardiovascular examination, Encounter for screening for malignant neoplasm of colon (02/11/2021), Lumbago with sciatica, right side (09/20/2021), Major depressive disorder, single episode, mild (CMS-HCC) (09/20/2021), Obesity, unspecified (06/22/2020), Other intervertebral disc degeneration, lumbar region (03/15/2022), Other specified health status (06/22/2020), Other unilateral secondary osteoarthritis of hip, Pain in right hip (10/02/2021), Personal history of other diseases of the digestive system, Personal history of other diseases of the digestive system,  Personal history of other diseases of the musculoskeletal system and connective tissue (06/08/2021), Personal history of other diseases of the nervous system and sense organs (03/28/2022), Personal history of other infectious and parasitic diseases (06/08/2021), Personal history of other infectious and parasitic diseases (06/18/2020), Personal history of other mental and behavioral disorders (03/15/2022), Personal history of other specified conditions, Personal history of other specified conditions (02/11/2021), Personal history of other specified conditions (06/22/2020), Spinal stenosis, lumbosacral region (11/23/2021), Spondylosis without myelopathy or radiculopathy, lumbar region (11/30/2021), and Unspecified fall, subsequent encounter (09/20/2021).    Past Surgical History:  She has a past surgical history that includes Other surgical history (06/17/2020); Other surgical history (06/17/2020); Other surgical history (06/17/2020); Other surgical history (05/19/2022); Other surgical history (05/26/2022); Other surgical history (06/01/2022); Other surgical history (03/15/2022); Other surgical history (02/11/2021); Other surgical history (10/18/2022); Other surgical history (09/20/2021); and IR injection joint (10/13/2021).      Social History:  She reports that she has never smoked. She has never used smokeless tobacco. She reports current alcohol use. She reports that she does not use drugs.    Family History:  Family History   Problem Relation Name Age of Onset    Hypertension Mother          benign essential    Arthritis Mother      Coronary artery disease Mother      Depression Mother      Diabetes Mother      Other (substance abuse) Mother      Lung cancer Father      Other (substance abuse) Father      Diabetes Sister      Hypertension Brother          benign essential    Lung cancer Brother          Allergies:  Gabapentin, Hydrocodone-acetaminophen, Influenza virus vaccines, Lisinopril, and  Oxycodone    Review of Systems   Constitutional: Positive for malaise/fatigue. Negative for chills and fever.   Cardiovascular:  Positive for dyspnea on exertion (chronic) and leg swelling. Negative for chest pain, irregular heartbeat, near-syncope, orthopnea, palpitations and syncope.   Respiratory:  Negative for cough, shortness of breath and wheezing.    Musculoskeletal:  Positive for back pain and joint pain (hip pain).   Gastrointestinal:  Positive for diarrhea (chronic). Negative for hematochezia, melena, nausea and vomiting.   Genitourinary:  Negative for hematuria.   Neurological:  Positive for dizziness (intermittent).   Psychiatric/Behavioral:  Negative for altered mental status.        Objective      Vitals reviewed.   Constitutional:       Appearance: Not in distress.   Neck:      Vascular: No carotid bruit.   Pulmonary:      Effort: Pulmonary effort is normal.      Breath sounds: Normal breath sounds.   Cardiovascular:      PMI at left midclavicular line. Normal rate. Regular rhythm. S1 with normal intensity. S2 with normal intensity.       Murmurs: There is no murmur.   Edema:     Peripheral edema present.     Pretibial: bilateral 1+ pitting edema of the pretibial area.     Ankle: bilateral 1+ pitting edema of the ankle.     Feet: bilateral 1+ pitting edema of the feet.  Abdominal:      General: Bowel sounds are normal.   Neurological:      Mental Status: Alert and oriented to person, place and time.         Current Outpatient Medications   Medication Instructions    acetaminophen (TYLENOL) 1,000 mg, Every 6 hours PRN    albuterol 90 mcg/actuation inhaler 2 puffs, inhalation, Every 4 hours PRN    apixaban (Eliquis) 5 mg tablet TAKE 1 TABLET BY MOUTH TWO TIMES A DAY    aspirin 81 mg, Daily    atorvastatin (LIPITOR) 80 mg, oral, Nightly    blood glucose control, low (True Metrix Level 1) solution 1 each, Topical, See admin instructions    carvedilol (COREG) 6.25 mg, oral, 2 times daily (morning and late  afternoon)    DropSafe Alcohol Prep Pads pads, medicated USE AS DIRECTED    empagliflozin (JARDIANCE) 25 mg, oral, Daily    ergocalciferol (VITAMIN D-2) 50,000 Units, oral, Weekly    ezetimibe (ZETIA) 10 mg, oral, Daily    furosemide (LASIX) 10 mg, oral, Daily    furosemide (LASIX) 20 mg, oral, Daily, Take an additional 20mg, with scheduled 10mg in the morning for 5 days for increase swelling to bilateral lower extremities.    glimepiride (AMARYL) 4 mg, oral, Daily before breakfast    levothyroxine (SYNTHROID, LEVOXYL) 50 mcg, oral, Daily    losartan (COZAAR) 25 mg, oral, Daily    meclizine (ANTIVERT) 25 mg, oral, 3 times daily PRN    metFORMIN (GLUCOPHAGE) 1,000 mg, oral    NON FORMULARY 2 each, Daily    NON FORMULARY 1 each, Daily    pantoprazole (PROTONIX) 40 mg, oral, Daily    pregabalin (LYRICA) 50 mg, oral, 2 times daily    sertraline (ZOLOFT) 100 mg, oral, Daily    True Metrix Air Glucose Meter kit     True Metrix Glucose Test Strip strip USE AS DIRECTED    TRUEplus Lancets 33 gauge misc USE AS DIRECTED        Last Labs:  CBC -  Lab Results   Component Value Date    WBC 8.7 09/16/2024    HGB 9.2 (L) 09/16/2024    HCT 33.4 (L) 09/16/2024    MCV 78 (L) 09/16/2024     09/16/2024       RENAL FUNCTION PANEL -   Lab Results   Component Value Date    GLUCOSE 101 (H) 09/16/2024     09/16/2024    K 3.6 09/16/2024     (H) 09/16/2024    CO2 26 09/16/2024    ANIONGAP 10 09/16/2024    BUN 12 09/16/2024    CREATININE 0.77 09/16/2024    GFRMALE CANCELED 02/24/2023    CALCIUM 7.9 (L) 09/16/2024    PHOS 4.8 05/03/2022    ALBUMIN 4.0 04/23/2024        CMP -  Lab Results   Component Value Date    CALCIUM 7.9 (L) 09/16/2024    PHOS 4.8 05/03/2022    PROT 6.3 (L) 04/23/2024    ALBUMIN 4.0 04/23/2024    AST 16 04/23/2024    ALT 23 04/23/2024    ALKPHOS 69 04/23/2024    BILITOT 0.4 04/23/2024       LIPID PANEL -   Lab Results   Component Value Date    CHOL 79 09/16/2024    TRIG 126 09/16/2024    HDL 38.8  "09/16/2024    CHHDL 2.0 09/16/2024    LDLF 99 05/10/2023    VLDL 25 09/16/2024    NHDL 40 09/16/2024     Lab Results   Component Value Date    LDLCALC 15 09/16/2024       Lab Results   Component Value Date     (H) 01/01/2024    HGBA1C 9.2 (A) 08/30/2024       Lab Results   Component Value Date    TSH 5.77 (H) 09/16/2024       No results found for this or any previous visit.     Last Cardiology Tests:    Echo 12/26/23:    1. Left ventricular systolic function is low normal with a 50-55% estimated ejection fraction.   2. Spectral Doppler shows a pseudonormal pattern of left ventricular diastolic filling.   3. There are elevated left atrial and left ventricular end diastolic pressures.   4. There is low normal right ventricular systolic function.   5. Mildly elevated RVSP.    TTE 1/18/2023 showed LV systolic function is moderately decreased with an EF of 35%. Abnormal pattern of LV diastolic filling. Mild to moderate MR. Moderate tricuspid regurgitation.     [facility] 12/1/2022 showed patent LIMA to LAD and SVG to circumflex/OM. Occluded SVG to RCA with nonhemodynamically significant disease of the RCA.     Carotid artery duplex 4/6/2022 showed findings are consistent with less than 50% stenosis of bilateral proximal ICA.    Visit Vitals  /62 (BP Location: Left arm, Patient Position: Sitting, BP Cuff Size: Adult)   Pulse 75   Ht 1.575 m (5' 2.01\")   Wt 86.2 kg (190 lb)   SpO2 96%   BMI 34.74 kg/m²   OB Status Postmenopausal   Smoking Status Never   BSA 1.94 m²       Assessment/Plan   The primary encounter diagnosis was Arteriosclerosis of coronary artery. Diagnoses of Paroxysmal atrial fibrillation (Multi), Heart failure with improved ejection fraction (HFimpEF), Dyslipidemia, and Preoperative cardiovascular examination were also pertinent to this visit.    1. CAD s/p CABG April 2022  Continue aspirin 81 mg daily, carvedilol 6.25 mg twice daily and atorvastatin 80 mg daily  [facility] December 2022 with patent LIMA to " LAD and SVG to the circumflex/OM, but occluded SVG to RCA  TTE January 2023 with LVEF 35%  Repeat TTE Dec 2023 with LVEF 50-55%     2. Paroxysmal afib  Hypertension - Yes, 1 point, Diabetes - Yes, 1 point, Congestive Heart Failure  or Left ventricular Dysfunction - Yes, 1 point, Female - Yes, 1 point, and Age over 75 years - 2 points  [id number] score is 6.   Continue apixaban 5 mg BID    Continue carvedilol 6.25 mg twice daily  Referral entered for  clinical pharmacy regarding cost of apixaban (and empagliflozin)     3. Chronic systolic heart failure - EF improved  Mildly volume overloaded on exam  Continue current medications which include:  Beta blocker: Carvedilol 6.25 mg BID  ACE inhibitor/ARB/ARNI: Losartan 25 mg daily Entresto was too expensive.  Lisinopril caused cough.  MRA: None  SGLT2i:  empagliflozin 25 mg daily (for DM)  Diuretic: Furosemide 20 mg daily   Hydralazine/Nitrate: None  Device: None  TTE January 2023 with LVEF 35%  Repeat TTE Dec 2023 with LVEF 50-55%  Recently increased furosemide to 20 mg daily about 2-3 weeks ago for BLE edema.  BLE edema did not improve.  Check BMP and BNP.  If renal function stable, consider increased dose of furosemide  Educated regarding 2,000 mg sodium diet as she admits to eating salted pretzels    4. Dyslipidemia  Goal LDL less than 70. She is at goal.  Continue atorvastatin 80 mg daily  Continue ezetimibe 10 mg daily    5. Diarrhea, anemia, fatigue  She states she had had diarrhea for months.  Denies any blood in stool.  Last colonoscopy ~10 years ago  Refer to GI ordered Jan 2024. New referral ordered today as she reports ongoing diarrhea    6. Preoperative cardiovascular risk stratification  The patient states that they are being evaluated for cardiac risk prior to foraminotomy L5-S1 with Dr. Orozco on 10/29/24 at [facility] in [place].  The patient has history of: left ventricular dysfunction and ischemic cardiovascular disease  No known history of  Ischemic cerebrovascular disease, Insulin-dependent diabetes mellitus , or Significant renal dysfunction with creatinine >2  The patient is not scheduled for a high risk surgery (intrathoracic; suprainguinal vascular or intraperitoneal)  RCRI score is 2  Patient will need to hold apixaban preprocedure (she is unsure how long prior to surgery that surgeon recommended and will look at paperwork once at home) and restart as soon as felt safe to do so by the performing provider.    The patient understands that there is an increased risk of thromboembolism associated with temporary interruption of oral anticoagulation.  Patient has a history of coronary artery disease.  Recommend continuing aspirin 81 mg daily up to and including day of procedure without interruption.  Recommend continuing beta blocker without interruption through perioperative period.  Overall, the patient is at intermediate risk for perioperative MACE.  Patient expressed understanding of the risk for perioperative cardiovascular complications.  Dr. Devyn Brooks has also signed cardiac risk note this morning in [facility]       Tigist Calle, APRN-CNP

## 2024-10-22 ENCOUNTER — DOCUMENTATION (OUTPATIENT)
Dept: CARDIOLOGY | Facility: HOSPITAL | Age: 74
End: 2024-10-22
Payer: MEDICARE

## 2024-10-22 NOTE — PROGRESS NOTES
Request from Dr. Orozco for Foraminotomy L5-S1 right    Diagnosis/what are we seeing for:  CAD, CABG, AFIB, HF    Last ischemic work up left heart cath  Date: 22  Anticoagulation: Apixaban  Antiplatelet: ASA  Has patient had a recent cardioversion or ablation?  no     Last seen by  24 Cecilia    Next appointment:  10/23/24 Tigist JUAN                         [address] [address]                   Phone# [phone]              Fax# [phone]      Date: 10/23/24    RE: Victorina Adamson            : 1950       Surgical/Procedural Clearance for:  Foraminectomy  Patient is at: LOW cardiovascular risk for this INTERMEDIATE risk procedure.           N/A hold Antiplatelet      Can hold Anticoagulant Apixaban for 3 days prior                     Is further cardiac workup is needed prior to the procedure?  No     Patient should continue Beta Blocker in the perioperative period.  Yes     Patient should resume antiplatelet/anticoagulation as soon as cleared by surgeon/procedure physician.  Yes       Thank You,    10/23/24 at 9:19 AM - Devyn Brooks MD

## 2024-10-23 ENCOUNTER — LAB (OUTPATIENT)
Dept: LAB | Facility: LAB | Age: 74
End: 2024-10-23
Payer: MEDICARE

## 2024-10-23 ENCOUNTER — TELEPHONE (OUTPATIENT)
Dept: CARDIOLOGY | Facility: HOSPITAL | Age: 74
End: 2024-10-23

## 2024-10-23 ENCOUNTER — APPOINTMENT (OUTPATIENT)
Dept: CARDIOLOGY | Facility: CLINIC | Age: 74
End: 2024-10-23
Payer: MEDICARE

## 2024-10-23 VITALS
DIASTOLIC BLOOD PRESSURE: 62 MMHG | BODY MASS INDEX: 34.96 KG/M2 | HEART RATE: 75 BPM | HEIGHT: 62 IN | OXYGEN SATURATION: 96 % | WEIGHT: 190 LBS | SYSTOLIC BLOOD PRESSURE: 126 MMHG

## 2024-10-23 DIAGNOSIS — I50.22 CHRONIC SYSTOLIC HEART FAILURE: ICD-10-CM

## 2024-10-23 DIAGNOSIS — Z01.810 PREOPERATIVE CARDIOVASCULAR EXAMINATION: Primary | ICD-10-CM

## 2024-10-23 DIAGNOSIS — R53.82 CHRONIC FATIGUE: ICD-10-CM

## 2024-10-23 DIAGNOSIS — I42.0 DILATED CARDIOMYOPATHY (MULTI): ICD-10-CM

## 2024-10-23 DIAGNOSIS — I10 ESSENTIAL HYPERTENSION: ICD-10-CM

## 2024-10-23 DIAGNOSIS — I25.10 CAD IN NATIVE ARTERY: ICD-10-CM

## 2024-10-23 DIAGNOSIS — I50.32 HEART FAILURE WITH IMPROVED EJECTION FRACTION (HFIMPEF): ICD-10-CM

## 2024-10-23 DIAGNOSIS — I25.10 ARTERIOSCLEROSIS OF CORONARY ARTERY: ICD-10-CM

## 2024-10-23 DIAGNOSIS — I42.8 OTHER CARDIOMYOPATHY: ICD-10-CM

## 2024-10-23 DIAGNOSIS — I48.0 PAROXYSMAL ATRIAL FIBRILLATION (MULTI): Chronic | ICD-10-CM

## 2024-10-23 DIAGNOSIS — E78.5 DYSLIPIDEMIA: ICD-10-CM

## 2024-10-23 DIAGNOSIS — Z95.1 S/P CABG X 4: ICD-10-CM

## 2024-10-23 DIAGNOSIS — R19.7 DIARRHEA, UNSPECIFIED TYPE: ICD-10-CM

## 2024-10-23 LAB
ANION GAP SERPL CALC-SCNC: 14 MMOL/L (ref 10–20)
BNP SERPL-MCNC: 224 PG/ML (ref 0–99)
BUN SERPL-MCNC: 14 MG/DL (ref 6–23)
CALCIUM SERPL-MCNC: 7.8 MG/DL (ref 8.6–10.3)
CHLORIDE SERPL-SCNC: 104 MMOL/L (ref 98–107)
CO2 SERPL-SCNC: 30 MMOL/L (ref 21–32)
CREAT SERPL-MCNC: 0.83 MG/DL (ref 0.5–1.05)
EGFRCR SERPLBLD CKD-EPI 2021: 74 ML/MIN/1.73M*2
GLUCOSE SERPL-MCNC: 87 MG/DL (ref 74–99)
POTASSIUM SERPL-SCNC: 3.5 MMOL/L (ref 3.5–5.3)
SODIUM SERPL-SCNC: 144 MMOL/L (ref 136–145)

## 2024-10-23 PROCEDURE — 99214 OFFICE O/P EST MOD 30 MIN: CPT | Performed by: NURSE PRACTITIONER

## 2024-10-23 PROCEDURE — 36415 COLL VENOUS BLD VENIPUNCTURE: CPT

## 2024-10-23 PROCEDURE — 93010 ELECTROCARDIOGRAM REPORT: CPT | Performed by: INTERNAL MEDICINE

## 2024-10-23 PROCEDURE — 83880 ASSAY OF NATRIURETIC PEPTIDE: CPT

## 2024-10-23 PROCEDURE — 93005 ELECTROCARDIOGRAM TRACING: CPT | Performed by: NURSE PRACTITIONER

## 2024-10-23 PROCEDURE — 80048 BASIC METABOLIC PNL TOTAL CA: CPT

## 2024-10-23 RX ORDER — FUROSEMIDE 20 MG/1
TABLET ORAL
Qty: 95 TABLET | Refills: 0 | Status: SHIPPED | OUTPATIENT
Start: 2024-10-23 | End: 2025-01-21

## 2024-10-23 RX ORDER — CARVEDILOL 6.25 MG/1
6.25 TABLET ORAL
Qty: 180 TABLET | Refills: 3 | Status: SHIPPED | OUTPATIENT
Start: 2024-10-23

## 2024-10-23 RX ORDER — EZETIMIBE 10 MG/1
10 TABLET ORAL DAILY
Qty: 90 TABLET | Refills: 3 | Status: SHIPPED | OUTPATIENT
Start: 2024-10-23 | End: 2025-10-18

## 2024-10-23 RX ORDER — SPIRONOLACTONE 25 MG/1
12.5 TABLET ORAL DAILY
Qty: 45 TABLET | Refills: 0 | Status: SHIPPED | OUTPATIENT
Start: 2024-10-23 | End: 2025-01-21

## 2024-10-23 RX ORDER — LOSARTAN POTASSIUM 25 MG/1
25 TABLET ORAL DAILY
Qty: 90 TABLET | Refills: 2 | Status: SHIPPED | OUTPATIENT
Start: 2024-10-23 | End: 2025-07-20

## 2024-10-23 SDOH — ECONOMIC STABILITY: FOOD INSECURITY: WITHIN THE PAST 12 MONTHS, THE FOOD YOU BOUGHT JUST DIDN'T LAST AND YOU DIDN'T HAVE MONEY TO GET MORE.: OFTEN TRUE

## 2024-10-23 SDOH — ECONOMIC STABILITY: FOOD INSECURITY: WITHIN THE PAST 12 MONTHS, YOU WORRIED THAT YOUR FOOD WOULD RUN OUT BEFORE YOU GOT MONEY TO BUY MORE.: OFTEN TRUE

## 2024-10-23 ASSESSMENT — ENCOUNTER SYMPTOMS
BACK PAIN: 1
DIARRHEA: 1
DIZZINESS: 1
DYSPNEA ON EXERTION: 1

## 2024-10-23 NOTE — TELEPHONE ENCOUNTER
Result Communication    Resulted Orders   Basic Metabolic Panel   Result Value Ref Range    Glucose 87 74 - 99 mg/dL    Sodium 144 136 - 145 mmol/L    Potassium 3.5 3.5 - 5.3 mmol/L    Chloride 104 98 - 107 mmol/L    Bicarbonate 30 21 - 32 mmol/L    Anion Gap 14 10 - 20 mmol/L    Urea Nitrogen 14 6 - 23 mg/dL    Creatinine 0.83 0.50 - 1.05 mg/dL    eGFR 74 >60 mL/min/1.73m*2      Comment:      Calculations of estimated GFR are performed using the 2021 CKD-EPI Study Refit equation without the race variable for the IDMS-Traceable creatinine methods.  https://jasn.asnjournals.org/content/early/2021/09/22/ASN.5737896806    Calcium 7.8 (L) 8.6 - 10.3 mg/dL   B-Type Natriuretic Peptide   Result Value Ref Range     (H) 0 - 99 pg/mL    Narrative       <100 pg/mL - Heart failure unlikely  100-299 pg/mL - Intermediate probability of acute heart                  failure exacerbation. Correlate with clinical                  context and patient history.    >=300 pg/mL - Heart Failure likely. Correlate with clinical                  context and patient history.    BNP testing is performed using different testing methodology at [facility] than at other [facility] hospitals. Direct result comparisons should only be made within the same method.          2:14 PM      Results were successfully communicated with the patient and they acknowledged their understanding.    Increase furosemide 40 mg daily x5 days, then go back to 20 mg daily.  Start spironolactone 12.5 mg daily.  Repeat BMP in 1 week. Patient verbalized understanding.

## 2024-10-23 NOTE — PATIENT INSTRUCTIONS
Recommend Mediterranean style of eating.  Less than 2,000 mg sodium per day  Continue current medications  Check lab work  I have ordered a referral to GI  I have ordered a referral to  clinical pharmacy regarding cost of apixaban and empagliflozin   Follow-up with YESSICA Escoto or Dr. Devyn Brooks in 2 month  If you have any questions or cardiac concerns, please call our office at [phone].

## 2024-10-28 ENCOUNTER — TELEPHONE (OUTPATIENT)
Dept: PRIMARY CARE | Facility: CLINIC | Age: 74
End: 2024-10-28
Payer: MEDICARE

## 2024-10-28 DIAGNOSIS — E87.6 HYPOKALEMIA: Primary | ICD-10-CM

## 2024-10-28 RX ORDER — POTASSIUM CHLORIDE 20 MEQ/1
TABLET, EXTENDED RELEASE ORAL
Qty: 21 TABLET | Refills: 0 | Status: SHIPPED | OUTPATIENT
Start: 2024-10-28 | End: 2024-11-11

## 2024-11-05 ENCOUNTER — LAB (OUTPATIENT)
Dept: LAB | Facility: LAB | Age: 74
End: 2024-11-05
Payer: MEDICARE

## 2024-11-05 DIAGNOSIS — E87.6 HYPOKALEMIA: ICD-10-CM

## 2024-11-05 LAB
ANION GAP SERPL CALC-SCNC: 14 MMOL/L (ref 10–20)
BUN SERPL-MCNC: 12 MG/DL (ref 6–23)
CALCIUM SERPL-MCNC: 8.3 MG/DL (ref 8.6–10.3)
CHLORIDE SERPL-SCNC: 107 MMOL/L (ref 98–107)
CO2 SERPL-SCNC: 26 MMOL/L (ref 21–32)
CREAT SERPL-MCNC: 0.87 MG/DL (ref 0.5–1.05)
EGFRCR SERPLBLD CKD-EPI 2021: 70 ML/MIN/1.73M*2
GLUCOSE SERPL-MCNC: 102 MG/DL (ref 74–99)
POTASSIUM SERPL-SCNC: 4.3 MMOL/L (ref 3.5–5.3)
SODIUM SERPL-SCNC: 143 MMOL/L (ref 136–145)

## 2024-11-05 PROCEDURE — 36415 COLL VENOUS BLD VENIPUNCTURE: CPT

## 2024-11-05 PROCEDURE — 80048 BASIC METABOLIC PNL TOTAL CA: CPT

## 2024-11-06 ENCOUNTER — TELEPHONE (OUTPATIENT)
Dept: PRIMARY CARE | Facility: CLINIC | Age: 74
End: 2024-11-06
Payer: MEDICARE

## 2024-11-08 ENCOUNTER — TELEPHONE (OUTPATIENT)
Dept: PRIMARY CARE | Facility: CLINIC | Age: 74
End: 2024-11-08
Payer: MEDICARE

## 2024-11-08 NOTE — TELEPHONE ENCOUNTER
Aleena from [facility] calling--Is it okay to re-schedule surgery ?  [phone]    (Was cancelled due to low potassium)

## 2024-11-27 ENCOUNTER — APPOINTMENT (OUTPATIENT)
Dept: PHARMACY | Facility: HOSPITAL | Age: 74
End: 2024-11-27
Payer: MEDICARE

## 2024-12-02 ENCOUNTER — APPOINTMENT (OUTPATIENT)
Dept: PRIMARY CARE | Facility: CLINIC | Age: 74
End: 2024-12-02
Payer: MEDICARE

## 2024-12-02 VITALS
SYSTOLIC BLOOD PRESSURE: 124 MMHG | BODY MASS INDEX: 33.86 KG/M2 | OXYGEN SATURATION: 98 % | WEIGHT: 184 LBS | DIASTOLIC BLOOD PRESSURE: 72 MMHG | HEART RATE: 85 BPM | HEIGHT: 62 IN

## 2024-12-02 DIAGNOSIS — E78.5 DM TYPE 2 WITH DIABETIC DYSLIPIDEMIA (MULTI): Chronic | ICD-10-CM

## 2024-12-02 DIAGNOSIS — L30.9 DERMATITIS: ICD-10-CM

## 2024-12-02 DIAGNOSIS — I10 ESSENTIAL HYPERTENSION: ICD-10-CM

## 2024-12-02 DIAGNOSIS — G47.33 OBSTRUCTIVE SLEEP APNEA (ADULT) (PEDIATRIC): ICD-10-CM

## 2024-12-02 DIAGNOSIS — Z00.00 HEALTH CARE MAINTENANCE: ICD-10-CM

## 2024-12-02 DIAGNOSIS — E11.69 DM TYPE 2 WITH DIABETIC DYSLIPIDEMIA (MULTI): Chronic | ICD-10-CM

## 2024-12-02 DIAGNOSIS — E66.01 OBESITY, MORBID (MULTI): ICD-10-CM

## 2024-12-02 DIAGNOSIS — E11.69 TYPE 2 DIABETES MELLITUS WITH OTHER SPECIFIED COMPLICATION, WITHOUT LONG-TERM CURRENT USE OF INSULIN: Primary | ICD-10-CM

## 2024-12-02 LAB — POC HEMOGLOBIN A1C: 7 % (ref 4.2–6.5)

## 2024-12-02 PROCEDURE — 1036F TOBACCO NON-USER: CPT

## 2024-12-02 PROCEDURE — 1160F RVW MEDS BY RX/DR IN RCRD: CPT

## 2024-12-02 PROCEDURE — 1157F ADVNC CARE PLAN IN RCRD: CPT

## 2024-12-02 PROCEDURE — 99213 OFFICE O/P EST LOW 20 MIN: CPT

## 2024-12-02 PROCEDURE — 3048F LDL-C <100 MG/DL: CPT

## 2024-12-02 PROCEDURE — G2211 COMPLEX E/M VISIT ADD ON: HCPCS

## 2024-12-02 PROCEDURE — 3074F SYST BP LT 130 MM HG: CPT

## 2024-12-02 PROCEDURE — 1159F MED LIST DOCD IN RCRD: CPT

## 2024-12-02 PROCEDURE — 1123F ACP DISCUSS/DSCN MKR DOCD: CPT

## 2024-12-02 PROCEDURE — 3078F DIAST BP <80 MM HG: CPT

## 2024-12-02 PROCEDURE — 3046F HEMOGLOBIN A1C LEVEL >9.0%: CPT

## 2024-12-02 PROCEDURE — 4010F ACE/ARB THERAPY RXD/TAKEN: CPT

## 2024-12-02 PROCEDURE — 83036 HEMOGLOBIN GLYCOSYLATED A1C: CPT

## 2024-12-02 RX ORDER — AMMONIUM LACTATE 12 G/100G
LOTION TOPICAL AS NEEDED
Qty: 2258 G | Refills: 2 | Status: SHIPPED | OUTPATIENT
Start: 2024-12-02 | End: 2025-12-02

## 2024-12-02 RX ORDER — TRIAMCINOLONE ACETONIDE 1 MG/G
CREAM TOPICAL 2 TIMES DAILY
Qty: 30 G | Refills: 2 | Status: SHIPPED | OUTPATIENT
Start: 2024-12-02 | End: 2025-12-02

## 2024-12-02 ASSESSMENT — ENCOUNTER SYMPTOMS
RESPIRATORY NEGATIVE: 1
HEMATOLOGIC/LYMPHATIC NEGATIVE: 1
PSYCHIATRIC NEGATIVE: 1
EYES NEGATIVE: 1
OCCASIONAL FEELINGS OF UNSTEADINESS: 1
ENDOCRINE NEGATIVE: 1
ALLERGIC/IMMUNOLOGIC NEGATIVE: 1
LOSS OF SENSATION IN FEET: 0
GASTROINTESTINAL NEGATIVE: 1
CARDIOVASCULAR NEGATIVE: 1
CONSTITUTIONAL NEGATIVE: 1
DEPRESSION: 0
WOUND: 1

## 2024-12-02 NOTE — ASSESSMENT & PLAN NOTE
Check sugars once daily. Endorses diet has been better. A1c 7.1 today, last was 9.7.  Will increase gymperide to 4mg daily, dicussed with patient that this medication can cause hypoglycemia, to monitor sugars closely and signs/symptoms of low blood sugars.   Continues on atorvastatin 80mg, lipid panel ordered today.

## 2024-12-02 NOTE — ASSESSMENT & PLAN NOTE
Assessed and well controlled on current medication regimen. BP in office today 124/72. Will repeat lab work at follow up.

## 2024-12-02 NOTE — PATIENT INSTRUCTIONS
Assessment/Plan   Problem List Items Addressed This Visit       Type 2 diabetes mellitus with other specified complication - Primary     Check sugars once daily. Endorses diet has been better. A1c 7.1 today, last was 9.7.  Will increase gymperide to 4mg daily, dicussed with patient that this medication can cause hypoglycemia, to monitor sugars closely and signs/symptoms of low blood sugars.   Continues on atorvastatin 80mg, lipid panel ordered today.         Relevant Orders    POCT glycosylated hemoglobin (Hb A1C) manually resulted (Completed)    Follow Up In [facility] - PCP - Established    Essential hypertension     Assessed and well controlled on current medication regimen. BP in office today 124/72. Will repeat lab work at follow up.          Obstructive sleep apnea (adult) (pediatric)     No currently using CPAP machine.          DM type 2 with diabetic dyslipidemia (Multi) (Chronic)     Check sugars once daily. Endorses diet has been better. A1c 7.1 today, last was 9.7.  Will increase gymperide to 4mg daily, dicussed with patient that this medication can cause hypoglycemia, to monitor sugars closely and signs/symptoms of low blood sugars.   Continues on atorvastatin 80mg, lipid panel ordered today.         Obesity, morbid (Multi)     Other Visit Diagnoses       Dermatitis        Relevant Medications    ammonium lactate (Lac-Hydrin) 12 % lotion    triamcinolone (Kenalog) 0.1 % cream    Health care maintenance        Relevant Orders    Follow Up In [facility] - PCP - Established

## 2024-12-02 NOTE — PROGRESS NOTES
"Subjective   Patient ID: Victorina Adamson is a 74 y.o. female who presents for Follow-up (Follow up, discuss rash on both ankles x couple weeks).    Past Medical, Surgical, and Family History reviewed and updated in chart.     Reviewed all medications by prescribing practitioner or clinical pharmacist (such as prescriptions, OTCs, herbal therapies and supplements) and documented in the medical record.    HPI   74 yof in office for follow up.     Has had rash to both ankles, that have been very itchy. Has been using Neutrogena lotion daily, Switches detergent back to unscented tide. States it is itching so badly she can't sleep at night.    11/19 had the L4 removed with orthopedics at [facility]. Staples in place, patient has open to air. States is doing well, back and legs are not hurting like they were.     A1c is 7 in office today.     Review of Systems   Constitutional: Negative.    HENT: Negative.     Eyes: Negative.    Respiratory: Negative.     Cardiovascular: Negative.    Gastrointestinal: Negative.    Endocrine: Negative.    Genitourinary: Negative.    Musculoskeletal:  Positive for gait problem.   Skin:  Positive for rash and wound.   Allergic/Immunologic: Negative.    Hematological: Negative.    Psychiatric/Behavioral: Negative.     All other systems reviewed and are negative.      Objective   /72   Pulse 85   Ht 1.575 m (5' 2.01\")   Wt 83.5 kg (184 lb)   SpO2 98%   BMI 33.65 kg/m²     Physical Exam  Constitutional:       Appearance: Normal appearance.   HENT:      Head: Normocephalic and atraumatic.      Nose: Nose normal.      Mouth/Throat:      Mouth: Mucous membranes are moist.      Pharynx: Oropharynx is clear.   Eyes:      Pupils: Pupils are equal, round, and reactive to light.   Cardiovascular:      Rate and Rhythm: Normal rate and regular rhythm.      Pulses: Normal pulses.      Heart sounds: Normal heart sounds.   Pulmonary:      Effort: Pulmonary effort is normal.      Breath " sounds: Normal breath sounds.   Musculoskeletal:         General: Normal range of motion.      Cervical back: Normal range of motion.   Skin:     General: Skin is warm and dry.      Findings: Rash present.             Comments: Staples in place, well approximated, slight redness to incision line, no swelling or drainage noted.     Bilateral ankles skin is dry, with scratches.    Neurological:      General: No focal deficit present.      Mental Status: She is alert and oriented to person, place, and time.   Psychiatric:         Mood and Affect: Mood normal.         Behavior: Behavior normal.         Thought Content: Thought content normal.         Judgment: Judgment normal.         Assessment/Plan   Problem List Items Addressed This Visit       Type 2 diabetes mellitus with other specified complication - Primary     Check sugars once daily. Endorses diet has been better. A1c 7.1 today, last was 9.7.  Will increase gymperide to 4mg daily, dicussed with patient that this medication can cause hypoglycemia, to monitor sugars closely and signs/symptoms of low blood sugars.   Continues on atorvastatin 80mg, lipid panel ordered today.         Relevant Orders    POCT glycosylated hemoglobin (Hb A1C) manually resulted (Completed)    Follow Up In [facility] - PCP - Established    Essential hypertension     Assessed and well controlled on current medication regimen. BP in office today 124/72. Will repeat lab work at follow up.          Obstructive sleep apnea (adult) (pediatric)     No currently using CPAP machine.          DM type 2 with diabetic dyslipidemia (Multi) (Chronic)     Check sugars once daily. Endorses diet has been better. A1c 7.1 today, last was 9.7.  Will increase gymperide to 4mg daily, dicussed with patient that this medication can cause hypoglycemia, to monitor sugars closely and signs/symptoms of low blood sugars.   Continues on atorvastatin 80mg, lipid panel ordered today.         Obesity, morbid  (Multi)     Other Visit Diagnoses       Dermatitis        Relevant Medications    ammonium lactate (Lac-Hydrin) 12 % lotion    triamcinolone (Kenalog) 0.1 % cream    Health care maintenance        Relevant Orders    Follow Up In [facility] - PCP - Established

## 2024-12-26 ASSESSMENT — ENCOUNTER SYMPTOMS
HEMATOCHEZIA: 0
BACK PAIN: 1
CHILLS: 0
HEMATURIA: 0
PALPITATIONS: 0
WHEEZING: 0
NAUSEA: 0
SYNCOPE: 0
DIARRHEA: 1
FEVER: 0
VOMITING: 0
ALTERED MENTAL STATUS: 0
DIZZINESS: 1
ORTHOPNEA: 0
IRREGULAR HEARTBEAT: 0
SHORTNESS OF BREATH: 0
NEAR-SYNCOPE: 0

## 2024-12-26 NOTE — PROGRESS NOTES
Chief Complaint/Reason for Visit:  Follow-up (Having SOB and chest  pain) 1 month cardiovascular follow up    History Of Present Illness:    Victorina Adamson is a 74 y.o. female that presents to the office for 1 month follow up.    Taking medications as prescribed.     PMH is significant for arthritis, CAD s/p CABG x4 (LIMA-LAD, SVG-OM1 distally and SVG sequentially-RCA and PDA) 04/21/2022, atrial fibrillation s/p DCC 05/17/2022, CHF, depression, DM, HTN, hyperlipidemia, hypothyroidism, SHARON and shingles.     She reports that she takes care of her 14 year old (who has autism) and 10 year old grandson.  She also lives with her two sons.    Ambulates with a walker.     During last office visit her furosemide was increased to 40 mg daily x5 days, then to go back to 20 mg daily d/t BLE edema. Start spironolactone 12.5 mg daily. Repeat BMP in 1 week.     She reports that for the past week that she has had cough, fatigue and PEREZ.  Her grandson has also been sick.      Past Medical History:  She has a past medical history of Acute upper respiratory infection, unspecified (02/11/2022), Anxiety disorder, unspecified, Chronic tension-type headache, not intractable, Diverticulosis of intestine, part unspecified, without perforation or abscess without bleeding, Encounter for general adult medical examination without abnormal findings (09/20/2021), Encounter for other screening for malignant neoplasm of breast (02/11/2021), Encounter for preprocedural cardiovascular examination, Encounter for screening for malignant neoplasm of colon (02/11/2021), Lumbago with sciatica, right side (09/20/2021), Major depressive disorder, single episode, mild (CMS-HCC) (09/20/2021), Obesity, unspecified (06/22/2020), Other intervertebral disc degeneration, lumbar region (03/15/2022), Other specified health status (06/22/2020), Other unilateral secondary osteoarthritis of hip, Pain in right hip (10/02/2021), Personal history of other diseases of  the digestive system, Personal history of other diseases of the digestive system, Personal history of other diseases of the musculoskeletal system and connective tissue (06/08/2021), Personal history of other diseases of the nervous system and sense organs (03/28/2022), Personal history of other infectious and parasitic diseases (06/08/2021), Personal history of other infectious and parasitic diseases (06/18/2020), Personal history of other mental and behavioral disorders (03/15/2022), Personal history of other specified conditions, Personal history of other specified conditions (02/11/2021), Personal history of other specified conditions (06/22/2020), Spinal stenosis, lumbosacral region (11/23/2021), Spondylosis without myelopathy or radiculopathy, lumbar region (11/30/2021), and Unspecified fall, subsequent encounter (09/20/2021).    Past Surgical History:  She has a past surgical history that includes Other surgical history (06/17/2020); Other surgical history (06/17/2020); Other surgical history (06/17/2020); Other surgical history (05/19/2022); Other surgical history (05/26/2022); Other surgical history (06/01/2022); Other surgical history (03/15/2022); Other surgical history (02/11/2021); Other surgical history (10/18/2022); Other surgical history (09/20/2021); and IR injection joint (10/13/2021).      Social History:  She reports that she has never smoked. She has never used smokeless tobacco. She reports current alcohol use. She reports that she does not use drugs.    Family History:  Family History   Problem Relation Name Age of Onset    Hypertension Mother          benign essential    Arthritis Mother      Coronary artery disease Mother      Depression Mother      Diabetes Mother      Other (substance abuse) Mother      Lung cancer Father      Other (substance abuse) Father      Diabetes Sister      Hypertension Brother          benign essential    Lung cancer Brother          Allergies:  Gabapentin,  Hydrocodone-acetaminophen, Influenza virus vaccines, Lisinopril, and Oxycodone    Review of Systems   Constitutional: Positive for malaise/fatigue. Negative for chills and fever.   Cardiovascular:  Positive for dyspnea on exertion (chronic). Negative for chest pain, irregular heartbeat, leg swelling, near-syncope, orthopnea, palpitations and syncope.   Respiratory:  Positive for cough. Negative for shortness of breath and wheezing.    Musculoskeletal:  Positive for back pain and joint pain (hip pain).   Gastrointestinal:  Positive for diarrhea (chronic). Negative for hematochezia, melena, nausea and vomiting.   Genitourinary:  Negative for hematuria.   Neurological:  Positive for dizziness (intermittent).   Psychiatric/Behavioral:  Negative for altered mental status.        Objective      Vitals reviewed.   Constitutional:       Appearance: Not in distress.   Neck:      Vascular: No carotid bruit.   Pulmonary:      Effort: Pulmonary effort is normal.      Breath sounds: Normal breath sounds.   Cardiovascular:      PMI at left midclavicular line. Normal rate. Regular rhythm. S1 with normal intensity. S2 with normal intensity.       Murmurs: There is no murmur.   Edema:     Peripheral edema absent.   Abdominal:      General: Bowel sounds are normal.   Neurological:      Mental Status: Alert and oriented to person, place and time.         Current Outpatient Medications   Medication Instructions    acetaminophen (TYLENOL) 1,000 mg, Every 6 hours PRN    albuterol 90 mcg/actuation inhaler 2 puffs, inhalation, Every 4 hours PRN    ammonium lactate (Lac-Hydrin) 12 % lotion Topical, As needed    apixaban (Eliquis) 5 mg tablet TAKE 1 TABLET BY MOUTH TWO TIMES A DAY    aspirin 81 mg, Daily    atorvastatin (LIPITOR) 80 mg, oral, Nightly    blood glucose control, low (True Metrix Level 1) solution 1 each, Topical, See admin instructions    carvedilol (COREG) 6.25 mg, oral, 2 times daily (morning and late afternoon)    DropSafe  Alcohol Prep Pads pads, medicated USE AS DIRECTED    empagliflozin (JARDIANCE) 25 mg, oral, Daily    ezetimibe (ZETIA) 10 mg, oral, Daily    furosemide (Lasix) 20 mg tablet Take 2 tablets (40 mg) by mouth once daily for 5 days, THEN 1 tablet (20 mg) once daily.    furosemide (LASIX) 20 mg, oral, Daily, Take an additional 20mg, with scheduled 10mg in the morning for 5 days for increase swelling to bilateral lower extremities.    glimepiride (AMARYL) 4 mg, oral, Daily before breakfast    levothyroxine (SYNTHROID, LEVOXYL) 50 mcg, oral, Daily    losartan (COZAAR) 25 mg, oral, Daily    meclizine (ANTIVERT) 25 mg, oral, 3 times daily PRN    metFORMIN (GLUCOPHAGE) 1,000 mg, oral    NON FORMULARY 2 each, Daily    NON FORMULARY 1 each, Daily    pantoprazole (PROTONIX) 40 mg, oral, Daily    pregabalin (LYRICA) 50 mg, oral, 2 times daily    sertraline (ZOLOFT) 100 mg, oral, Daily    spironolactone (ALDACTONE) 12.5 mg, oral, Daily    triamcinolone (Kenalog) 0.1 % cream Topical, 2 times daily, Apply to affected area 1-2 times daily as needed. Avoid face and groin.    True Metrix Air Glucose Meter kit     True Metrix Glucose Test Strip strip USE AS DIRECTED    TRUEplus Lancets 33 gauge misc USE AS DIRECTED        Reviewed the following Labs:  CBC -  Lab Results   Component Value Date    WBC 8.7 09/16/2024    HGB 9.2 (L) 09/16/2024    HCT 33.4 (L) 09/16/2024    MCV 78 (L) 09/16/2024     09/16/2024       RENAL FUNCTION PANEL -   Lab Results   Component Value Date    GLUCOSE 102 (H) 11/05/2024     11/05/2024    K 4.3 11/05/2024     11/05/2024    CO2 26 11/05/2024    ANIONGAP 14 11/05/2024    BUN 12 11/05/2024    CREATININE 0.87 11/05/2024    GFRMALE CANCELED 02/24/2023    CALCIUM 8.3 (L) 11/05/2024    PHOS 4.8 05/03/2022    ALBUMIN 4.0 04/23/2024        CMP -  Lab Results   Component Value Date    CALCIUM 8.3 (L) 11/05/2024    PHOS 4.8 05/03/2022    PROT 6.3 (L) 04/23/2024    ALBUMIN 4.0 04/23/2024    AST 16  "04/23/2024    ALT 23 04/23/2024    ALKPHOS 69 04/23/2024    BILITOT 0.4 04/23/2024       LIPID PANEL -   Lab Results   Component Value Date    CHOL 79 09/16/2024    TRIG 126 09/16/2024    HDL 38.8 09/16/2024    CHHDL 2.0 09/16/2024    LDLF 99 05/10/2023    VLDL 25 09/16/2024    NHDL 40 09/16/2024     Lab Results   Component Value Date    LDLCALC 15 09/16/2024       Lab Results   Component Value Date     (H) 10/23/2024    HGBA1C 7.0 (A) 12/02/2024       Lab Results   Component Value Date    TSH 5.77 (H) 09/16/2024       No results found for this or any previous visit.     Reviewed the following Cardiology Tests:    Echo 12/26/23:    1. Left ventricular systolic function is low normal with a 50-55% estimated ejection fraction.   2. Spectral Doppler shows a pseudonormal pattern of left ventricular diastolic filling.   3. There are elevated left atrial and left ventricular end diastolic pressures.   4. There is low normal right ventricular systolic function.   5. Mildly elevated RVSP.    TTE 1/18/2023 showed LV systolic function is moderately decreased with an EF of 35%. Abnormal pattern of LV diastolic filling. Mild to moderate MR. Moderate tricuspid regurgitation.     LHC 12/1/2022 showed patent LIMA to LAD and SVG to circumflex/OM. Occluded SVG to RCA with nonhemodynamically significant disease of the RCA.     Carotid artery duplex 4/6/2022 showed findings are consistent with less than 50% stenosis of bilateral proximal ICA.    Visit Vitals  /62 (BP Location: Left arm)   Pulse 81   Ht 1.575 m (5' 2\")   Wt 83.9 kg (185 lb)   SpO2 98%   BMI 33.84 kg/m²   OB Status Postmenopausal   Smoking Status Never   BSA 1.92 m²       Assessment/Plan   The primary encounter diagnosis was Arteriosclerosis of coronary artery. Diagnoses of Paroxysmal atrial fibrillation (Multi), Heart failure with improved ejection fraction (HFimpEF), Dyslipidemia, and Chronic systolic heart failure were also pertinent to this visit.    1. " CAD s/p CABG April 2022  Continue aspirin 81 mg daily, carvedilol 6.25 mg twice daily and atorvastatin 80 mg daily  C December 2022 with patent LIMA to LAD and SVG to the circumflex/OM, but occluded SVG to RCA  TTE January 2023 with LVEF 35%  TTE Dec 2023 with LVEF 50-55%     2. Paroxysmal afib  Hypertension - Yes, 1 point, Diabetes - Yes, 1 point, Congestive Heart Failure  or Left ventricular Dysfunction - Yes, 1 point, Female - Yes, 1 point, and Age over 75 years - 2 points  [id number] score is 6.   Continue apixaban 5 mg BID    Continue carvedilol 6.25 mg twice daily  Referral entered for  clinical pharmacy regarding cost of apixaban (and empagliflozin) - marked as no show for appt on 11/27/24 and has another appt scheduled 1/2/25.     3. Chronic systolic heart failure - EF improved  She is not volume overloaded on exam.  BLE edema has resolved.  Continue current medications which include:  Beta blocker: Carvedilol 6.25 mg BID  ACE inhibitor/ARB/ARNI: Losartan 25 mg daily Entresto was too expensive.  Lisinopril caused cough.  MRA: spironolactone 12.5 mg daily  SGLT2i:  empagliflozin 25 mg daily (for DM)  Diuretic: Furosemide 20 mg daily   Hydralazine/Nitrate: None  Device: None  TTE January 2023 with LVEF 35%  Repeat TTE Dec 2023 with LVEF 50-55%  Educated regarding 2,000 mg sodium diet as she admits to eating salted pretzels  BNP 10/23/24 was 224  Check BMP in 1 month  Check echocardiogram    4. Dyslipidemia  Goal LDL less than 70. She is at goal.  Continue atorvastatin 80 mg daily  Continue ezetimibe 10 mg daily    5. Diarrhea, anemia, fatigue  She states she had had diarrhea for months.  Denies any blood in stool.  Last colonoscopy ~10 years ago  Refer to GI ordered Jan 2024. New referral ordered Oct 2024 as she reported ongoing diarrhea    6. Cough, fatigue, PEREZ for 1 week  Check echocardiogram  Recommend evaluation by PCP vs urgent care.  She has been around her grandson who has also had the same  symptoms.  Recommend ED if symptoms worsen.  She verbalized understanding.           Tigist Calle, APRN-CNP

## 2024-12-26 NOTE — PATIENT INSTRUCTIONS
Recommend Mediterranean style of eating  Continue current medications  Check lab work in 1 month for your kidney function  Check echocardiogram  Recommend evaluation by PCP or urgent care for your cough, fatigue etc.  Follow-up with Dr. Brooks in 3 months  If you have any questions or cardiac concerns, please call our office at [phone].

## 2024-12-27 ENCOUNTER — TELEPHONE (OUTPATIENT)
Dept: CARDIOLOGY | Facility: HOSPITAL | Age: 74
End: 2024-12-27
Payer: MEDICARE

## 2024-12-27 NOTE — TELEPHONE ENCOUNTER
Called and left voicemail for patient with reminder for appointment and to bring a list of medications and any refills needed,

## 2024-12-29 DIAGNOSIS — Z76.0 MEDICATION REFILL: ICD-10-CM

## 2024-12-30 ENCOUNTER — OFFICE VISIT (OUTPATIENT)
Dept: CARDIOLOGY | Facility: HOSPITAL | Age: 74
End: 2024-12-30
Payer: MEDICARE

## 2024-12-30 VITALS
DIASTOLIC BLOOD PRESSURE: 62 MMHG | SYSTOLIC BLOOD PRESSURE: 128 MMHG | HEART RATE: 81 BPM | HEIGHT: 62 IN | OXYGEN SATURATION: 98 % | BODY MASS INDEX: 34.04 KG/M2 | WEIGHT: 185 LBS

## 2024-12-30 DIAGNOSIS — E78.5 DYSLIPIDEMIA: ICD-10-CM

## 2024-12-30 DIAGNOSIS — I48.0 PAROXYSMAL ATRIAL FIBRILLATION (MULTI): Chronic | ICD-10-CM

## 2024-12-30 DIAGNOSIS — I25.10 ARTERIOSCLEROSIS OF CORONARY ARTERY: Primary | ICD-10-CM

## 2024-12-30 DIAGNOSIS — I50.22 CHRONIC SYSTOLIC HEART FAILURE: ICD-10-CM

## 2024-12-30 DIAGNOSIS — I50.32 HEART FAILURE WITH IMPROVED EJECTION FRACTION (HFIMPEF): ICD-10-CM

## 2024-12-30 DIAGNOSIS — R06.02 SHORTNESS OF BREATH: ICD-10-CM

## 2024-12-30 PROCEDURE — 3074F SYST BP LT 130 MM HG: CPT | Performed by: NURSE PRACTITIONER

## 2024-12-30 PROCEDURE — 3078F DIAST BP <80 MM HG: CPT | Performed by: NURSE PRACTITIONER

## 2024-12-30 PROCEDURE — 1160F RVW MEDS BY RX/DR IN RCRD: CPT | Performed by: NURSE PRACTITIONER

## 2024-12-30 PROCEDURE — 1036F TOBACCO NON-USER: CPT | Performed by: NURSE PRACTITIONER

## 2024-12-30 PROCEDURE — 99214 OFFICE O/P EST MOD 30 MIN: CPT | Performed by: NURSE PRACTITIONER

## 2024-12-30 PROCEDURE — 1159F MED LIST DOCD IN RCRD: CPT | Performed by: NURSE PRACTITIONER

## 2024-12-30 PROCEDURE — 3046F HEMOGLOBIN A1C LEVEL >9.0%: CPT | Performed by: NURSE PRACTITIONER

## 2024-12-30 PROCEDURE — 1123F ACP DISCUSS/DSCN MKR DOCD: CPT | Performed by: NURSE PRACTITIONER

## 2024-12-30 PROCEDURE — 3048F LDL-C <100 MG/DL: CPT | Performed by: NURSE PRACTITIONER

## 2024-12-30 PROCEDURE — 4010F ACE/ARB THERAPY RXD/TAKEN: CPT | Performed by: NURSE PRACTITIONER

## 2024-12-30 PROCEDURE — 1157F ADVNC CARE PLAN IN RCRD: CPT | Performed by: NURSE PRACTITIONER

## 2024-12-30 RX ORDER — SPIRONOLACTONE 25 MG/1
12.5 TABLET ORAL DAILY
Qty: 45 TABLET | Refills: 1 | Status: SHIPPED | OUTPATIENT
Start: 2024-12-30 | End: 2025-06-28

## 2024-12-30 RX ORDER — FUROSEMIDE 20 MG/1
20 TABLET ORAL DAILY
Qty: 90 TABLET | Refills: 1 | Status: SHIPPED | OUTPATIENT
Start: 2024-12-30 | End: 2025-06-28

## 2024-12-30 ASSESSMENT — ENCOUNTER SYMPTOMS
LOSS OF SENSATION IN FEET: 0
OCCASIONAL FEELINGS OF UNSTEADINESS: 1
DEPRESSION: 0
COUGH: 1
DYSPNEA ON EXERTION: 1

## 2025-01-02 ENCOUNTER — APPOINTMENT (OUTPATIENT)
Dept: PHARMACY | Facility: HOSPITAL | Age: 75
End: 2025-01-02
Payer: MEDICARE

## 2025-01-02 DIAGNOSIS — I48.0 PAROXYSMAL ATRIAL FIBRILLATION (MULTI): ICD-10-CM

## 2025-01-02 DIAGNOSIS — I50.32 HEART FAILURE WITH IMPROVED EJECTION FRACTION (HFIMPEF): ICD-10-CM

## 2025-01-02 NOTE — PROGRESS NOTES
Pharmacist Clinic: Cardiology Management    Victorina Adamson is a 74 y.o. female was referred to Clinical Pharmacy Team for anticoagulation and heart failure management.     Referring Provider: Tigist Calle APRN*    THIS IS A NEW PATIENT APPOINTMENT. PATIENT WILL BE ESTABLISHING CARE WITH CLINICAL PHARMACY.    Appointment was completed by Victorina who was reached at primary number.    Allergies Reviewed? Yes    Allergies   Allergen Reactions    Gabapentin Unknown    Hydrocodone-Acetaminophen Unknown    Influenza Virus Vaccines Unknown    Lisinopril Cough    Oxycodone Unknown       Past Medical History:   Diagnosis Date    Acute upper respiratory infection, unspecified 02/11/2022    URI, acute    Anxiety disorder, unspecified     Anxiety and depression    Chronic tension-type headache, not intractable     Chronic tension headaches    Diverticulosis of intestine, part unspecified, without perforation or abscess without bleeding     Diverticulosis    Encounter for general adult medical examination without abnormal findings 09/20/2021    Medicare annual wellness visit, subsequent    Encounter for other screening for malignant neoplasm of breast 02/11/2021    Breast cancer screening    Encounter for preprocedural cardiovascular examination     Pre-operative cardiovascular examination    Encounter for screening for malignant neoplasm of colon 02/11/2021    Colon cancer screening    Lumbago with sciatica, right side 09/20/2021    Acute right-sided low back pain with right-sided sciatica    Major depressive disorder, single episode, mild (CMS-HCC) 09/20/2021    Depression, major, single episode, mild    Obesity, unspecified 06/22/2020    Obesity (BMI 30-39.9)    Other intervertebral disc degeneration, lumbar region 03/15/2022    Degeneration of intervertebral disc of lumbar region    Other specified health status 06/22/2020    Intolerance of continuous positive airway pressure (CPAP) ventilation    Other  unilateral secondary osteoarthritis of hip     Other secondary osteoarthritis of right hip    Pain in right hip 10/02/2021    Hip pain, right    Personal history of other diseases of the digestive system     History of gastroesophageal reflux (GERD)    Personal history of other diseases of the digestive system     History of diverticulitis    Personal history of other diseases of the musculoskeletal system and connective tissue 06/08/2021    History of arthritis    Personal history of other diseases of the nervous system and sense organs 03/28/2022    History of acute otitis media    Personal history of other infectious and parasitic diseases 06/08/2021    History of candidiasis of vagina    Personal history of other infectious and parasitic diseases 06/18/2020    History of herpes zoster    Personal history of other mental and behavioral disorders 03/15/2022    History of depression    Personal history of other specified conditions     History of vertigo    Personal history of other specified conditions 02/11/2021    History of dysphagia    Personal history of other specified conditions 06/22/2020    History of insomnia    Spinal stenosis, lumbosacral region 11/23/2021    Spinal stenosis of lumbosacral region    Spondylosis without myelopathy or radiculopathy, lumbar region 11/30/2021    Lumbar spondylosis    Unspecified fall, subsequent encounter 09/20/2021    Fall at home, subsequent encounter       Current Outpatient Medications on File Prior to Visit   Medication Sig Dispense Refill    acetaminophen (Tylenol) 500 mg tablet Take 2 tablets (1,000 mg) by mouth every 6 hours if needed for mild pain (1 - 3).      albuterol 90 mcg/actuation inhaler Inhale 2 puffs every 4 hours if needed for wheezing or shortness of breath (inhale 2 puffs into the lungs every 4 to 6 hours as needed). 18 g 11    ammonium lactate (Lac-Hydrin) 12 % lotion Apply topically if needed for dry skin. 2258 g 2    apixaban (Eliquis) 5 mg tablet  TAKE 1 TABLET BY MOUTH TWO TIMES A  tablet 2    aspirin 81 mg EC tablet Take 1 tablet (81 mg) by mouth once daily.      atorvastatin (Lipitor) 80 mg tablet TAKE 1 TABLET BY MOUTH AT BEDTIME 90 tablet 3    blood glucose control, low (True Metrix Level 1) solution Apply 1 each topically see administration instructions. 1 each 0    carvedilol (Coreg) 6.25 mg tablet Take 1 tablet (6.25 mg) by mouth 2 times daily (morning and late afternoon). 180 tablet 3    DropSafe Alcohol Prep Pads pads, medicated USE AS DIRECTED 100 each 10    ezetimibe (Zetia) 10 mg tablet Take 1 tablet (10 mg) by mouth once daily. 90 tablet 3    furosemide (Lasix) 20 mg tablet Take 1 tablet (20 mg) by mouth once daily. 90 tablet 1    glimepiride (AmaryL) 4 mg tablet Take 1 tablet (4 mg) by mouth once daily in the morning. Take before meals. 30 tablet 11    levothyroxine (Synthroid, Levoxyl) 50 mcg tablet Take 1 tablet (50 mcg) by mouth once daily. 90 tablet 3    losartan (Cozaar) 25 mg tablet Take 1 tablet (25 mg) by mouth once daily. 90 tablet 2    meclizine (Antivert) 12.5 mg tablet Take 2 tablets (25 mg) by mouth 3 times a day as needed for dizziness. 90 tablet 11    metFORMIN (Glucophage) 1,000 mg tablet TAKE 1 TABLET BY MOUTH IN THE MORNING AND AT BEDTIME 180 tablet 3    pantoprazole (ProtoNix) 40 mg EC tablet Take 1 tablet (40 mg) by mouth once daily. 90 tablet 1    pregabalin (Lyrica) 50 mg capsule Take 1 capsule (50 mg) by mouth 2 times a day. 180 capsule 1    sertraline (Zoloft) 100 mg tablet Take 1 tablet (100 mg) by mouth once daily. 90 tablet 1    spironolactone (Aldactone) 25 mg tablet Take 0.5 tablets (12.5 mg) by mouth once daily. 45 tablet 1    triamcinolone (Kenalog) 0.1 % cream Apply topically 2 times a day. Apply to affected area 1-2 times daily as needed. Avoid face and groin. 30 g 2    True Metrix Air Glucose Meter kit       True Metrix Glucose Test Strip strip USE AS DIRECTED 200 strip 10    TRUEplus Lancets 33 gauge  "misc USE AS DIRECTED 200 each 10    empagliflozin (Jardiance) 25 mg Take 1 tablet (25 mg) by mouth once daily. 90 tablet 3    [DISCONTINUED] furosemide (Lasix) 20 mg tablet Take 1 tablet (20 mg) by mouth once daily for 5 days. Take an additional 20mg, with scheduled 10mg in the morning for 5 days for increase swelling to bilateral lower extremities. 5 tablet 0    [DISCONTINUED] furosemide (Lasix) 20 mg tablet Take 2 tablets (40 mg) by mouth once daily for 5 days, THEN 1 tablet (20 mg) once daily. 95 tablet 0    [DISCONTINUED] NON FORMULARY Take 2 each by mouth once daily. UMARY supplement      [DISCONTINUED] NON FORMULARY Take 1 each by mouth once daily. Yeast  rELIEF      [DISCONTINUED] spironolactone (Aldactone) 25 mg tablet Take 0.5 tablets (12.5 mg) by mouth once daily. 45 tablet 0     No current facility-administered medications on file prior to visit.         RELEVANT LAB RESULTS:  Lab Results   Component Value Date    BILITOT 0.4 04/23/2024    CALCIUM 8.3 (L) 11/05/2024    CO2 26 11/05/2024     11/05/2024    CREATININE 0.87 11/05/2024    GLUCOSE 102 (H) 11/05/2024    ALKPHOS 69 04/23/2024    K 4.3 11/05/2024    PROT 6.3 (L) 04/23/2024     11/05/2024    AST 16 04/23/2024    ALT 23 04/23/2024    BUN 12 11/05/2024    ANIONGAP 14 11/05/2024    MG 1.47 (L) 03/16/2023    PHOS 4.8 05/03/2022    ALBUMIN 4.0 04/23/2024    GFRF 78 05/10/2023    GFRMALE CANCELED 02/24/2023     Lab Results   Component Value Date    TRIG 126 09/16/2024    CHOL 79 09/16/2024    LDLCALC 15 09/16/2024    HDL 38.8 09/16/2024     No results found for: \"BMCBC\", \"CBCDIF\"     PHARMACEUTICAL ASSESSMENT:    MEDICATION RECONCILIATION    Home Pharmacy Reviewed? Yes, describe: [facility]; KLELY Khan    Drug Interactions? No    Medication Documentation Review Audit       Reviewed by FRANCISCO Alaniz-CNP (Nurse Practitioner) on 12/30/24 at 1112      Medication Order Taking? Sig Documenting Provider Last Dose Status   acetaminophen " (Tylenol) 500 mg tablet [id number] Yes Take 2 tablets (1,000 mg) by mouth every 6 hours if needed for mild pain (1 - 3). Historical Provider, MD  Active   albuterol 90 mcg/actuation inhaler [id number] Yes Inhale 2 puffs every 4 hours if needed for wheezing or shortness of breath (inhale 2 puffs into the lungs every 4 to 6 hours as needed). Sean Romero MD  Active   ammonium lactate (Lac-Hydrin) 12 % lotion [id number] Yes Apply topically if needed for dry skin. ACACIA Orellana  Active   apixaban (Eliquis) 5 mg tablet [id number] Yes TAKE 1 TABLET BY MOUTH TWO TIMES A DAY ACACIA Alaniz  Active   aspirin 81 mg EC tablet [id number] Yes Take 1 tablet (81 mg) by mouth once daily. Historical Provider, MD  Active   atorvastatin (Lipitor) 80 mg tablet [id number] Yes TAKE 1 TABLET BY MOUTH AT BEDTIME ACACIA Williamson  Active   blood glucose control, low (True Metrix Level 1) solution [id number] Yes Apply 1 each topically see administration instructions. Sean Persaud,   Active   carvedilol (Coreg) 6.25 mg tablet [id number] Yes Take 1 tablet (6.25 mg) by mouth 2 times daily (morning and late afternoon). ACACIA Alaniz  Active   DropSafe Alcohol Prep Pads pads, medicated [id number] Yes USE AS DIRECTED Amy Cummings,   Active   empagliflozin (Jardiance) 25 mg [id number]  Take 1 tablet (25 mg) by mouth once daily. Devyn Brooks MD   24 2359   ezetimibe (Zetia) 10 mg tablet [id number] Yes Take 1 tablet (10 mg) by mouth once daily. ACACIA Alaniz  Active     Discontinued 24 1058    Discontinued 24 1058   furosemide (Lasix) 20 mg tablet [id number]  Take 1 tablet (20 mg) by mouth once daily. ACACIA Alaniz  Active   glimepiride (AmaryL) 4 mg tablet [id number] Yes Take 1 tablet (4 mg) by mouth once daily in the morning. Take before meals. FRANCISCO Orellana-CNP  Active   levothyroxine (Synthroid, Levoxyl) 50 mcg tablet [id number] Yes Take 1 tablet  (50 mcg) by mouth once daily. ACACIA Orellana  Active   losartan (Cozaar) 25 mg tablet [id number] Yes Take 1 tablet (25 mg) by mouth once daily. ACACIA Alaniz  Active   meclizine (Antivert) 12.5 mg tablet [id number] Yes Take 2 tablets (25 mg) by mouth 3 times a day as needed for dizziness. ACACIA Orellana  Active   metFORMIN (Glucophage) 1,000 mg tablet [id number] Yes TAKE 1 TABLET BY MOUTH IN THE MORNING AND AT BEDTIME Amy Cummings DO  Active   NON FORMULARY [id number] Yes Take 2 each by mouth once daily. UMARY supplement Historical Provider, MD  Active   NON FORMULARY [id number] Yes Take 1 each by mouth once daily. Yeast  rELIEF Historical Provider, MD  Active   pantoprazole (ProtoNix) 40 mg EC tablet [id number] Yes Take 1 tablet (40 mg) by mouth once daily. ACACIA Orellana  Active   pregabalin (Lyrica) 50 mg capsule [id number] Yes Take 1 capsule (50 mg) by mouth 2 times a day. ACACIA Orellana  Active   sertraline (Zoloft) 100 mg tablet [id number] Yes Take 1 tablet (100 mg) by mouth once daily. ACACIA Orellana  Active    Discontinued 12/30/24 1058   spironolactone (Aldactone) 25 mg tablet [id number]  Take 0.5 tablets (12.5 mg) by mouth once daily. ACACIA Alaniz  Active   triamcinolone (Kenalog) 0.1 % cream [id number] Yes Apply topically 2 times a day. Apply to affected area 1-2 times daily as needed. Avoid face and groin. ACACIA Orellana  Active   True Metrix Air Glucose Meter kit [id number] Yes  Historical Provider, MD  Active   True Metrix Glucose Test Strip strip [id number] Yes USE AS DIRECTED Amy Cummings DO  Active   TRUEplus Lancets 33 gauge misc [id number] Yes USE AS DIRECTED Amy Cummings DO  Active                    DISEASE MANAGEMENT ASSESSMENT:     ANTICOAGULATION ASSESSMENT    The ASCVD Risk score (Lilliana SANDERSON, et al., 2019) failed to calculate for the following reasons:    Risk score cannot be calculated  because patient has a medical history suggesting prior/existing ASCVD    DIAGNOSIS: prevention of nonvalvular atrial fibrilliation stroke and systemic embolism  - Patient is projected to be on anticoagulation long term  - [id number] Score: [6] (only included if diagnosis is atrial fibrillation)   Age: [<65 (0)] [65-74 (+1)] [> 75 (+2)]: 2  Sex: [Male/Female (+1)]: 1  CHF history: [No/Yes(+1)]: 1  Hypertension history: [No/Yes(+1)]: 1  Stroke/TIA/thromboembolism history: [No/Yes(+2)]: 0  Vascular disease history (prior MI, peripheral artery disease, aortic plaque): [No/Yes(+1)]: 0  Diabetes history: [No/Yes(+1)]: 1    CURRENT PHARMACOTHERAPY:    Eliquis 5mg twice daily  73yo  83.9kg  Scr-0.87    RELEVANT PAST MEDICAL HISTORY:   Afib, HTN, HLD, DM, CHF    Affordability/Accessibility:  PAP screen  Adherence/Organization: reports non-adherence; twice per week (evening doses); patient uses pill box  Adverse Reactions: none reported  Recent Hospitalizations: none   Recent Falls/Trauma: none reported  Changes in Tobacco or Alcohol Intake:   Tobacco: does not use  Alcohol: occasionally    EDUCATION/COUNSELING:   - Counseled patient on MOA, expectations, duration of therapy, contraindications, administration, and monitoring parameters  - Counseled patient of side effects that are indicative of bleeding such as dark tarry stool, unexplainable bruising, or vomiting up a coffee ground like substance     CHF ASSESSMENT     Symptom/Staging:  -Most recent ejection fraction: 50-55%    Results for orders placed during the hospital encounter of 12/26/23    Transthoracic echo (TTE) [place] [id number]  Phone [phone] Fax [phone]    TRANSTHORACIC ECHOCARDIOGRAM REPORT      Patient Name:      ADRIEL Loco Physician:    [id number] Flakita Sood MD  Study Date:        12/26/2023           Ordering Provider:    [id number] NORMA JACKSON  MRN/PID:            [id number]             Fellow:  Accession#:        [id number]         Nurse:  Date of Birth/Age: 1950 / 73 years Sonographer:          María Elena Gray RDCS  Gender:            F                    Additional Staff:  Height:            157.48 cm            Admit Date:  Weight:            82.55 kg             Admission Status:     Outpatient  BSA:               1.84 m2              Department Location:  [facility] Echo  Lab  Blood Pressure: 132 /88 mmHg    Study Type:    TRANSTHORACIC ECHO (TTE) COMPLETE  Diagnosis/ICD: Dilated cardiomyopathy-I42.0; Atherosclerotic heart disease of  native coronary artery without angina pectoris-I25.10  Indication:    Dilated CM, Dyspnea on Exertion  CPT Codes:     Echo Complete w Full [id number]    Patient History:  Pertinent History: CAD, DILATED CM, CHF, HTN, HLD, CABG.    Study Detail: The following Echo studies were performed: 2D, M-Mode, Doppler and  color flow.      PHYSICIAN INTERPRETATION:  Left Ventricle: Left ventricular systolic function is low normal, with an estimated ejection fraction of 50-55%. There are no regional wall motion abnormalities. The left ventricular cavity size is normal. Spectral Doppler shows a pseudonormal pattern of left ventricular diastolic filling. There are elevated left atrial and left ventricular end diastolic pressures.  Left Atrium: The left atrium is mildly dilated.  Right Ventricle: The right ventricle is normal in size. There is low normal right ventricular systolic function.  Right Atrium: The right atrium is normal in size.  Aortic Valve: The aortic valve is trileaflet. There is no evidence of aortic valve regurgitation. The peak instantaneous gradient of the aortic valve is 5.3 mmHg. The mean gradient of the aortic valve is 2.9 mmHg.  Mitral Valve: The mitral valve is normal in structure. There is trace mitral valve regurgitation.  Tricuspid Valve: The tricuspid valve is structurally normal. There is mild tricuspid  regurgitation. The Doppler estimated RVSP is mildly elevated at 42.5 mmHg.  Pulmonic Valve: The pulmonic valve is structurally normal. There is physiologic pulmonic valve regurgitation.  Pericardium: There is no pericardial effusion noted. There is a pericardial fat pad present.  Aorta: The aortic root is normal.      CONCLUSIONS:  1. Left ventricular systolic function is low normal with a 50-55% estimated ejection fraction.  2. Spectral Doppler shows a pseudonormal pattern of left ventricular diastolic filling.  3. There are elevated left atrial and left ventricular end diastolic pressures.  4. There is low normal right ventricular systolic function.  5. Mildly elevated RVSP.    QUANTITATIVE DATA SUMMARY:  2D MEASUREMENTS:  Normal Ranges:  LAs:           4.42 cm   (2.7-4.0cm)  IVSd:          0.99 cm   (0.6-1.1cm)  LVPWd:         1.05 cm   (0.6-1.1cm)  LVIDd:         4.69 cm   (3.9-5.9cm)  LVIDs:         3.40 cm  LV Mass Index: 91.5 g/m2  LV % FS        27.6 %    LA VOLUME:  Normal Ranges:  LA Vol A4C:        67.1 ml    (22+/-6mL/m2)  LA Vol A2C:        50.8 ml  LA Vol BP:         59.4 ml  LA Vol Index A4C:  36.5 ml/m2  LA Vol Index A2C:  27.7 ml/m2  LA Vol Index BP:   32.4 ml/m2  LA Area A4C:       21.4 cm2  LA Area A2C:       18.3 cm2  LA Major Axis A4C: 5.8 cm  LA Major Axis A2C: 5.6 cm  LA Volume Index:   32.4 ml/m2  LA Vol A4C:        63.0 ml  LA Vol A2C:        47.6 ml    RA VOLUME BY A/L METHOD:  Normal Ranges:  RA Area A4C: 11.4 cm2    AORTA MEASUREMENTS:  Normal Ranges:  Ao Sinus, d: 3.00 cm (2.1-3.5cm)  Ao STJ, d:   2.40 cm (1.7-3.4cm)  Asc Ao, d:   3.30 cm (2.1-3.4cm)    LV SYSTOLIC FUNCTION BY 2D PLANIMETRY (MOD):  Normal Ranges:  EF-A4C View: 56.4 % (>=55%)  EF-A2C View: 54.4 %  EF-Biplane:  54.4 %    LV DIASTOLIC FUNCTION:  Normal Ranges:  MV Peak E:    0.98 m/s    (0.7-1.2 m/s)  MV Peak A:    0.48 m/s    (0.42-0.7 m/s)  E/A Ratio:    2.05        (1.0-2.2)  MV e'         0.05 m/s    (>8.0)  MV lateral  e' 0.05 m/s  MV medial e'  0.05 m/s  MV A Dur:     134.16 msec  E/e' Ratio:   19.65       (<8.0)    MITRAL VALVE:  Normal Ranges:  MV DT: 219 msec (150-240msec)    AORTIC VALVE:  Normal Ranges:  AoV Vmax:                1.15 m/s (<=1.7m/s)  AoV Peak P.3 mmHg (<20mmHg)  AoV Mean P.9 mmHg (1.7-11.5mmHg)  LVOT Max Derik:            0.80 m/s (<=1.1m/s)  AoV VTI:                 25.40 cm (18-25cm)  LVOT VTI:                19.86 cm  LVOT Diameter:           2.03 cm  (1.8-2.4cm)  AoV Area, VTI:           2.53 cm2 (2.5-5.5cm2)  AoV Area,Vmax:           2.26 cm2 (2.5-4.5cm2)  AoV Dimensionless Index: 0.78      RIGHT VENTRICLE:  RV Basal 2.95 cm  RV Mid   2.00 cm  RV Major 6.3 cm  TAPSE:   19.6 mm  RV s'    0.12 m/s    TRICUSPID VALVE/RVSP:  Normal Ranges:  Peak TR Velocity: 3.14 m/s  RV Syst Pressure: 42.5 mmHg (< 30mmHg)  IVC Diam:         1.42 cm    AORTA:  Asc Ao Diam 3.27 cm      [id number] Flakita Sood MD  Electronically signed on 2023 at 4:44:50 PM        ** Final **      Guideline-Directed Medical Therapy:  -ARNI: No   -If no, then ACEi/ARB?: Yes, describe: Losartan 25mg daily  -Beta Blocker: Yes, describe: Carvedilol 6.25mg twice daily  -MRA: Yes, describe: Spironolactone 12.5mg daily  -SGLT2i: Yes, describe: Jardiance 25mg daily    Secondary Prevention:  -The ASCVD Risk score (Lilliana SANDERSON, et al., 2019) failed to calculate for the following reasons:    Risk score cannot be calculated because patient has a medical history suggesting prior/existing ASCVD   -Aspirin 81mg? yes  -Statin?: Yes, describe: Atorvastatin 80mg daily  -HTN?: Yes, describe: Controlled at last OV    CURRENT PHARMACOTHERAPY:   Losartan 25mg daily  Carvedilol 6.25mg twice daily  Spironolactone 12.5mg daily  Jardiance 25mg daily  Furosemide 20mg daily    Affordability:  PAP screen  Adherence/Compliance:  reports non-adherence; twice per week (evening doses); patient uses pill box  Adverse Effects: none  reported    Monitoring Weights at Home: Yes; occasionally  Home Weight Recordings: unable to assess     Past In Office Weight Readings:   Wt Readings from Last 6 Encounters:   12/30/24 83.9 kg (185 lb)   12/02/24 83.5 kg (184 lb)   10/23/24 86.2 kg (190 lb)   10/09/24 89.4 kg (197 lb)   08/30/24 88.9 kg (196 lb)   05/30/24 88 kg (194 lb)       Monitoring Blood Pressure at Home: No- patient does not have BP machine  Home BP Recordings: unable to assess    Past In Office BP Readings:   BP Readings from Last 6 Encounters:   12/30/24 128/62   12/02/24 124/72   10/23/24 126/62   10/09/24 120/60   08/30/24 138/64   05/30/24 124/70       HEALTH MANAGEMENT    Maintaining fluid restriction (<2 L/day): N/A  Edema/swelling: No  Shortness of breath: Yes; walking to bathroom and kitchen  Trouble sleeping/lying down: Yes  Dry/hacking cough: Yes  Recent Hospitalizations: No    EDUCATION/COUNSELING:   - Counseled patient on MOA, expectations, duration of therapy, contraindications, administration, and monitoring parameters  - Counseled patient on lifestyle modifications that can decrease your risk of having complications (smoking cessation, losing weight, daily weights, vaccines)  - Counseled patient on fluid intake and weight management. Recommended to not consume more than 2 liters of fliuids per day. If they have gained more than 2-3 pounds within a 24 hours period (or 5 pounds in a week), contact their cardiologist  - Answered all patient questions and concerns     DISCUSSION/NOTES:   Today was an initial visit to establish with clinical pharmacy. Patient medications and allergies were reviewed and updated. Patient admits non adherence- she reports that she will miss her evening doses of medication about twice weekly. Discussed in depth with patient the importance of taking medication as prescribed and additional adherence techniques like setting an alarm. Patient does use a pill box.  Patient states she is doing well on  anticoagulation therapy. Patient reports no adverse effects, and denies s/s of bleeding.   Patient states she is tolerating her heart failure regimen with no adverse effects. Patient will 'sometimes' take her weight and does not monitor BP. Patient reports SOB when she walks to the bathroom or kitchen, SOB when she lies down and endorses a dry, hacking cough. Again, stressed the importance of taking medication as prescribed.  Patient was screened for  PAP. Patient reports Jardiance and Eliquis cost $147/30 day supply. Plan to submit PAP application once income documentation is received. Future plan pending  PAP approval- START Entresto 24/26mg twice daily which will REPLACE losartan 25mg daily.    ASSESSMENT:     Patient Assistance Program (PAP)    Application for program to be submitted for the following medications: Eliquis, Jardiance, Entresto    County of Permanent Address: [place]   Prescription Insurance:   Yes   Members of Household: 1   Files Taxes: No-social security       Patient will be faxing financial information to pharmacist directly at [phone].    Patient verbally reports monthly or yearly income which is less than 400% federal poverty level    Patient aware this process may take up to 6 weeks.     If approved medication must be filled through [facility] and MEDICATION WILL BE MAILED TO PATIENT.         Assessment/Plan   Problem List Items Addressed This Visit       Atrial fibrillation (Multi) (Chronic)     Patient age, weight and renal function appropriate to continue Eliquis 5mg twice daily.         Relevant Orders    Referral to Clinical Pharmacy    Heart failure with improved ejection fraction (HFimpEF)     Patient currently on 4 of 4 GDMT. Unable to assess BP. Renal function and potassium level appropriate to continue current regimen as prescribed. Future plan pending  PAP- start Entresto 24/26mg twice daily, which will replace losartan 25mg daily.     Labs (11/05/2024):  Scr-0.87 eGFR-70 K-4.3         Relevant Orders    Referral to Clinical Pharmacy         RECOMMENDATIONS/PLAN:    CONTINUE  Losartan 25mg daily  Carvedilol 6.25mg twice daily  Spironolactone 12.5mg daily  Jardiance 25mg daily  Furosemide 20mg daily  Eliquis 5mg twice daily    Last Appnt with Referring Provider: 12/30/2024  Next Appnt with Referring Provider: 03/24/2025  Clinical Pharmacist follow up: 1 month  VAF/Application Expiration: pending  Type of Encounter: Toro Colindres PharmD    Verbal consent to manage patient's drug therapy was obtained from the patient . They were informed they may decline to participate or withdraw from participation in pharmacy services at any time.    Continue all meds under the continuation of care with the referring provider and clinical pharmacy team.

## 2025-01-02 NOTE — ASSESSMENT & PLAN NOTE
Patient currently on 4 of 4 GDMT. Unable to assess BP. Renal function and potassium level appropriate to continue current regimen as prescribed. Future plan pending  PAP- start Entresto 24/26mg twice daily, which will replace losartan 25mg daily.     Labs (11/05/2024): Scr-0.87 eGFR-70 K-4.3

## 2025-01-02 NOTE — Clinical Note
Christian Escoto,  Today I spoke with Victorina about her Eliquis, heart failure medications and our cost assistance program. Patient states she is doing well on anticoagulation therapy and her heart failure regimen. Patient reports no adverse effects, denies s/s of bleeding and endorses SOB. Patient admits non adherence- she reports that she will miss her evening doses of medication about twice weekly. She was educated on the importance of taking medication. Plan to submit PAP application once income documentation is received. Future plan pending  PAP approval- start Entresto 24/26mg twice daily and stop losartan 25mg daily. Plan to follow up in 1 month. Thank you!

## 2025-01-09 DIAGNOSIS — I50.32 HEART FAILURE WITH IMPROVED EJECTION FRACTION (HFIMPEF): ICD-10-CM

## 2025-01-09 DIAGNOSIS — I48.0 PAROXYSMAL ATRIAL FIBRILLATION (MULTI): Primary | ICD-10-CM

## 2025-01-09 RX ORDER — SACUBITRIL AND VALSARTAN 24; 26 MG/1; MG/1
1 TABLET, FILM COATED ORAL 2 TIMES DAILY
Qty: 180 TABLET | Refills: 3 | Status: SHIPPED | OUTPATIENT
Start: 2025-01-09

## 2025-01-10 PROCEDURE — RXMED WILLOW AMBULATORY MEDICATION CHARGE

## 2025-01-14 ENCOUNTER — APPOINTMENT (OUTPATIENT)
Dept: CARDIOLOGY | Facility: HOSPITAL | Age: 75
End: 2025-01-14
Payer: MEDICARE

## 2025-01-14 ENCOUNTER — TELEPHONE (OUTPATIENT)
Dept: PHARMACY | Facility: HOSPITAL | Age: 75
End: 2025-01-14
Payer: MEDICARE

## 2025-01-14 NOTE — TELEPHONE ENCOUNTER
Patient Assistance Program Approval:     We are pleased to inform you that your application for assistance has been approved.     This approval is valid through 01/10/2026 as long as the following criteria continue to be satisfied:     Your medication (Entresto, Eliquis, Jardiance) remains covered under your current insurance plan.   Your prescriber does not discontinue therapy.   You do not seek reimbursement from any other private or government-funded programs for the  medication.    Under this program, the pharmacy will first bill your insurance plan for your indemnified specified medication. The [employer] will then offset your copay balance, so that your out-of pocket expense for your specialty medication will be $0.00.      Patient to STOP losartan 25mg daily once she receives shipment of Entresto 24/26mg twice daily. Her first dose of Entresto will replace her next dose of losartan.     Evelia Colindres, PharmD

## 2025-01-15 ENCOUNTER — PHARMACY VISIT (OUTPATIENT)
Dept: PHARMACY | Facility: CLINIC | Age: 75
End: 2025-01-15
Payer: COMMERCIAL

## 2025-01-16 ENCOUNTER — HOSPITAL ENCOUNTER (OUTPATIENT)
Dept: CARDIOLOGY | Facility: HOSPITAL | Age: 75
Discharge: HOME | End: 2025-01-16
Payer: MEDICARE

## 2025-01-16 ENCOUNTER — TELEPHONE (OUTPATIENT)
Dept: CARDIOLOGY | Facility: HOSPITAL | Age: 75
End: 2025-01-16

## 2025-01-16 DIAGNOSIS — I50.22 CHRONIC SYSTOLIC HEART FAILURE: ICD-10-CM

## 2025-01-16 DIAGNOSIS — R06.02 SHORTNESS OF BREATH: ICD-10-CM

## 2025-01-16 LAB
AORTIC VALVE MEAN GRADIENT: 4 MMHG
AORTIC VALVE PEAK VELOCITY: 1.22 M/S
AV PEAK GRADIENT: 6 MMHG
AVA (PEAK VEL): 2.73 CM2
AVA (VTI): 2.48 CM2
EJECTION FRACTION APICAL 4 CHAMBER: 21.2
EJECTION FRACTION: 45 %
GLOBAL LONGITUDINAL STRAIN: 10 %
LEFT ATRIUM VOLUME AREA LENGTH INDEX BSA: 27.3 ML/M2
LEFT VENTRICLE INTERNAL DIMENSION DIASTOLE: 5.36 CM (ref 3.5–6)
LEFT VENTRICULAR OUTFLOW TRACT DIAMETER: 2 CM
LV EJECTION FRACTION BIPLANE: 33 %
RIGHT VENTRICLE FREE WALL PEAK S': 8.55 CM/S
RIGHT VENTRICLE PEAK SYSTOLIC PRESSURE: 47.4 MMHG
TRICUSPID ANNULAR PLANE SYSTOLIC EXCURSION: 2 CM

## 2025-01-16 PROCEDURE — 93306 TTE W/DOPPLER COMPLETE: CPT

## 2025-01-16 PROCEDURE — 93306 TTE W/DOPPLER COMPLETE: CPT | Performed by: INTERNAL MEDICINE

## 2025-01-16 NOTE — TELEPHONE ENCOUNTER
Result Communication    Resulted Orders   Transthoracic Echo (TTE) Complete   Result Value Ref Range    LVOT diam 2.00 cm    LV Biplane EF 33 %    Tricuspid annular plane systolic excursion 2.0 cm    AV mn grad 4 mmHg    LA vol index A/L 27.3 ml/m2    AV pk benitez 1.22 m/s    LV EF 45 %    RV free wall pk S' 8.55 cm/s    LV GLS 10.0 %    RVSP 47.4 mmHg    LVIDd 5.36 cm    Aortic Valve Area by Continuity of VTI 2.48 cm2    Aortic Valve Area by Continuity of Peak Velocity 2.73 cm2    AV pk grad 6 mmHg    LV A4C EF 21.2     [place] [address]       Phone [phone] Fax [phone]    TRANSTHORACIC ECHOCARDIOGRAM REPORT    Patient Name:       ADRIEL Loco Physician:    [id number] Blake Doran DO  Study Date:         1/16/2025           Ordering Provider:    [id number] LIBRADO LOWERY  MRN/PID:            [id number]            Fellow:  Accession#:         [id number]        Nurse:  Date of Birth/Age:  1950 / 74      Sonographer:          María Elena Ochoa RDCS                      years  Gender Assigned at  F                   Additional Staff:  Birth:  Height:             157.48 cm           Admit Date:           1/16/2025  Weight:             83.01 kg            Admission Status:     Outpatient  BSA / BMI:          1.84 m2 / 33.47     Department Location:  [facility] Echo                      kg/m2                                     Lab  Blood Pressure: 128 /62 mmHg    Study Type:    TRANSTHORACIC ECHO (TTE) COMPLETE  Diagnosis/ICD: Chronic systolic (congestive) heart failure (CHF)-I50.22;                 Shortness of breath-R06.02  Indication:    Congestive Heart Failure  CPT Codes:     Echo Complete w Full Doppler-81521    Patient History:  Pertinent History: CAD, HTN, Hyperlipidemia and CHF.    Study Detail:  The following Echo studies were performed: 2D, Doppler, M-Mode,                color flow and Strain.       PHYSICIAN INTERPRETATION:  Left Ventricle: Left ventricular ejection fraction is mildly decreased, by visual estimate at 45%. There is global hypokinesis of the left ventricle with minor regional variations. The left ventricular cavity size is normal. There is normal septal and normal posterior left ventricular wall thickness. Left Ventricular Global Longitudinal Strain - 10.0 %. Spectral Doppler shows an abnormal pattern of left ventricular diastolic filling.  Left Atrium: The left atrium is normal in size.  Right Ventricle: The right ventricle is normal in size. There is normal right ventricular global systolic function.  Right Atrium: The right atrium is normal in size.  Aortic Valve: The aortic valve is trileaflet. The aortic valve dimensionless index is 0.79. There is no evidence of aortic valve regurgitation. The peak instantaneous gradient of the aortic valve is 6 mmHg. The mean gradient of the aortic valve is 4 mmHg.  Mitral Valve: The mitral valve is normal in structure. There is mild mitral annular calcification. There is trace to mild mitral valve regurgitation.  Tricuspid Valve: The tricuspid valve is structurally normal. There is mild tricuspid regurgitation. The Doppler estimated RVSP is mild to moderately elevated at 47.4 mmHg.  Pulmonic Valve: The pulmonic valve is structurally normal. There is trace pulmonic valve regurgitation.  Pericardium: No pericardial effusion noted.  Aorta: The aortic root is normal.       CONCLUSIONS:   1. Left ventricular ejection fraction is mildly decreased, by visual estimate at 45%.   2. There is global hypokinesis of the left ventricle with minor regional variations.   3. Spectral Doppler shows an abnormal pattern of left ventricular diastolic filling.   4. There is normal right ventricular global systolic function.   5. Mild to moderately elevated right  ventricular systolic pressure.    QUANTITATIVE DATA SUMMARY:     2D MEASUREMENTS:           Normal Ranges:  IVSd:            0.90 cm   (0.6-1.1cm)  LVPWd:           0.86 cm   (0.6-1.1cm)  LVIDd:           5.36 cm   (3.9-5.9cm)  LVIDs:           3.87 cm  LV Mass Index:   93.5 g/m2  LV % FS          27.9 %       LA VOLUME:                    Normal Ranges:  LA Vol A4C:        56.3 ml    (22+/-6mL/m2)  LA Vol A2C:        44.1 ml  LA Vol BP:         50.3 ml  LA Vol Index A4C:  30.6ml/m2  LA Vol Index A2C:  24.0 ml/m2  LA Vol Index BP:   27.3 ml/m2  LA Area A4C:       19.6 cm2  LA Area A2C:       17.5 cm2  LA Major Axis A4C: 5.8 cm  LA Major Axis A2C: 5.9 cm  LA Volume Index:   27.3 ml/m2  LA Vol A4C:        53.2 ml  LA Vol A2C:        42.4 ml  LA Vol Index BSA:  26.0 ml/m2       RA VOLUME BY A/L METHOD:          Normal Ranges:  RA Area A4C:             12.5 cm2       AORTA MEASUREMENTS:         Normal Ranges:  Ao Sinus, d:        3.60 cm (2.1-3.5cm)  Ao STJ, d:          2.80 cm (1.7-3.4cm)  Asc Ao, d:          2.90 cm (2.1-3.4cm)       LV SYSTOLIC FUNCTION BY 2D PLANIMETRY (MOD):                                         Normal Ranges:  EF-A4C View:                      21 % (>=55%)  EF-A2C View:                      43 %  EF-Biplane:                       33 %  EF-Visual:                        45 %  EF-Auto:                          39 %  LV EF Reported:                   45 %  Global Longitudinal Strain (GLS): 10 %       LV DIASTOLIC FUNCTION:           Normal Ranges:  MV Peak E:             1.00 m/s  (0.7-1.2 m/s)  MV e'                  0.051 m/s (>8.0)  MV lateral e'          0.06 m/s  MV medial e'           0.04 m/s  E/e' Ratio:            19.51     (<8.0)       AORTIC VALVE:                     Normal Ranges:  AoV Vmax:                1.22 m/s (<=1.7m/s)  AoV Peak P.9 mmHg (<20mmHg)  AoV Mean PG:             3.5 mmHg (1.7-11.5mmHg)  LVOT Max Derik:            1.05 m/s (<=1.1m/s)  AoV VTI:                  30.66 cm (18-25cm)  LVOT VTI:                24.14 cm  LVOT Diameter:           2.00 cm  (1.8-2.4cm)  AoV Area, VTI:           2.48 cm2 (2.5-5.5cm2)  AoV Area,Vmax:           2.73 cm2 (2.5-4.5cm2)  AoV Dimensionless Index: 0.79       RIGHT VENTRICLE:  RV Basal 3.17 cm  RV Mid   2.50 cm  RV Major 7.2 cm  TAPSE:   19.8 mm  RV s'    0.09 m/s       TRICUSPID VALVE/RVSP:          Normal Ranges:  Peak TR Velocity:     3.33 m/s  RV Syst Pressure:     47 mmHg  (< 30mmHg)  IVC Diam:             1.42 cm  TV e'                 0.1 m/s       PULMONIC VALVE:         Normal Ranges:  PV Accel Time:  97 msec (>120ms)       AORTA:  Asc Ao Diam 3.59 cm       [id number] Blake Doran DO  Electronically signed on 1/16/2025 at 11:59:58 AM         ** Final **         12:52 PM      Results were not successfully communicated with the patient.  No answer and voicemail left with our call back number.  She has f/u with Dr. Devyn Brooks March 2025.

## 2025-02-03 NOTE — PROGRESS NOTES
Pharmacist Clinic: Cardiology Management    Victorina Adamson is a 74 y.o. female was referred to Clinical Pharmacy Team for CHF and anticoagulation management.     Referring Provider: Tigist aClle APRN*    THIS IS A FOLLOW UP PATIENT APPOINTMENT. AT LAST VISIT ON 1/2/25 WITH PHARMACIST (Evelia Colindres).    Appointment was completed by Victorina who was reached at Home phone.    REVIEW OF PAST APPNT (IF APPLICABLE):   Today was an initial visit to establish with clinical pharmacy. Patient medications and allergies were reviewed and updated. Patient admits non adherence- she reports that she will miss her evening doses of medication about twice weekly. Discussed in depth with patient the importance of taking medication as prescribed and additional adherence techniques like setting an alarm. Patient does use a pill box.  Patient states she is doing well on anticoagulation therapy. Patient reports no adverse effects, and denies s/s of bleeding.   Patient states she is tolerating her heart failure regimen with no adverse effects. Patient will 'sometimes' take her weight and does not monitor BP. Patient reports SOB when she walks to the bathroom or kitchen, SOB when she lies down and endorses a dry, hacking cough. Again, stressed the importance of taking medication as prescribed.  Patient was screened for  PAP. Patient reports Jardiance and Eliquis cost $147/30 day supply. Plan to submit PAP application once income documentation is received. Future plan pending  PAP approval- START Entresto 24/26mg twice daily which will REPLACE losartan 25mg daily.    Allergies Reviewed? No    Allergies   Allergen Reactions    Gabapentin Unknown    Hydrocodone-Acetaminophen Unknown    Influenza Virus Vaccines Unknown    Lisinopril Cough    Oxycodone Unknown       Past Medical History:   Diagnosis Date    Acute upper respiratory infection, unspecified 02/11/2022    URI, acute    Anxiety disorder, unspecified     Anxiety and  depression    Chronic tension-type headache, not intractable     Chronic tension headaches    Diverticulosis of intestine, part unspecified, without perforation or abscess without bleeding     Diverticulosis    Encounter for general adult medical examination without abnormal findings 09/20/2021    Medicare annual wellness visit, subsequent    Encounter for other screening for malignant neoplasm of breast 02/11/2021    Breast cancer screening    Encounter for preprocedural cardiovascular examination     Pre-operative cardiovascular examination    Encounter for screening for malignant neoplasm of colon 02/11/2021    Colon cancer screening    Lumbago with sciatica, right side 09/20/2021    Acute right-sided low back pain with right-sided sciatica    Major depressive disorder, single episode, mild (CMS-[facility]) 09/20/2021    Depression, major, single episode, mild    Obesity, unspecified 06/22/2020    Obesity (BMI 30-39.9)    Other intervertebral disc degeneration, lumbar region 03/15/2022    Degeneration of intervertebral disc of lumbar region    Other specified health status 06/22/2020    Intolerance of continuous positive airway pressure (CPAP) ventilation    Other unilateral secondary osteoarthritis of hip     Other secondary osteoarthritis of right hip    Pain in right hip 10/02/2021    Hip pain, right    Personal history of other diseases of the digestive system     History of gastroesophageal reflux (GERD)    Personal history of other diseases of the digestive system     History of diverticulitis    Personal history of other diseases of the musculoskeletal system and connective tissue 06/08/2021    History of arthritis    Personal history of other diseases of the nervous system and sense organs 03/28/2022    History of acute otitis media    Personal history of other infectious and parasitic diseases 06/08/2021    History of candidiasis of vagina    Personal history of other infectious and parasitic diseases  06/18/2020    History of herpes zoster    Personal history of other mental and behavioral disorders 03/15/2022    History of depression    Personal history of other specified conditions     History of vertigo    Personal history of other specified conditions 02/11/2021    History of dysphagia    Personal history of other specified conditions 06/22/2020    History of insomnia    Spinal stenosis, lumbosacral region 11/23/2021    Spinal stenosis of lumbosacral region    Spondylosis without myelopathy or radiculopathy, lumbar region 11/30/2021    Lumbar spondylosis    Unspecified fall, subsequent encounter 09/20/2021    Fall at home, subsequent encounter       Current Outpatient Medications on File Prior to Visit   Medication Sig Dispense Refill    acetaminophen (Tylenol) 500 mg tablet Take 2 tablets (1,000 mg) by mouth every 6 hours if needed for mild pain (1 - 3).      albuterol 90 mcg/actuation inhaler Inhale 2 puffs every 4 hours if needed for wheezing or shortness of breath (inhale 2 puffs into the lungs every 4 to 6 hours as needed). 18 g 11    ammonium lactate (Lac-Hydrin) 12 % lotion Apply topically if needed for dry skin. 2258 g 2    apixaban (Eliquis) 5 mg tablet Take 1 tablet (5 mg) by mouth 2 times a day. 180 tablet 3    aspirin 81 mg EC tablet Take 1 tablet (81 mg) by mouth once daily.      atorvastatin (Lipitor) 80 mg tablet TAKE 1 TABLET BY MOUTH AT BEDTIME 90 tablet 3    blood glucose control, low (True Metrix Level 1) solution Apply 1 each topically see administration instructions. 1 each 0    carvedilol (Coreg) 6.25 mg tablet Take 1 tablet (6.25 mg) by mouth 2 times daily (morning and late afternoon). 180 tablet 3    DropSafe Alcohol Prep Pads pads, medicated USE AS DIRECTED 100 each 10    empagliflozin (Jardiance) 25 mg Take 1 tablet (25 mg) by mouth once daily. 90 tablet 3    ezetimibe (Zetia) 10 mg tablet Take 1 tablet (10 mg) by mouth once daily. 90 tablet 3    furosemide (Lasix) 20 mg tablet Take  1 tablet (20 mg) by mouth once daily. 90 tablet 1    glimepiride (AmaryL) 4 mg tablet Take 1 tablet (4 mg) by mouth once daily in the morning. Take before meals. 30 tablet 11    levothyroxine (Synthroid, Levoxyl) 50 mcg tablet Take 1 tablet (50 mcg) by mouth once daily. 90 tablet 3    losartan (Cozaar) 25 mg tablet Take 1 tablet (25 mg) by mouth once daily. 90 tablet 2    meclizine (Antivert) 12.5 mg tablet Take 2 tablets (25 mg) by mouth 3 times a day as needed for dizziness. 90 tablet 11    metFORMIN (Glucophage) 1,000 mg tablet TAKE 1 TABLET BY MOUTH IN THE MORNING AND AT BEDTIME 180 tablet 3    pantoprazole (ProtoNix) 40 mg EC tablet Take 1 tablet (40 mg) by mouth once daily. 90 tablet 1    pregabalin (Lyrica) 50 mg capsule Take 1 capsule (50 mg) by mouth 2 times a day. 180 capsule 1    sacubitriL-valsartan (Entresto) 24-26 mg tablet Take 1 tablet by mouth 2 times a day. 180 tablet 3    sertraline (Zoloft) 100 mg tablet Take 1 tablet (100 mg) by mouth once daily. 90 tablet 1    spironolactone (Aldactone) 25 mg tablet Take 0.5 tablets (12.5 mg) by mouth once daily. 45 tablet 1    triamcinolone (Kenalog) 0.1 % cream Apply topically 2 times a day. Apply to affected area 1-2 times daily as needed. Avoid face and groin. 30 g 2    True Metrix Air Glucose Meter kit       True Metrix Glucose Test Strip strip USE AS DIRECTED 200 strip 10    TRUEplus Lancets 33 gauge misc USE AS DIRECTED 200 each 10     No current facility-administered medications on file prior to visit.         RELEVANT LAB RESULTS:  Lab Results   Component Value Date    BILITOT 0.4 04/23/2024    CALCIUM 8.3 (L) 11/05/2024    CO2 26 11/05/2024     11/05/2024    CREATININE 0.87 11/05/2024    GLUCOSE 102 (H) 11/05/2024    ALKPHOS 69 04/23/2024    K 4.3 11/05/2024    PROT 6.3 (L) 04/23/2024     11/05/2024    AST 16 04/23/2024    ALT 23 04/23/2024    BUN 12 11/05/2024    ANIONGAP 14 11/05/2024    MG 1.47 (L) 03/16/2023    PHOS 4.8 05/03/2022     "ALBUMIN 4.0 04/23/2024    GFRF 78 05/10/2023    GFRMALE CANCELED 02/24/2023     Lab Results   Component Value Date    TRIG 126 09/16/2024    CHOL 79 09/16/2024    LDLCALC 15 09/16/2024    HDL 38.8 09/16/2024     No results found for: \"BMCBC\", \"CBCDIF\"     PHARMACEUTICAL ASSESSMENT:  MEDICATION RECONCILIATION  Home Pharmacy Reviewed? Yes, describe: [facility] and [facility] home delivery    Drug Interactions? No    Medication Documentation Review Audit       Reviewed by ACACIA Alaniz (Nurse Practitioner) on 12/30/24 at 1112      Medication Order Taking? Sig Documenting Provider Last Dose Status   acetaminophen (Tylenol) 500 mg tablet [id number] Yes Take 2 tablets (1,000 mg) by mouth every 6 hours if needed for mild pain (1 - 3). Historical Provider, MD  Active   albuterol 90 mcg/actuation inhaler [id number] Yes Inhale 2 puffs every 4 hours if needed for wheezing or shortness of breath (inhale 2 puffs into the lungs every 4 to 6 hours as needed). Sean Romero MD  Active   ammonium lactate (Lac-Hydrin) 12 % lotion [id number] Yes Apply topically if needed for dry skin. ACACIA Orellana  Active   apixaban (Eliquis) 5 mg tablet [id number] Yes TAKE 1 TABLET BY MOUTH TWO TIMES A DAY ACACIA Alaniz  Active   aspirin 81 mg EC tablet [id number] Yes Take 1 tablet (81 mg) by mouth once daily. Historical Provider, MD  Active   atorvastatin (Lipitor) 80 mg tablet [id number] Yes TAKE 1 TABLET BY MOUTH AT BEDTIME ACACIA Williamson  Active   blood glucose control, low (True Metrix Level 1) solution [id number] Yes Apply 1 each topically see administration instructions. eSan Persaud, DO  Active   carvedilol (Coreg) 6.25 mg tablet [id number] Yes Take 1 tablet (6.25 mg) by mouth 2 times daily (morning and late afternoon). ACACIA Alaniz  Active   DropSafe Alcohol Prep Pads pads, medicated [id number] Yes USE AS DIRECTED Amy Cummings, DO  Active   empagliflozin (Jardiance) 25 mg [id number]  Take 1 " tablet (25 mg) by mouth once daily. Devyn Brooks MD  Active   ezetimibe (Zetia) 10 mg tablet [id number] Yes Take 1 tablet (10 mg) by mouth once daily. ACACIA Alaniz  Active     Discontinued 12/30/24 1058    Discontinued 12/30/24 1058   furosemide (Lasix) 20 mg tablet [id number]  Take 1 tablet (20 mg) by mouth once daily. ACACIA Alaniz  Active   glimepiride (AmaryL) 4 mg tablet [id number] Yes Take 1 tablet (4 mg) by mouth once daily in the morning. Take before meals. ACACIA Orellana  Active   levothyroxine (Synthroid, Levoxyl) 50 mcg tablet [id number] Yes Take 1 tablet (50 mcg) by mouth once daily. ACACIA Orellana  Active   losartan (Cozaar) 25 mg tablet [id number] Yes Take 1 tablet (25 mg) by mouth once daily. ACACIA Alaniz  Active   meclizine (Antivert) 12.5 mg tablet [id number] Yes Take 2 tablets (25 mg) by mouth 3 times a day as needed for dizziness. ACACIA Orellana  Active   metFORMIN (Glucophage) 1,000 mg tablet [id number] Yes TAKE 1 TABLET BY MOUTH IN THE MORNING AND AT BEDTIME Amy Cummings DO  Active   NON FORMULARY [id number] Yes Take 2 each by mouth once daily. UMARY supplement Historical Provider, MD  Active   NON FORMULARY [id number] Yes Take 1 each by mouth once daily. Yeast  rELIEF Historical Provider, MD  Active   pantoprazole (ProtoNix) 40 mg EC tablet [id number] Yes Take 1 tablet (40 mg) by mouth once daily. ACACIA Orellana  Active   pregabalin (Lyrica) 50 mg capsule [id number] Yes Take 1 capsule (50 mg) by mouth 2 times a day. ACACIA Orellana  Active   sertraline (Zoloft) 100 mg tablet [id number] Yes Take 1 tablet (100 mg) by mouth once daily. ACACIA Orellana  Active    Discontinued 12/30/24 1058   spironolactone (Aldactone) 25 mg tablet [id number]  Take 0.5 tablets (12.5 mg) by mouth once daily. FRANCISCO Alaniz-CNP  Active   triamcinolone (Kenalog) 0.1 % cream [id number] Yes Apply  topically 2 times a day. Apply to affected area 1-2 times daily as needed. Avoid face and groin. FRANCISCO Orellana-CNP  Active   True Metrix Air Glucose Meter kit [id number] Yes  Historical Provider, MD  Active   True Metrix Glucose Test Strip strip [id number] Yes USE AS DIRECTED Amy Cummings DO  Active   TRUEplus Lancets 33 gauge misc [id number] Yes USE AS DIRECTED Amy Cummings DO  Active                    DISEASE MANAGEMENT ASSESSMENT:     ANTICOAGULATION ASSESSMENT    The ASCVD Risk score (Lilliana SANDERSON, et al., 2019) failed to calculate for the following reasons:    Risk score cannot be calculated because patient has a medical history suggesting prior/existing ASCVD    DIAGNOSIS: prevention of nonvalvular atrial fibrilliation stroke and systemic embolism  - Patient is projected to be on anticoagulation long-term  - [id number] Score: [6] (only included if diagnosis is atrial fibrillation)   Age: [<65 (0)] [65-74 (+1)] [> 75 (+2)]: 2  Sex: [Male/Female (+1)]: 1  CHF history: [No/Yes(+1)]: 1  Hypertension history: [No/Yes(+1)]: 1  Stroke/TIA/thromboembolism history: [No/Yes(+2)]: 0  Vascular disease history (prior MI, peripheral artery disease, aortic plaque): [No/Yes(+1)]: 0  Diabetes history: [No/Yes(+1)]: 1    CURRENT PHARMACOTHERAPY:    Eliquis 5mg BID  75 yo  83.9 kg  Scr 0.87 (11/05/2024)    RELEVANT PAST MEDICAL HISTORY:   Afib, HTN, HLD, DM, CHF    Affordability/Accessibility: PAP approved for Jardiance, Entresto, and Eliquis  Adherence/Organization: 3-4 in last month have missed nightly doses, uses pill box  Adverse Reactions: None reported  Recent Hospitalizations: None reported  Recent Falls/Trauma: None reported, dizziness every once in a while when standing up  Changes in Tobacco or Alcohol Intake:   Tobacco: none  Alcohol: occasionally    EDUCATION/COUNSELING:   - Counseled patient on MOA, expectations, duration of therapy, contraindications, administration, and monitoring parameters  -  Counseled patient of side effects that are indicative of bleeding such as dark tarry stool, unexplainable bruising, or vomiting up a coffee ground like substance    CHF ASSESSMENT     Symptom/Staging:  -Most recent ejection fraction: 45%  -NYHA Stage: unknown    Results for orders placed during the hospital encounter of 01/16/25    Transthoracic Echo (TTE) Complete    Narrative  [place] [id number]  Phone [phone] Fax [phone]    TRANSTHORACIC ECHOCARDIOGRAM REPORT    Patient Name:       ADRIEL FRANCISCO   Reading Physician:    [id number] Blake Doran DO  Study Date:         1/16/2025           Ordering Provider:    [id number] LIBRADO LOWERY  MRN/PID:            [id number]            Fellow:  Accession#:         [id number]        Nurse:  Date of Birth/Age:  1950 / 74      Sonographer:          María Elena Ochoa RDCS  years  Gender Assigned at  F                   Additional Staff:  Birth:  Height:             157.48 cm           Admit Date:           1/16/2025  Weight:             83.01 kg            Admission Status:     Outpatient  BSA / BMI:          1.84 m2 / 33.47     Department Location:  [facility] Echo  kg/m2                                     Lab  Blood Pressure: 128 /62 mmHg    Study Type:    TRANSTHORACIC ECHO (TTE) COMPLETE  Diagnosis/ICD: Chronic systolic (congestive) heart failure (CHF)-I50.22;  Shortness of breath-R06.02  Indication:    Congestive Heart Failure  CPT Codes:     Echo Complete w Full Doppler-02803    Patient History:  Pertinent History: CAD, HTN, Hyperlipidemia and CHF.    Study Detail: The following Echo studies were performed: 2D, Doppler, M-Mode,  color flow and Strain.      PHYSICIAN INTERPRETATION:  Left Ventricle: Left ventricular ejection fraction is mildly decreased, by visual estimate at 45%. There is global hypokinesis of the left ventricle with minor regional variations. The left ventricular cavity size is normal. There is  normal septal and normal posterior left ventricular wall thickness. Left Ventricular Global Longitudinal Strain - 10.0 %. Spectral Doppler shows an abnormal pattern of left ventricular diastolic filling.  Left Atrium: The left atrium is normal in size.  Right Ventricle: The right ventricle is normal in size. There is normal right ventricular global systolic function.  Right Atrium: The right atrium is normal in size.  Aortic Valve: The aortic valve is trileaflet. The aortic valve dimensionless index is 0.79. There is no evidence of aortic valve regurgitation. The peak instantaneous gradient of the aortic valve is 6 mmHg. The mean gradient of the aortic valve is 4 mmHg.  Mitral Valve: The mitral valve is normal in structure. There is mild mitral annular calcification. There is trace to mild mitral valve regurgitation.  Tricuspid Valve: The tricuspid valve is structurally normal. There is mild tricuspid regurgitation. The Doppler estimated RVSP is mild to moderately elevated at 47.4 mmHg.  Pulmonic Valve: The pulmonic valve is structurally normal. There is trace pulmonic valve regurgitation.  Pericardium: No pericardial effusion noted.  Aorta: The aortic root is normal.      CONCLUSIONS:  1. Left ventricular ejection fraction is mildly decreased, by visual estimate at 45%.  2. There is global hypokinesis of the left ventricle with minor regional variations.  3. Spectral Doppler shows an abnormal pattern of left ventricular diastolic filling.  4. There is normal right ventricular global systolic function.  5. Mild to moderately elevated right ventricular systolic pressure.    QUANTITATIVE DATA SUMMARY:    2D MEASUREMENTS:           Normal Ranges:  IVSd:            0.90 cm   (0.6-1.1cm)  LVPWd:           0.86 cm   (0.6-1.1cm)  LVIDd:           5.36 cm   (3.9-5.9cm)  LVIDs:           3.87 cm  LV Mass Index:   93.5 g/m2  LV % FS          27.9 %      LA VOLUME:                    Normal Ranges:  LA Vol A4C:        56.3 ml     (22+/-6mL/m2)  LA Vol A2C:        44.1 ml  LA Vol BP:         50.3 ml  LA Vol Index A4C:  30.6ml/m2  LA Vol Index A2C:  24.0 ml/m2  LA Vol Index BP:   27.3 ml/m2  LA Area A4C:       19.6 cm2  LA Area A2C:       17.5 cm2  LA Major Axis A4C: 5.8 cm  LA Major Axis A2C: 5.9 cm  LA Volume Index:   27.3 ml/m2  LA Vol A4C:        53.2 ml  LA Vol A2C:        42.4 ml  LA Vol Index BSA:  26.0 ml/m2      RA VOLUME BY A/L METHOD:          Normal Ranges:  RA Area A4C:             12.5 cm2      AORTA MEASUREMENTS:         Normal Ranges:  Ao Sinus, d:        3.60 cm (2.1-3.5cm)  Ao STJ, d:          2.80 cm (1.7-3.4cm)  Asc Ao, d:          2.90 cm (2.1-3.4cm)      LV SYSTOLIC FUNCTION BY 2D PLANIMETRY (MOD):  Normal Ranges:  EF-A4C View:                      21 % (>=55%)  EF-A2C View:                      43 %  EF-Biplane:                       33 %  EF-Visual:                        45 %  EF-Auto:                          39 %  LV EF Reported:                   45 %  Global Longitudinal Strain (GLS): 10 %      LV DIASTOLIC FUNCTION:           Normal Ranges:  MV Peak E:             1.00 m/s  (0.7-1.2 m/s)  MV e'                  0.051 m/s (>8.0)  MV lateral e'          0.06 m/s  MV medial e'           0.04 m/s  E/e' Ratio:            19.51     (<8.0)      AORTIC VALVE:                     Normal Ranges:  AoV Vmax:                1.22 m/s (<=1.7m/s)  AoV Peak P.9 mmHg (<20mmHg)  AoV Mean PG:             3.5 mmHg (1.7-11.5mmHg)  LVOT Max Derik:            1.05 m/s (<=1.1m/s)  AoV VTI:                 30.66 cm (18-25cm)  LVOT VTI:                24.14 cm  LVOT Diameter:           2.00 cm  (1.8-2.4cm)  AoV Area, VTI:           2.48 cm2 (2.5-5.5cm2)  AoV Area,Vmax:           2.73 cm2 (2.5-4.5cm2)  AoV Dimensionless Index: 0.79      RIGHT VENTRICLE:  RV Basal 3.17 cm  RV Mid   2.50 cm  RV Major 7.2 cm  TAPSE:   19.8 mm  RV s'    0.09 m/s      TRICUSPID VALVE/RVSP:          Normal Ranges:  Peak TR Velocity:     3.33  m/s  RV Syst Pressure:     47 mmHg  (< 30mmHg)  IVC Diam:             1.42 cm  TV e'                 0.1 m/s      PULMONIC VALVE:         Normal Ranges:  PV Accel Time:  97 msec (>120ms)      AORTA:  Asc Ao Diam 3.59 cm      [id number] Blake Doran DO  Electronically signed on 1/16/2025 at 11:59:58 AM        ** Final **      Guideline-Directed Medical Therapy:  -ARNI: Yes, describe: Entresto 24/26mg BID  -Beta Blocker: Yes, describe: carvedilol 6.25mg BID  -MRA: Yes, describe: spironolactone 12.5mg daily  -SGLT2i: Yes, describe: Jardiance 25mg daily    Secondary Prevention:  -The ASCVD Risk score (Lilliana SANDERSON, et al., 2019) failed to calculate for the following reasons:    Risk score cannot be calculated because patient has a medical history suggesting prior/existing ASCVD   -Aspirin 81mg? yes  -Statin?: Yes, describe: atorvastatin 80mg daily  -HTN?: Yes, describe: controlled    CURRENT PHARMACOTHERAPY:   Entresto 24/26mg BID  Carvedilol 6.25mg twice daily  Spironolactone 12.5mg daily  Jardiance 25mg daily  Furosemide 20mg daily    Affordability: PAP active  Adherence/Compliance: 3-4 in last month have missed nightly doses, uses pill box  Adverse Effects: No significant changes with Entresto, none reported    Monitoring Weights at Home: Yes  Home Weight Recordings: scale got broken, cannot measure currently    Past In Office Weight Readings:   Wt Readings from Last 6 Encounters:   12/30/24 83.9 kg (185 lb)   12/02/24 83.5 kg (184 lb)   10/23/24 86.2 kg (190 lb)   10/09/24 89.4 kg (197 lb)   08/30/24 88.9 kg (196 lb)   05/30/24 88 kg (194 lb)     Monitoring Blood Pressure at Home: No- patient states she will get a BP machine before next follow up     Past In Office BP Readings:   BP Readings from Last 6 Encounters:   12/30/24 128/62   12/02/24 124/72   10/23/24 126/62   10/09/24 120/60   08/30/24 138/64   05/30/24 124/70     HEALTH MANAGEMENT    Maintaining fluid restriction (<2 L/day): N/A  Edema/swelling: None related to  meds, pt reports sharp pain and slight swelling in ankle  Shortness of breath: No  Trouble sleeping/lying down: No  Dry/hacking cough: None related to medications, pt reports chronic dry cough  Recent Hospitalizations: No    EDUCATION/COUNSELING:   - Counseled patient on MOA, expectations, duration of therapy, contraindications, administration, and monitoring parameters  - Counseled patient on lifestyle modifications that can decrease your risk of having complications (smoking cessation, losing weight, daily weights, vaccines)  - Counseled patient on fluid intake and weight management. Recommended to not consume more than 2 liters of fliuids per day. If they have gained more than 2-3 pounds within a 24 hours period (or 5 pounds in a week), contact their cardiologist  - Answered all patient questions and concerns     DISCUSSION/NOTES:   Patient reports no significant side effects related to medications. Patient reports no significant s/sx of CHF. Patient is on GDMT. She recently switched from losartan to Entresto. Entresto 24-26mg BID is appropriate based on K, Scr, and last BP. Jardiance 25mg daily is appropriate. Carvedilol 6.25mg BID is appropriate based on HR of 81. Spironolactone 12.5mg daily is appropriate based on K, and Scr.   Patient reports no s/sx of bleeding, and no recent falls. Patient does report chronic dizziness, with last episode occurring when she got out of the car today. Patient is at increased risk for falls due to dizziness. This problem will be discussed further with PCP due to not being related to BP changes. Eliquis 5mg BID is appropriate given age, wt, and Scr.   Patient reports missing 3-4 doses of nightly medications in the last month. She currently uses a pillbox. Recommended setting an alarm to remember on her phone. She states she needs her son to fix her phone alarm before utilizing adherence measure.   Patient does not have BP cuff at home. She verbalized understanding to go to a  pharmacy within the next month to start measuring daily in the morning and bring the BP measurements to the next visit. Patient also does not have a working scale for weights. Patient agreed to fix scale, or get a new one to follow with weight.     ASSESSMENT:    Assessment/Plan   Problem List Items Addressed This Visit       Atrial fibrillation (Multi) (Chronic)     Eliquis 5mg BID is appropriate given age, wt, and Scr.         Heart failure with improved ejection fraction (HFimpEF)     Patient is on GDMT. Entresto 24-26mg BID is appropriate based on K, Scr, and last BP. Jardiance 25mg daily is appropriate. Carvedilol 6.25mg BID is appropriate based on HR of 81. Spironolactone 12.5mg daily is appropriate based on K, and Scr.           RECOMMENDATIONS/PLAN:  CONTINUE:   Entresto 24/26mg BID  Carvedilol 6.25mg twice daily  Spironolactone 12.5mg daily  Jardiance 25mg daily  Furosemide 20mg daily  Eliquis 5mg BID    Last Appnt with Referring Provider: 12/30/24  Next Appnt with Referring Provider: 3/24/25  Clinical Pharmacist follow up: 3 months May 1st at 1PM  VAF/Application Expiration: Yes    Date: 1/10/26  Type of Encounter: [facility]  Pharmacy Resident    Verbal consent to manage patient's drug therapy was obtained from the patient . They were informed they may decline to participate or withdraw from participation in pharmacy services at any time.    Continue all meds under the continuation of care with the referring provider and clinical pharmacy team.

## 2025-02-06 ENCOUNTER — APPOINTMENT (OUTPATIENT)
Dept: PHARMACY | Facility: HOSPITAL | Age: 75
End: 2025-02-06
Payer: MEDICARE

## 2025-02-06 DIAGNOSIS — I48.0 PAROXYSMAL ATRIAL FIBRILLATION (MULTI): ICD-10-CM

## 2025-02-06 DIAGNOSIS — I50.32 HEART FAILURE WITH IMPROVED EJECTION FRACTION (HFIMPEF): ICD-10-CM

## 2025-02-06 NOTE — Clinical Note
Christian Escoto,  Today was a follow up visit with Victorina to talk about her heart medications. Patient was successfully transitioned from losartan to Entresto. Patient reports tolerating her heart failure regimen well reporting no adverse effects and denies s/s of worsening heart failure. Patient reports she will get BP machine and will start taking BP in the next month. Patient is tolerating Eliquis as well, she denies s/s of bleeding. Plan to continue current regimen and follow up in 3 months. Thank you!

## 2025-02-06 NOTE — ASSESSMENT & PLAN NOTE
Patient is on GDMT. Entresto 24-26mg BID is appropriate based on K, Scr, and last BP. Jardiance 25mg daily is appropriate. Carvedilol 6.25mg BID is appropriate based on HR of 81. Spironolactone 12.5mg daily is appropriate based on K, and Scr.

## 2025-02-25 ENCOUNTER — TELEPHONE (OUTPATIENT)
Dept: PRIMARY CARE | Facility: CLINIC | Age: 75
End: 2025-02-25
Payer: MEDICARE

## 2025-02-25 NOTE — TELEPHONE ENCOUNTER
The patient stated had the cough for a long time and it as worse last night. The patient stated that the cough was so bad that her chest started hurting. The patient stated she can swallow pills but threw up the liquid medication. The patient asked where to get the flu and covid test, I stated at a urgent care or at a drug store. She stated she is going to have her son give it to her, she will call back with the results

## 2025-02-25 NOTE — TELEPHONE ENCOUNTER
Chief Complaint: Bronchitis     Symptoms: fever, cough     Duration: 2 days     Treatments:    Patient Requesting: Antibiotic     Did the Patient have the covid Vaccine:    Pharmacy: [facility] [place]     Covid Tested: did not test

## 2025-03-03 ENCOUNTER — APPOINTMENT (OUTPATIENT)
Dept: PRIMARY CARE | Facility: CLINIC | Age: 75
End: 2025-03-03
Payer: MEDICARE

## 2025-03-11 ENCOUNTER — APPOINTMENT (OUTPATIENT)
Dept: PRIMARY CARE | Facility: CLINIC | Age: 75
End: 2025-03-11
Payer: MEDICARE

## 2025-03-11 VITALS
WEIGHT: 164 LBS | BODY MASS INDEX: 30.18 KG/M2 | DIASTOLIC BLOOD PRESSURE: 73 MMHG | HEART RATE: 63 BPM | OXYGEN SATURATION: 96 % | HEIGHT: 62 IN | SYSTOLIC BLOOD PRESSURE: 144 MMHG

## 2025-03-11 DIAGNOSIS — R63.4 UNINTENTIONAL WEIGHT LOSS: ICD-10-CM

## 2025-03-11 DIAGNOSIS — F33.42 RECURRENT MAJOR DEPRESSIVE DISORDER, IN FULL REMISSION (CMS-HCC): ICD-10-CM

## 2025-03-11 DIAGNOSIS — N18.31 TYPE 2 DIABETES MELLITUS WITH STAGE 3A CHRONIC KIDNEY DISEASE, WITHOUT LONG-TERM CURRENT USE OF INSULIN (MULTI): ICD-10-CM

## 2025-03-11 DIAGNOSIS — I48.91 ATRIAL FIBRILLATION, UNSPECIFIED TYPE (MULTI): ICD-10-CM

## 2025-03-11 DIAGNOSIS — E11.69 TYPE 2 DIABETES MELLITUS WITH OTHER SPECIFIED COMPLICATION, WITHOUT LONG-TERM CURRENT USE OF INSULIN: Primary | ICD-10-CM

## 2025-03-11 DIAGNOSIS — E03.9 ACQUIRED HYPOTHYROIDISM: Chronic | ICD-10-CM

## 2025-03-11 DIAGNOSIS — I10 ESSENTIAL HYPERTENSION: ICD-10-CM

## 2025-03-11 DIAGNOSIS — J44.1 COPD EXACERBATION (MULTI): ICD-10-CM

## 2025-03-11 DIAGNOSIS — E11.22 TYPE 2 DIABETES MELLITUS WITH STAGE 3A CHRONIC KIDNEY DISEASE, WITHOUT LONG-TERM CURRENT USE OF INSULIN (MULTI): ICD-10-CM

## 2025-03-11 DIAGNOSIS — E78.2 MIXED HYPERLIPIDEMIA: ICD-10-CM

## 2025-03-11 DIAGNOSIS — J44.1 ACUTE EXACERBATION OF CHRONIC OBSTRUCTIVE PULMONARY DISEASE (MULTI): ICD-10-CM

## 2025-03-11 DIAGNOSIS — Z53.20 COLON CANCER SCREENING DECLINED: ICD-10-CM

## 2025-03-11 DIAGNOSIS — E78.5 DM TYPE 2 WITH DIABETIC DYSLIPIDEMIA (MULTI): Chronic | ICD-10-CM

## 2025-03-11 DIAGNOSIS — E11.69 DM TYPE 2 WITH DIABETIC DYSLIPIDEMIA (MULTI): Chronic | ICD-10-CM

## 2025-03-11 DIAGNOSIS — R19.8 CHANGE IN BOWEL FUNCTION: ICD-10-CM

## 2025-03-11 DIAGNOSIS — G47.33 OBSTRUCTIVE SLEEP APNEA (ADULT) (PEDIATRIC): ICD-10-CM

## 2025-03-11 DIAGNOSIS — F32.1 MODERATE MAJOR DEPRESSION (MULTI): ICD-10-CM

## 2025-03-11 DIAGNOSIS — R05.3 CHRONIC COUGH: ICD-10-CM

## 2025-03-11 DIAGNOSIS — I50.33 ACUTE ON CHRONIC DIASTOLIC (CONGESTIVE) HEART FAILURE: ICD-10-CM

## 2025-03-11 DIAGNOSIS — Z00.00 HEALTH CARE MAINTENANCE: ICD-10-CM

## 2025-03-11 DIAGNOSIS — K92.1 BLOOD IN STOOL: ICD-10-CM

## 2025-03-11 LAB — POC HEMOGLOBIN A1C: 6.7 % (ref 4.2–6.5)

## 2025-03-11 PROCEDURE — 1036F TOBACCO NON-USER: CPT

## 2025-03-11 PROCEDURE — 83036 HEMOGLOBIN GLYCOSYLATED A1C: CPT

## 2025-03-11 PROCEDURE — 99214 OFFICE O/P EST MOD 30 MIN: CPT

## 2025-03-11 PROCEDURE — 1160F RVW MEDS BY RX/DR IN RCRD: CPT

## 2025-03-11 PROCEDURE — 3078F DIAST BP <80 MM HG: CPT

## 2025-03-11 PROCEDURE — G2211 COMPLEX E/M VISIT ADD ON: HCPCS

## 2025-03-11 PROCEDURE — 1157F ADVNC CARE PLAN IN RCRD: CPT

## 2025-03-11 PROCEDURE — 1159F MED LIST DOCD IN RCRD: CPT

## 2025-03-11 PROCEDURE — 3077F SYST BP >= 140 MM HG: CPT

## 2025-03-11 PROCEDURE — 1123F ACP DISCUSS/DSCN MKR DOCD: CPT

## 2025-03-11 ASSESSMENT — ENCOUNTER SYMPTOMS
COUGH: 1
BACK PAIN: 1
LOSS OF SENSATION IN FEET: 0
BLOOD IN STOOL: 1
OCCASIONAL FEELINGS OF UNSTEADINESS: 0
UNEXPECTED WEIGHT CHANGE: 1
DEPRESSION: 0
FATIGUE: 1

## 2025-03-11 ASSESSMENT — PATIENT HEALTH QUESTIONNAIRE - PHQ9
SUM OF ALL RESPONSES TO PHQ9 QUESTIONS 1 AND 2: 0
1. LITTLE INTEREST OR PLEASURE IN DOING THINGS: NOT AT ALL
2. FEELING DOWN, DEPRESSED OR HOPELESS: NOT AT ALL

## 2025-03-11 NOTE — ASSESSMENT & PLAN NOTE
Patient is down 20lbs since last visit. States has not tried to loose weight.   10/9/24 weight 197lbs  12/2/24 weight 184lbs  3/11/25 weight 164 lbs  Was sick for 3-4 days, was unable to eat for that time only able to intake Gatorade and water. States still can not taste anything and diet is still minimal.  Reports a lot of diarrhea, sometimes states has bright red blood in stool. Reports has been having diarrhea off and on. Endorses does not want to do a colonoscopy. Had last colonoscopy in 2019 but stated needed repeat due to ineffective bowel prep.   Reports he is tired all the time, has no energy.   Lab work ordered, CT scan ordered due to symptoms and weight loss.

## 2025-03-11 NOTE — PROGRESS NOTES
"Subjective   Patient ID: Victorina Adamson is a 75 y.o. female who presents for Follow-up (3 month follow up, A1c, discuss unintentional weight loss, down 20 pounds in 3 months. Patient would also like to discuss ongoing cough and left ankle pain.).    Past Medical, Surgical, and Family History reviewed and updated in chart.     Reviewed all medications by prescribing practitioner or clinical pharmacist (such as prescriptions, OTCs, herbal therapies and supplements) and documented in the medical record.    HPI   75 yof in office for follow up.     Patient is down 20lbs since last visit. States has not tried to loose weight.   10/9/24 weight 197lbs  12/2/24 weight 184lbs  3/11/25 weight 164 lbs  Was sick for 3-4 days, was unable to eat for that time only able to intake Gatorade and water. States still can not taste anything and diet is still minimal.  Reports a lot of diarrhea, sometimes states has bright red blood in stool. Reports has been having diarrhea off and on. Endorses does not want to do a colonoscopy. Had last colonoscopy in 2019 but stated needed repeat due to ineffective bowel prep.   Reports he is tired all the time, has no energy.     Left ankle, medial foot pain, started 2 weeks ago. States stretches a lot and thought she stretched and twists it for ROM. Unsure if she hurt it in some way.     Review of Systems   Constitutional:  Positive for fatigue and unexpected weight change.   Respiratory:  Positive for cough.    Gastrointestinal:  Positive for blood in stool.   Musculoskeletal:  Positive for back pain.       Objective   /73   Pulse 63   Ht 1.575 m (5' 2.01\")   Wt 74.4 kg (164 lb)   SpO2 96%   BMI 29.99 kg/m²     Physical Exam  Constitutional:       Comments: pale   HENT:      Head: Normocephalic and atraumatic.   Cardiovascular:      Rate and Rhythm: Normal rate and regular rhythm.      Pulses: Normal pulses.      Heart sounds: Normal heart sounds.   Pulmonary:      Effort: Pulmonary " effort is normal.      Breath sounds: Normal breath sounds.   Musculoskeletal:      Comments: Cane for ambulation   Neurological:      Mental Status: She is alert.         Assessment/Plan   Problem List Items Addressed This Visit       Type 2 diabetes mellitus with other specified complication - Primary     Check sugars once daily. Endorses diet has been better. A1c 6.4 today, last was 7.1          Relevant Orders    POCT glycosylated hemoglobin (Hb A1C) manually resulted (Completed)    Essential hypertension    Relevant Orders    CBC and Auto Differential    Mixed hyperlipidemia    Hypothyroidism (Chronic)    Relevant Orders    TSH with reflex to Free T4 if abnormal    Obstructive sleep apnea (adult) (pediatric)     No currently using CPAP machine          Atrial fibrillation (Multi) (Chronic)    Recurrent major depressive disorder (CMS-HCC)     Assessed and stable. PHQ score 0.         Acute exacerbation of chronic obstructive pulmonary disease (Multi)     Assessed and stable. Shortness of breath with exertion at times, has PRN albuterol as needed.          DM type 2 with diabetic dyslipidemia (Multi) (Chronic)     Check sugars once daily. Endorses diet has been better. A1c 6.4 today, last was 7.1  Continues on atorvastatin 80mg, lipid panel ordered today.         BMI 29.0-29.9,adult    Acute on chronic diastolic (congestive) heart failure    Relevant Orders    CBC and Auto Differential    Comprehensive Metabolic Panel    Moderate major depression (Multi)     Assessed and stable. PHQ score 0.         Type 2 diabetes mellitus with stage 3a chronic kidney disease, without long-term current use of insulin (Multi)     Check sugars once daily. Endorses diet has been better. A1c 6.4 today, last was 7.1. on jardiance 25mg daily. Last GFR 70, repeat labs ordered today.          Relevant Orders    Albumin-Creatinine Ratio, Urine Random    Unintentional weight loss     Patient is down 20lbs since last visit. States has not  tried to loose weight.   10/9/24 weight 197lbs  12/2/24 weight 184lbs  3/11/25 weight 164 lbs  Was sick for 3-4 days, was unable to eat for that time only able to intake Gatorade and water. States still can not taste anything and diet is still minimal.  Reports a lot of diarrhea, sometimes states has bright red blood in stool. Reports has been having diarrhea off and on. Endorses does not want to do a colonoscopy. Had last colonoscopy in 2019 but stated needed repeat due to ineffective bowel prep.   Reports he is tired all the time, has no energy.   Lab work ordered, CT scan ordered due to symptoms and weight loss.          Relevant Orders    CBC and Auto Differential    Comprehensive Metabolic Panel    Sedimentation Rate    C-reactive protein    Urinalysis with Reflex Microscopic    CT chest abdomen pelvis wo IV contrast     Other Visit Diagnoses       Health care maintenance        COPD exacerbation (Multi)        Chronic cough        Relevant Orders    CT chest abdomen pelvis wo IV contrast    Blood in stool        Relevant Orders    CT chest abdomen pelvis wo IV contrast    Fecal Occult Blood Immunoassy    Colon cancer screening declined        Change in bowel function        Relevant Orders    CT chest abdomen pelvis wo IV contrast    Fecal Occult Blood Immunoassy

## 2025-03-11 NOTE — PATIENT INSTRUCTIONS
I recommend getting insoles for your shoes to help with your flat foot to see if that will help with foot pain.    I ordered blood work for you to get and for a CT scan.       Assessment/Plan   Problem List Items Addressed This Visit       Type 2 diabetes mellitus with other specified complication - Primary     Check sugars once daily. Endorses diet has been better. A1c 6.4 today, last was 7.1          Relevant Orders    POCT glycosylated hemoglobin (Hb A1C) manually resulted (Completed)    Essential hypertension    Relevant Orders    CBC and Auto Differential    Mixed hyperlipidemia    Hypothyroidism (Chronic)    Relevant Orders    TSH with reflex to Free T4 if abnormal    Obstructive sleep apnea (adult) (pediatric)     No currently using CPAP machine          Atrial fibrillation (Multi) (Chronic)    Recurrent major depressive disorder (CMS-HCC)     Assessed and stable. PHQ score 0.         Acute exacerbation of chronic obstructive pulmonary disease (Multi)     Assessed and stable. Shortness of breath with exertion at times, has PRN albuterol as needed.          DM type 2 with diabetic dyslipidemia (Multi) (Chronic)     Check sugars once daily. Endorses diet has been better. A1c 6.4 today, last was 7.1  Continues on atorvastatin 80mg, lipid panel ordered today.         BMI 29.0-29.9,adult    Acute on chronic diastolic (congestive) heart failure    Relevant Orders    CBC and Auto Differential    Comprehensive Metabolic Panel    Moderate major depression (Multi)     Assessed and stable. PHQ score 0.         Type 2 diabetes mellitus with stage 3a chronic kidney disease, without long-term current use of insulin (Multi)     Check sugars once daily. Endorses diet has been better. A1c 6.4 today, last was 7.1. on jardiance 25mg daily. Last GFR 70, repeat labs ordered today.          Relevant Orders    Albumin-Creatinine Ratio, Urine Random    Unintentional weight loss     Patient is down 20lbs since last visit. States has  not tried to loose weight.   10/9/24 weight 197lbs  12/2/24 weight 184lbs  3/11/25 weight 164 lbs  Was sick for 3-4 days, was unable to eat for that time only able to intake Gatorade and water. States still can not taste anything and diet is still minimal.  Reports a lot of diarrhea, sometimes states has bright red blood in stool. Reports has been having diarrhea off and on. Endorses does not want to do a colonoscopy. Had last colonoscopy in 2019 but stated needed repeat due to ineffective bowel prep.   Reports he is tired all the time, has no energy.   Lab work ordered, CT scan ordered due to symptoms and weight loss.          Relevant Orders    CBC and Auto Differential    Comprehensive Metabolic Panel    Sedimentation Rate    C-reactive protein    Urinalysis with Reflex Microscopic    CT chest abdomen pelvis wo IV contrast     Other Visit Diagnoses       Health care maintenance        COPD exacerbation (Multi)        Chronic cough        Relevant Orders    CT chest abdomen pelvis wo IV contrast    Blood in stool        Relevant Orders    CT chest abdomen pelvis wo IV contrast    Fecal Occult Blood Immunoassy    Colon cancer screening declined        Change in bowel function        Relevant Orders    CT chest abdomen pelvis wo IV contrast    Fecal Occult Blood Immunoassy

## 2025-03-11 NOTE — ASSESSMENT & PLAN NOTE
Check sugars once daily. Endorses diet has been better. A1c 6.4 today, last was 7.1  Continues on atorvastatin 80mg, lipid panel ordered today.

## 2025-03-11 NOTE — ASSESSMENT & PLAN NOTE
Check sugars once daily. Endorses diet has been better. A1c 6.4 today, last was 7.1. on jardiance 25mg daily. Last GFR 70, repeat labs ordered today.

## 2025-03-12 LAB
ALBUMIN SERPL-MCNC: 3.6 G/DL (ref 3.6–5.1)
ALP SERPL-CCNC: 68 U/L (ref 37–153)
ALT SERPL-CCNC: 6 U/L (ref 6–29)
ANION GAP SERPL CALCULATED.4IONS-SCNC: 13 MMOL/L (CALC) (ref 7–17)
AST SERPL-CCNC: 16 U/L (ref 10–35)
BASOPHILS # BLD AUTO: 30 CELLS/UL (ref 0–200)
BASOPHILS NFR BLD AUTO: 0.3 %
BILIRUB SERPL-MCNC: 0.4 MG/DL (ref 0.2–1.2)
BUN SERPL-MCNC: 11 MG/DL (ref 7–25)
CALCIUM SERPL-MCNC: 7.4 MG/DL (ref 8.6–10.4)
CHLORIDE SERPL-SCNC: 104 MMOL/L (ref 98–110)
CO2 SERPL-SCNC: 27 MMOL/L (ref 20–32)
CREAT SERPL-MCNC: 0.75 MG/DL (ref 0.6–1)
CRP SERPL-MCNC: 11.4 MG/L
EGFRCR SERPLBLD CKD-EPI 2021: 82 ML/MIN/1.73M2
EOSINOPHIL # BLD AUTO: 323 CELLS/UL (ref 15–500)
EOSINOPHIL NFR BLD AUTO: 3.2 %
ERYTHROCYTE [DISTWIDTH] IN BLOOD BY AUTOMATED COUNT: 17.7 % (ref 11–15)
ERYTHROCYTE [SEDIMENTATION RATE] IN BLOOD BY WESTERGREN METHOD: 22 MM/H
GLUCOSE SERPL-MCNC: 133 MG/DL (ref 65–99)
HCT VFR BLD AUTO: 34.2 % (ref 35–45)
HGB BLD-MCNC: 9.4 G/DL (ref 11.7–15.5)
LYMPHOCYTES # BLD AUTO: 1929 CELLS/UL (ref 850–3900)
LYMPHOCYTES NFR BLD AUTO: 19.1 %
MCH RBC QN AUTO: 19.6 PG (ref 27–33)
MCHC RBC AUTO-ENTMCNC: 27.5 G/DL (ref 32–36)
MCV RBC AUTO: 71.3 FL (ref 80–100)
MONOCYTES # BLD AUTO: 596 CELLS/UL (ref 200–950)
MONOCYTES NFR BLD AUTO: 5.9 %
NEUTROPHILS # BLD AUTO: 7222 CELLS/UL (ref 1500–7800)
NEUTROPHILS NFR BLD AUTO: 71.5 %
PLATELET # BLD AUTO: 527 THOUSAND/UL (ref 140–400)
PMV BLD REES-ECKER: 10.6 FL (ref 7.5–12.5)
POTASSIUM SERPL-SCNC: 3.1 MMOL/L (ref 3.5–5.3)
PROT SERPL-MCNC: 6.5 G/DL (ref 6.1–8.1)
RBC # BLD AUTO: 4.8 MILLION/UL (ref 3.8–5.1)
SODIUM SERPL-SCNC: 144 MMOL/L (ref 135–146)
TSH SERPL-ACNC: 3.94 MIU/L (ref 0.4–4.5)
WBC # BLD AUTO: 10.1 THOUSAND/UL (ref 3.8–10.8)

## 2025-03-24 PROBLEM — S21.109A: Status: ACTIVE | Noted: 2023-02-28

## 2025-03-24 PROBLEM — E11.9 DIABETES MELLITUS (MULTI): Status: ACTIVE | Noted: 2024-01-18

## 2025-03-24 PROBLEM — R06.09 DYSPNEA ON EXERTION: Status: ACTIVE | Noted: 2022-02-18

## 2025-03-28 ENCOUNTER — APPOINTMENT (OUTPATIENT)
Dept: RADIOLOGY | Facility: HOSPITAL | Age: 75
End: 2025-03-28
Payer: MEDICARE

## 2025-03-28 LAB
ALBUMIN/CREAT UR: 45 MG/G CREAT
APPEARANCE UR: ABNORMAL
BACTERIA #/AREA URNS HPF: ABNORMAL /HPF
BILIRUB UR QL STRIP: NEGATIVE
COLOR UR: YELLOW
CREAT UR-MCNC: 40 MG/DL (ref 20–275)
GLUCOSE UR QL STRIP: ABNORMAL
HGB UR QL STRIP: ABNORMAL
HYALINE CASTS #/AREA URNS LPF: ABNORMAL /LPF
KETONES UR QL STRIP: NEGATIVE
LEUKOCYTE ESTERASE UR QL STRIP: ABNORMAL
MICROALBUMIN UR-MCNC: 1.8 MG/DL
NITRITE UR QL STRIP: NEGATIVE
PH UR STRIP: 6 [PH] (ref 5–8)
PROT UR QL STRIP: NEGATIVE
RBC #/AREA URNS HPF: ABNORMAL /HPF
SERVICE CMNT-IMP: ABNORMAL
SP GR UR STRIP: 1.01 (ref 1–1.03)
SQUAMOUS #/AREA URNS HPF: ABNORMAL /HPF
WBC #/AREA URNS HPF: ABNORMAL /HPF

## 2025-04-01 ENCOUNTER — TELEPHONE (OUTPATIENT)
Dept: PRIMARY CARE | Facility: CLINIC | Age: 75
End: 2025-04-01
Payer: MEDICARE

## 2025-04-01 DIAGNOSIS — E87.6 LOW BLOOD POTASSIUM: Primary | ICD-10-CM

## 2025-04-01 DIAGNOSIS — N39.0 URINARY TRACT INFECTION WITHOUT HEMATURIA, SITE UNSPECIFIED: ICD-10-CM

## 2025-04-01 RX ORDER — POTASSIUM CHLORIDE 20 MEQ/1
20 TABLET, EXTENDED RELEASE ORAL DAILY
Qty: 7 TABLET | Refills: 0 | Status: SHIPPED | OUTPATIENT
Start: 2025-04-01 | End: 2025-04-08

## 2025-04-01 RX ORDER — NITROFURANTOIN 25; 75 MG/1; MG/1
100 CAPSULE ORAL 2 TIMES DAILY
Qty: 14 CAPSULE | Refills: 0 | Status: SHIPPED | OUTPATIENT
Start: 2025-04-01 | End: 2025-04-08

## 2025-04-01 NOTE — TELEPHONE ENCOUNTER
----- Message from Jennifer Aguirre sent at 4/1/2025  3:25 PM EDT -----  Urinalysis shows bacteria and leukocytes is patient having any urinary symptoms? Potassium and calcium levels are low, I would like her to take a daily over the counter supplement of calcium and I will send over a daily dose of potassium to take for 1 week. Inflammation marker is slightly elevated but with the urine results it could be due to that. I will send over an antibiotic to take for that.

## 2025-04-08 PROCEDURE — RXMED WILLOW AMBULATORY MEDICATION CHARGE

## 2025-04-14 ENCOUNTER — PHARMACY VISIT (OUTPATIENT)
Dept: PHARMACY | Facility: CLINIC | Age: 75
End: 2025-04-14
Payer: COMMERCIAL

## 2025-04-17 ENCOUNTER — HOSPITAL ENCOUNTER (OUTPATIENT)
Dept: RADIOLOGY | Facility: HOSPITAL | Age: 75
Discharge: HOME | End: 2025-04-17
Payer: MEDICARE

## 2025-04-17 DIAGNOSIS — R19.8 CHANGE IN BOWEL FUNCTION: ICD-10-CM

## 2025-04-17 DIAGNOSIS — R63.4 UNINTENTIONAL WEIGHT LOSS: ICD-10-CM

## 2025-04-17 DIAGNOSIS — K92.1 BLOOD IN STOOL: ICD-10-CM

## 2025-04-17 DIAGNOSIS — R05.3 CHRONIC COUGH: ICD-10-CM

## 2025-04-17 PROCEDURE — 74176 CT ABD & PELVIS W/O CONTRAST: CPT

## 2025-04-22 ENCOUNTER — TELEPHONE (OUTPATIENT)
Dept: PRIMARY CARE | Facility: CLINIC | Age: 75
End: 2025-04-22
Payer: MEDICARE

## 2025-04-22 NOTE — TELEPHONE ENCOUNTER
----- Message from Jennifer Aguirre sent at 4/22/2025  1:28 PM EDT -----  CT does not show anything that would concern me on the weight loss.  Has weight stayed stable? How has appetite been?  ----- Message -----  From: Interface, Radiology Results In  Sent: 4/19/2025   9:50 AM EDT  To: FRANCISCO Orellana-CNP

## 2025-05-01 ENCOUNTER — APPOINTMENT (OUTPATIENT)
Dept: PHARMACY | Facility: HOSPITAL | Age: 75
End: 2025-05-01
Payer: MEDICARE

## 2025-05-01 DIAGNOSIS — I50.32 HEART FAILURE WITH IMPROVED EJECTION FRACTION (HFIMPEF): ICD-10-CM

## 2025-05-01 DIAGNOSIS — I48.0 PAROXYSMAL ATRIAL FIBRILLATION (MULTI): ICD-10-CM

## 2025-05-01 DIAGNOSIS — K21.9 MILD ACID REFLUX: ICD-10-CM

## 2025-05-01 NOTE — Clinical Note
Christian Escoto,  Today I spoke with Victorina about her heart medications. There are concerns for adherence with medication, specifically with carvedilol and spironolactone. Patient is not taking spironolactone for unknown reason and she states she was told by PCP to stop carvedilol, however there is no documentation. I recommend to resume carvedilol and spironolactone as prescribed. Patient is tolerating Entresto, Jardiance and Eliquis well reporting adherence, no adverse effects and denies s/s of bleeding/worsening heart failure. Patient has not started monitoring BP at home as discussed previously at past appts. Plan to follow up in 1 month to assess adherence. Thank you!

## 2025-05-01 NOTE — PROGRESS NOTES
Pharmacist Clinic: Cardiology Management    Victorina Adamson is a 75 y.o. female was referred to Clinical Pharmacy Team for CHF and anticoagulation management.     Referring Provider: Tigist Calle APRN*    THIS IS A FOLLOW UP PATIENT APPOINTMENT. AT LAST VISIT ON 02/06/2025 WITH PHARMACIST (Zarina Dale/ Evelia Colindres).    Appointment was completed by Victorina who was reached at primary number.    REVIEW OF PAST APPNT (IF APPLICABLE):   Patient reports no significant side effects related to medications. Patient reports no significant s/sx of CHF. Patient is on GDMT. She recently switched from losartan to Entresto. Entresto 24-26mg twice daily is appropriate based on K, Scr, and last BP. Jardiance 25mg daily is appropriate. Carvedilol 6.25mg twice daily is appropriate based on HR of 81. Spironolactone 12.5mg daily is appropriate based on K, and Scr.   Patient reports no s/sx of bleeding, and no recent falls. Patient does report chronic dizziness, with last episode occurring when she got out of the car today. Patient is at increased risk for falls due to dizziness. This problem will be discussed further with PCP due to not being related to BP changes. Eliquis 5mg twice daily is appropriate given age, wt, and Scr.   Patient reports missing 3-4 doses of nightly medications in the last month. She currently uses a pillbox. Recommended setting an alarm to remember on her phone. She states she needs her son to fix her phone alarm before utilizing adherence measure.   Patient does not have BP cuff at home. She verbalized understanding to go to a pharmacy within the next month to start measuring daily in the morning and bring the BP measurements to the next visit. Patient also does not have a working scale for weights. Patient agreed to fix scale, or get a new one to follow with weight.     Allergies Reviewed? No    Allergies   Allergen Reactions    Gabapentin Unknown    Hydrocodone-Acetaminophen Unknown     Influenza Virus Vaccines Unknown    Lisinopril Cough    Oxycodone Unknown       Past Medical History:   Diagnosis Date    Acute upper respiratory infection, unspecified 02/11/2022    URI, acute    Anxiety disorder, unspecified     Anxiety and depression    Chronic tension-type headache, not intractable     Chronic tension headaches    Diverticulosis of intestine, part unspecified, without perforation or abscess without bleeding     Diverticulosis    Encounter for general adult medical examination without abnormal findings 09/20/2021    Medicare annual wellness visit, subsequent    Encounter for other screening for malignant neoplasm of breast 02/11/2021    Breast cancer screening    Encounter for preprocedural cardiovascular examination     Pre-operative cardiovascular examination    Encounter for screening for malignant neoplasm of colon 02/11/2021    Colon cancer screening    Lumbago with sciatica, right side 09/20/2021    Acute right-sided low back pain with right-sided sciatica    Major depressive disorder, single episode, mild (CMS-HCC) 09/20/2021    Depression, major, single episode, mild    Obesity, unspecified 06/22/2020    Obesity (BMI 30-39.9)    Other intervertebral disc degeneration, lumbar region 03/15/2022    Degeneration of intervertebral disc of lumbar region    Other specified health status 06/22/2020    Intolerance of continuous positive airway pressure (CPAP) ventilation    Other unilateral secondary osteoarthritis of hip     Other secondary osteoarthritis of right hip    Pain in right hip 10/02/2021    Hip pain, right    Personal history of other diseases of the digestive system     History of gastroesophageal reflux (GERD)    Personal history of other diseases of the digestive system     History of diverticulitis    Personal history of other diseases of the musculoskeletal system and connective tissue 06/08/2021    History of arthritis    Personal history of other diseases of the nervous system  and sense organs 03/28/2022    History of acute otitis media    Personal history of other infectious and parasitic diseases 06/08/2021    History of candidiasis of vagina    Personal history of other infectious and parasitic diseases 06/18/2020    History of herpes zoster    Personal history of other mental and behavioral disorders 03/15/2022    History of depression    Personal history of other specified conditions     History of vertigo    Personal history of other specified conditions 02/11/2021    History of dysphagia    Personal history of other specified conditions 06/22/2020    History of insomnia    Spinal stenosis, lumbosacral region 11/23/2021    Spinal stenosis of lumbosacral region    Spondylosis without myelopathy or radiculopathy, lumbar region 11/30/2021    Lumbar spondylosis    Unspecified fall, subsequent encounter 09/20/2021    Fall at home, subsequent encounter       Current Outpatient Medications on File Prior to Visit   Medication Sig Dispense Refill    acetaminophen (Tylenol) 500 mg tablet Take 2 tablets (1,000 mg) by mouth every 6 hours if needed for mild pain (1 - 3).      albuterol 90 mcg/actuation inhaler Inhale 2 puffs every 4 hours if needed for wheezing or shortness of breath (inhale 2 puffs into the lungs every 4 to 6 hours as needed). 18 g 11    ammonium lactate (Lac-Hydrin) 12 % lotion Apply topically if needed for dry skin. 2258 g 2    apixaban (Eliquis) 5 mg tablet Take 1 tablet (5 mg) by mouth 2 times a day. 180 tablet 3    aspirin 81 mg EC tablet Take 1 tablet (81 mg) by mouth once daily.      atorvastatin (Lipitor) 80 mg tablet TAKE 1 TABLET BY MOUTH AT BEDTIME 90 tablet 3    blood glucose control, low (True Metrix Level 1) solution Apply 1 each topically see administration instructions. 1 each 0    carvedilol (Coreg) 6.25 mg tablet Take 1 tablet (6.25 mg) by mouth 2 times daily (morning and late afternoon). 180 tablet 3    DropSafe Alcohol Prep Pads pads, medicated USE AS  DIRECTED 100 each 10    empagliflozin (Jardiance) 25 mg tablet Take 1 tablet (25 mg) by mouth once daily. 90 tablet 3    ezetimibe (Zetia) 10 mg tablet Take 1 tablet (10 mg) by mouth once daily. 90 tablet 3    furosemide (Lasix) 20 mg tablet Take 1 tablet (20 mg) by mouth once daily. 90 tablet 1    glimepiride (AmaryL) 4 mg tablet Take 1 tablet (4 mg) by mouth once daily in the morning. Take before meals. 30 tablet 11    levothyroxine (Synthroid, Levoxyl) 50 mcg tablet Take 1 tablet (50 mcg) by mouth once daily. 90 tablet 3    losartan (Cozaar) 25 mg tablet Take 1 tablet (25 mg) by mouth once daily. 90 tablet 2    meclizine (Antivert) 12.5 mg tablet Take 2 tablets (25 mg) by mouth 3 times a day as needed for dizziness. 90 tablet 11    metFORMIN (Glucophage) 1,000 mg tablet TAKE 1 TABLET BY MOUTH IN THE MORNING AND AT BEDTIME 180 tablet 3    pantoprazole (ProtoNix) 40 mg EC tablet Take 1 tablet (40 mg) by mouth once daily. 90 tablet 1    pregabalin (Lyrica) 50 mg capsule Take 1 capsule (50 mg) by mouth 2 times a day. 180 capsule 1    sacubitriL-valsartan (Entresto) 24-26 mg tablet Take 1 tablet by mouth 2 times a day. 180 tablet 3    sertraline (Zoloft) 100 mg tablet Take 1 tablet (100 mg) by mouth once daily. 90 tablet 1    spironolactone (Aldactone) 25 mg tablet Take 0.5 tablets (12.5 mg) by mouth once daily. 45 tablet 1    triamcinolone (Kenalog) 0.1 % cream Apply topically 2 times a day. Apply to affected area 1-2 times daily as needed. Avoid face and groin. 30 g 2    True Metrix Air Glucose Meter kit       True Metrix Glucose Test Strip strip USE AS DIRECTED 200 strip 10    TRUEplus Lancets 33 gauge misc USE AS DIRECTED 200 each 10     No current facility-administered medications on file prior to visit.         RELEVANT LAB RESULTS:  Lab Results   Component Value Date    BILITOT 0.4 03/11/2025    CALCIUM 7.4 (L) 03/11/2025    CO2 27 03/11/2025     03/11/2025    CREATININE 0.75 03/11/2025    GLUCOSE 133 (H)  "03/11/2025    ALKPHOS 68 03/11/2025    K 3.1 (L) 03/11/2025    PROT 6.5 03/11/2025     03/11/2025    AST 16 03/11/2025    ALT 6 03/11/2025    BUN 11 03/11/2025    ANIONGAP 13 03/11/2025    MG 1.47 (L) 03/16/2023    PHOS 4.8 05/03/2022    ALBUMIN 3.6 03/11/2025    GFRF 78 05/10/2023    GFRMALE CANCELED 02/24/2023     Lab Results   Component Value Date    TRIG 126 09/16/2024    CHOL 79 09/16/2024    LDLCALC 15 09/16/2024    HDL 38.8 09/16/2024     No results found for: \"BMCBC\", \"CBCDIF\"     PHARMACEUTICAL ASSESSMENT:  MEDICATION RECONCILIATION    Drug Interactions? No    Medication Documentation Review Audit       Reviewed by ACACIA Orellana (Nurse Practitioner) on 03/11/25 at 0738      Medication Order Taking? Sig Documenting Provider Last Dose Status   acetaminophen (Tylenol) 500 mg tablet [id number] No Take 2 tablets (1,000 mg) by mouth every 6 hours if needed for mild pain (1 - 3). Historical Provider, MD Not Taking Active   albuterol 90 mcg/actuation inhaler [id number] No Inhale 2 puffs every 4 hours if needed for wheezing or shortness of breath (inhale 2 puffs into the lungs every 4 to 6 hours as needed). Sean Romero MD Taking Active   ammonium lactate (Lac-Hydrin) 12 % lotion [id number]  Apply topically if needed for dry skin. ACACIA Orellana  Active   apixaban (Eliquis) 5 mg tablet [id number]  Take 1 tablet (5 mg) by mouth 2 times a day. ACACIA Alaniz  Active   aspirin 81 mg EC tablet [id number]  Take 1 tablet (81 mg) by mouth once daily. Historical Provider, MD  Active   atorvastatin (Lipitor) 80 mg tablet [id number] No TAKE 1 TABLET BY MOUTH AT BEDTIME ACACIA Williamson Taking Active   blood glucose control, low (True Metrix Level 1) solution [id number] No Apply 1 each topically see administration instructions. Sean Persaud, DO Taking Active   carvedilol (Coreg) 6.25 mg tablet [id number]  Take 1 tablet (6.25 mg) by mouth 2 times daily (morning and late afternoon). " ACACIA Alaniz  Active   DropSafe Alcohol Prep Pads pads, medicated [id number] No USE AS DIRECTED Amy Cummings DO Taking Active   empagliflozin (Jardiance) 25 mg [id number]  Take 1 tablet (25 mg) by mouth once daily. ACACIA Alaniz  Active   ezetimibe (Zetia) 10 mg tablet [id number]  Take 1 tablet (10 mg) by mouth once daily. ACACIA Alaniz  Active   furosemide (Lasix) 20 mg tablet [id number]  Take 1 tablet (20 mg) by mouth once daily. ACACIA Alaniz  Active   glimepiride (AmaryL) 4 mg tablet [id number]  Take 1 tablet (4 mg) by mouth once daily in the morning. Take before meals. ACACIA Orellana  Active   levothyroxine (Synthroid, Levoxyl) 50 mcg tablet [id number] No Take 1 tablet (50 mcg) by mouth once daily. ACACIA Orellana Taking Active   losartan (Cozaar) 25 mg tablet [id number]  Take 1 tablet (25 mg) by mouth once daily. ACACIA Alaniz  Active   meclizine (Antivert) 12.5 mg tablet [id number] No Take 2 tablets (25 mg) by mouth 3 times a day as needed for dizziness. ACACIA Orellana Taking Active   metFORMIN (Glucophage) 1,000 mg tablet [id number] No TAKE 1 TABLET BY MOUTH IN THE MORNING AND AT BEDTIME Amy Cummings DO Taking Active   pantoprazole (ProtoNix) 40 mg EC tablet [id number]  Take 1 tablet (40 mg) by mouth once daily. ACACIA Orellana  Active   pregabalin (Lyrica) 50 mg capsule [id number]  Take 1 capsule (50 mg) by mouth 2 times a day. ACACIA Orellana  Active   sacubitriL-valsartan (Entresto) 24-26 mg tablet [id number]  Take 1 tablet by mouth 2 times a day. ACACIA Alaniz  Active   sertraline (Zoloft) 100 mg tablet [id number]  Take 1 tablet (100 mg) by mouth once daily. FRANCISCO Orellana-CNP  Active   spironolactone (Aldactone) 25 mg tablet [id number]  Take 0.5 tablets (12.5 mg) by mouth once daily. FRANCISCO Alaniz-CNP  Active   triamcinolone (Kenalog) 0.1 % cream  [id number]  Apply topically 2 times a day. Apply to affected area 1-2 times daily as needed. Avoid face and groin. FRANCISCO Orellana-CNP  Active   True Metrix Air Glucose Meter kit [id number] No  Historical Provider, MD Taking Active   True Metrix Glucose Test Strip strip [id number] No USE AS DIRECTED Amy Cummnigs DO Taking Active   TRUEplus Lancets 33 gauge misc [id number] No USE AS DIRECTED Amy Cummings,  Taking Active                    DISEASE MANAGEMENT ASSESSMENT:     ANTICOAGULATION ASSESSMENT    The ASCVD Risk score (Lilliana SANDERSON, et al., 2019) failed to calculate for the following reasons:    Risk score cannot be calculated because patient has a medical history suggesting prior/existing ASCVD    DIAGNOSIS: prevention of nonvalvular atrial fibrilliation stroke and systemic embolism  - Patient is projected to be on anticoagulation long-term  - [id number] Score: [6] (only included if diagnosis is atrial fibrillation)   Age: [<65 (0)] [65-74 (+1)] [> 75 (+2)]: 2  Sex: [Male/Female (+1)]: 1  CHF history: [No/Yes(+1)]: 1  Hypertension history: [No/Yes(+1)]: 1  Stroke/TIA/thromboembolism history: [No/Yes(+2)]: 0  Vascular disease history (prior MI, peripheral artery disease, aortic plaque): [No/Yes(+1)]: 0  Diabetes history: [No/Yes(+1)]: 1    CURRENT PHARMACOTHERAPY:    Eliquis 5mg twice daily  76 yo  74.4 kg  Scr 0.75 (03/11/2025)    RELEVANT PAST MEDICAL HISTORY:   Afib, HTN, HLD, DM, CHF    Affordability/Accessibility:  PAP  Adherence/Organization: reports adherence, patient did not set an alarm to help remind her to take her medications  Adverse Reactions: None reported  Recent Hospitalizations: None  Recent Falls/Trauma: None reported  Changes in Tobacco or Alcohol Intake:   Tobacco: none  Alcohol: occasionally    EDUCATION/COUNSELING:   - Counseled patient on MOA, expectations, duration of therapy, contraindications, administration, and monitoring parameters  - Counseled patient of side effects  that are indicative of bleeding such as dark tarry stool, unexplainable bruising, or vomiting up a coffee ground like substance    CHF ASSESSMENT     Symptom/Staging:  -Most recent ejection fraction: 45%  -NYHA Stage: unknown    Results for orders placed during the hospital encounter of 01/16/25    Transthoracic Echo (TTE) Complete    Narrative  [place] [id number]  Phone [phone] Fax [phone]    TRANSTHORACIC ECHOCARDIOGRAM REPORT    Patient Name:       ADRIEL REYNALDO FRANCISCO   Reading Physician:    [id number] Blake Doran DO  Study Date:         1/16/2025           Ordering Provider:    [id number] LIBRADO LOWERY  MRN/PID:            [id number]            Fellow:  Accession#:         [id number]        Nurse:  Date of Birth/Age:  1950 / 74      Sonographer:          María Elena Ochoa RDCS  years  Gender Assigned at  F                   Additional Staff:  Birth:  Height:             157.48 cm           Admit Date:           1/16/2025  Weight:             83.01 kg            Admission Status:     Outpatient  BSA / BMI:          1.84 m2 / 33.47     Department Location:  [facility] Echo  kg/m2                                     Lab  Blood Pressure: 128 /62 mmHg    Study Type:    TRANSTHORACIC ECHO (TTE) COMPLETE  Diagnosis/ICD: Chronic systolic (congestive) heart failure (CHF)-I50.22;  Shortness of breath-R06.02  Indication:    Congestive Heart Failure  CPT Codes:     Echo Complete w Full Doppler-82505    Patient History:  Pertinent History: CAD, HTN, Hyperlipidemia and CHF.    Study Detail: The following Echo studies were performed: 2D, Doppler, M-Mode,  color flow and Strain.      PHYSICIAN INTERPRETATION:  Left Ventricle: Left ventricular ejection fraction is mildly decreased, by visual estimate at 45%. There is global hypokinesis of the left ventricle with minor regional variations. The left ventricular cavity size is normal. There is normal septal and normal posterior  left ventricular wall thickness. Left Ventricular Global Longitudinal Strain - 10.0 %. Spectral Doppler shows an abnormal pattern of left ventricular diastolic filling.  Left Atrium: The left atrium is normal in size.  Right Ventricle: The right ventricle is normal in size. There is normal right ventricular global systolic function.  Right Atrium: The right atrium is normal in size.  Aortic Valve: The aortic valve is trileaflet. The aortic valve dimensionless index is 0.79. There is no evidence of aortic valve regurgitation. The peak instantaneous gradient of the aortic valve is 6 mmHg. The mean gradient of the aortic valve is 4 mmHg.  Mitral Valve: The mitral valve is normal in structure. There is mild mitral annular calcification. There is trace to mild mitral valve regurgitation.  Tricuspid Valve: The tricuspid valve is structurally normal. There is mild tricuspid regurgitation. The Doppler estimated RVSP is mild to moderately elevated at 47.4 mmHg.  Pulmonic Valve: The pulmonic valve is structurally normal. There is trace pulmonic valve regurgitation.  Pericardium: No pericardial effusion noted.  Aorta: The aortic root is normal.      CONCLUSIONS:  1. Left ventricular ejection fraction is mildly decreased, by visual estimate at 45%.  2. There is global hypokinesis of the left ventricle with minor regional variations.  3. Spectral Doppler shows an abnormal pattern of left ventricular diastolic filling.  4. There is normal right ventricular global systolic function.  5. Mild to moderately elevated right ventricular systolic pressure.    QUANTITATIVE DATA SUMMARY:    2D MEASUREMENTS:           Normal Ranges:  IVSd:            0.90 cm   (0.6-1.1cm)  LVPWd:           0.86 cm   (0.6-1.1cm)  LVIDd:           5.36 cm   (3.9-5.9cm)  LVIDs:           3.87 cm  LV Mass Index:   93.5 g/m2  LV % FS          27.9 %      LA VOLUME:                    Normal Ranges:  LA Vol A4C:        56.3 ml    (22+/-6mL/m2)  LA Vol A2C:         44.1 ml  LA Vol BP:         50.3 ml  LA Vol Index A4C:  30.6ml/m2  LA Vol Index A2C:  24.0 ml/m2  LA Vol Index BP:   27.3 ml/m2  LA Area A4C:       19.6 cm2  LA Area A2C:       17.5 cm2  LA Major Axis A4C: 5.8 cm  LA Major Axis A2C: 5.9 cm  LA Volume Index:   27.3 ml/m2  LA Vol A4C:        53.2 ml  LA Vol A2C:        42.4 ml  LA Vol Index BSA:  26.0 ml/m2      RA VOLUME BY A/L METHOD:          Normal Ranges:  RA Area A4C:             12.5 cm2      AORTA MEASUREMENTS:         Normal Ranges:  Ao Sinus, d:        3.60 cm (2.1-3.5cm)  Ao STJ, d:          2.80 cm (1.7-3.4cm)  Asc Ao, d:          2.90 cm (2.1-3.4cm)      LV SYSTOLIC FUNCTION BY 2D PLANIMETRY (MOD):  Normal Ranges:  EF-A4C View:                      21 % (>=55%)  EF-A2C View:                      43 %  EF-Biplane:                       33 %  EF-Visual:                        45 %  EF-Auto:                          39 %  LV EF Reported:                   45 %  Global Longitudinal Strain (GLS): 10 %      LV DIASTOLIC FUNCTION:           Normal Ranges:  MV Peak E:             1.00 m/s  (0.7-1.2 m/s)  MV e'                  0.051 m/s (>8.0)  MV lateral e'          0.06 m/s  MV medial e'           0.04 m/s  E/e' Ratio:            19.51     (<8.0)      AORTIC VALVE:                     Normal Ranges:  AoV Vmax:                1.22 m/s (<=1.7m/s)  AoV Peak P.9 mmHg (<20mmHg)  AoV Mean PG:             3.5 mmHg (1.7-11.5mmHg)  LVOT Max Derik:            1.05 m/s (<=1.1m/s)  AoV VTI:                 30.66 cm (18-25cm)  LVOT VTI:                24.14 cm  LVOT Diameter:           2.00 cm  (1.8-2.4cm)  AoV Area, VTI:           2.48 cm2 (2.5-5.5cm2)  AoV Area,Vmax:           2.73 cm2 (2.5-4.5cm2)  AoV Dimensionless Index: 0.79      RIGHT VENTRICLE:  RV Basal 3.17 cm  RV Mid   2.50 cm  RV Major 7.2 cm  TAPSE:   19.8 mm  RV s'    0.09 m/s      TRICUSPID VALVE/RVSP:          Normal Ranges:  Peak TR Velocity:     3.33 m/s  RV Syst Pressure:     47 mmHg  (<  30mmHg)  IVC Diam:             1.42 cm  TV e'                 0.1 m/s      PULMONIC VALVE:         Normal Ranges:  PV Accel Time:  97 msec (>120ms)      AORTA:  Asc Ao Diam 3.59 cm      [id number] Blake Doran   Electronically signed on 1/16/2025 at 11:59:58 AM        ** Final **      Guideline-Directed Medical Therapy:  -ARNI: Yes, describe: Entresto 24/26mg twice daily  -Beta Blocker: Yes, describe: carvedilol 6.25mg twice daily - not taking as prescribed states PCP told her to stop medication, no documentation noted   -MRA: Yes, describe: spironolactone 12.5mg daily- not taking as prescribed due to unknown reason  -SGLT2i: Yes, describe: Jardiance 25mg daily    Secondary Prevention:  -The ASCVD Risk score (Lilliana SANDERSON, et al., 2019) failed to calculate for the following reasons:    Risk score cannot be calculated because patient has a medical history suggesting prior/existing ASCVD   -Aspirin 81mg? yes  -Statin?: Yes, describe: atorvastatin 80mg daily  -HTN?: Yes, describe: controlled    CURRENT PHARMACOTHERAPY:   Entresto 24/26mg twice daily  Carvedilol 6.25mg twice daily- not taking as prescribed states PCP told her to stop medication, no documentation noted   Spironolactone 12.5mg daily - not taking as prescribed due to unknown reason  Jardiance 25mg daily  Furosemide 20mg daily    Affordability: [facility]  Adherence/Compliance: non-adherence with spironolactone and carvedilol  Adverse Effects: none reported    Monitoring Weights at Home: Yes  Home Weight Recordings: scale broken, cannot measure currently    Past In Office Weight Readings:   Wt Readings from Last 6 Encounters:   03/11/25 74.4 kg (164 lb)   12/30/24 83.9 kg (185 lb)   12/02/24 83.5 kg (184 lb)   10/23/24 86.2 kg (190 lb)   10/09/24 89.4 kg (197 lb)   08/30/24 88.9 kg (196 lb)     Monitoring Blood Pressure at Home: No- patient did not get BP machine as instructed during previous visits.     Past In Office BP Readings:   BP Readings from Last 6 Encounters:    03/11/25 144/73   12/30/24 128/62   12/02/24 124/72   10/23/24 126/62   10/09/24 120/60   08/30/24 138/64     HEALTH MANAGEMENT    Maintaining fluid restriction (<2 L/day): N/A  Edema/swelling: No  Shortness of breath: No  Trouble sleeping/lying down: No  Dry/hacking cough: Yes; pt reports chronic dry cough  Recent Hospitalizations: No    EDUCATION/COUNSELING:   - Counseled patient on MOA, expectations, duration of therapy, contraindications, administration, and monitoring parameters  - Counseled patient on lifestyle modifications that can decrease your risk of having complications (smoking cessation, losing weight, daily weights, vaccines)  - Counseled patient on fluid intake and weight management. Recommended to not consume more than 2 liters of fliuids per day. If they have gained more than 2-3 pounds within a 24 hours period (or 5 pounds in a week), contact their cardiologist  - Answered all patient questions and concerns     DISCUSSION/NOTES:   Today was a follow up visit with Victorina. There are concerns for adherence with medication, specifically with carvedilol and spironolactone- see documentation above. Patient has been counseled previously to set an alarm to remind her to take medication. I recommend patient also keep an updated list of medications to bring with her to all appointments.  Patient states she is doing well on anticoagulation therapy. Patient reports adherence, no adverse effects, and denies s/s of bleeding.   Patient confirms she is taking Entresto 24/26mg twice daily and Jardiance 25mg daily. She is tolerating medication reporting no adverse effects and denies s/s of worsening heart failure. Patient has not gotten a BP machine and has not fixed her scale as discussed previously at past appts.    ASSESSMENT:    Assessment/Plan   Problem List Items Addressed This Visit       Atrial fibrillation (Multi) (Chronic)    Patient age, weight and renal function appropriate to continue Eliquis 5mg  twice daily.         Relevant Orders    Referral to Clinical Pharmacy    Heart failure with improved ejection fraction (HFimpEF)    Patient prescribed 4 of 4 GDMT. Patient is non-adherent, she is not taking carvedilol and spironolactone due to unknown reason. I recommend patient restart carvedilol 6.25mg twice daily and spironolactone 12.5mg daily as prescribed. Unable to assess home BP. Renal function and potassium level appropriate to continue current regimen as prescribed.     Labs (03/11/2025): Scr-0.75 eGFR-85 K-3.1         Relevant Orders    Referral to Clinical Pharmacy       RECOMMENDATIONS/PLAN:  CONTINUE:   Entresto 24/26mg twice daily  Carvedilol 6.25mg twice daily  Spironolactone 12.5mg daily  Jardiance 25mg daily  Furosemide 20mg daily  Eliquis 5mg twice daily    Last Appnt with Referring Provider: 12/30/24  Next Appnt with Referring Provider: needs scheduled  Clinical Pharmacist follow up: 1 month  VAF/Application Expiration: Yes    Date: 1/10/2026  Type of Encounter: Toro Colindres PharmD    Verbal consent to manage patient's drug therapy was obtained from the patient . They were informed they may decline to participate or withdraw from participation in pharmacy services at any time.    Continue all meds under the continuation of care with the referring provider and clinical pharmacy team.

## 2025-05-01 NOTE — ASSESSMENT & PLAN NOTE
Patient prescribed 4 of 4 GDMT. Patient is non-adherent, she is not taking carvedilol and spironolactone due to unknown reason. I recommend patient restart carvedilol 6.25mg twice daily and spironolactone 12.5mg daily as prescribed. Unable to assess home BP. Renal function and potassium level appropriate to continue current regimen as prescribed.     Labs (03/11/2025): Scr-0.75 eGFR-85 K-3.1

## 2025-05-02 RX ORDER — PANTOPRAZOLE SODIUM 40 MG/1
40 TABLET, DELAYED RELEASE ORAL DAILY
Qty: 90 TABLET | Refills: 3 | Status: SHIPPED | OUTPATIENT
Start: 2025-05-02

## 2025-05-13 ENCOUNTER — APPOINTMENT (OUTPATIENT)
Dept: PRIMARY CARE | Facility: CLINIC | Age: 75
End: 2025-05-13
Payer: MEDICARE

## 2025-05-21 ENCOUNTER — APPOINTMENT (OUTPATIENT)
Dept: PRIMARY CARE | Facility: CLINIC | Age: 75
End: 2025-05-21
Payer: MEDICARE

## 2025-05-30 ENCOUNTER — TELEPHONE (OUTPATIENT)
Dept: PRIMARY CARE | Facility: CLINIC | Age: 75
End: 2025-05-30
Payer: MEDICARE

## 2025-06-02 ENCOUNTER — TELEPHONE (OUTPATIENT)
Dept: PRIMARY CARE | Facility: CLINIC | Age: 75
End: 2025-06-02
Payer: MEDICARE

## 2025-06-02 DIAGNOSIS — F41.9 ANXIETY: ICD-10-CM

## 2025-06-02 RX ORDER — SERTRALINE HYDROCHLORIDE 100 MG/1
100 TABLET, FILM COATED ORAL DAILY
Qty: 90 TABLET | Refills: 3 | Status: SHIPPED | OUTPATIENT
Start: 2025-06-02 | End: 2026-05-28

## 2025-06-02 NOTE — TELEPHONE ENCOUNTER
Med refill   sertraline (Zoloft) 100 mg tablet [[id number]]       [id number] - [place]), [place] - [address]  [address] , [place] [address]  Phone: [phone]  Fax: [phone]  FERNANDO #: [id number]

## 2025-06-12 ENCOUNTER — APPOINTMENT (OUTPATIENT)
Dept: PHARMACY | Facility: HOSPITAL | Age: 75
End: 2025-06-12
Payer: MEDICARE

## 2025-06-13 DIAGNOSIS — I10 ESSENTIAL HYPERTENSION: ICD-10-CM

## 2025-06-13 DIAGNOSIS — I42.8 OTHER CARDIOMYOPATHY: ICD-10-CM

## 2025-06-13 DIAGNOSIS — I50.22 CHRONIC SYSTOLIC HEART FAILURE: ICD-10-CM

## 2025-06-13 RX ORDER — CARVEDILOL 6.25 MG/1
6.25 TABLET ORAL
Qty: 180 TABLET | Refills: 0 | Status: SHIPPED | OUTPATIENT
Start: 2025-06-13

## 2025-06-13 RX ORDER — SPIRONOLACTONE 25 MG/1
12.5 TABLET ORAL DAILY
Qty: 45 TABLET | Refills: 0 | Status: SHIPPED | OUTPATIENT
Start: 2025-06-13 | End: 2025-09-11

## 2025-06-17 ENCOUNTER — TELEPHONE (OUTPATIENT)
Dept: CARDIOLOGY | Facility: HOSPITAL | Age: 75
End: 2025-06-17
Payer: MEDICARE

## 2025-06-17 NOTE — TELEPHONE ENCOUNTER
Called patient at this time to [facility] but the [facility] was full.     Follow up appt for medication refills.   Scheduled for  Mon Jul 14 at 2:00 PM .    Thank you!  Kirit CORDERO

## 2025-07-07 RX ORDER — HYDROCODONE BITARTRATE AND ACETAMINOPHEN 5; 325 MG/1; MG/1
2 TABLET ORAL EVERY 6 HOURS PRN
COMMUNITY
Start: 2024-11-19

## 2025-07-11 ENCOUNTER — TELEPHONE (OUTPATIENT)
Dept: CARDIOLOGY | Facility: HOSPITAL | Age: 75
End: 2025-07-11
Payer: MEDICARE

## 2025-07-11 NOTE — TELEPHONE ENCOUNTER
Patient called in to rescheduled for an earlier time and I got her rescheduled for Fri Jul 18 at 10:15 AM .    Patient expressed understanding and has no questions at this time.    Thank you!  Kirit CORDERO

## 2025-07-14 ENCOUNTER — APPOINTMENT (OUTPATIENT)
Dept: CARDIOLOGY | Facility: HOSPITAL | Age: 75
End: 2025-07-14
Payer: MEDICARE

## 2025-07-15 DIAGNOSIS — N18.31 TYPE 2 DIABETES MELLITUS WITH STAGE 3A CHRONIC KIDNEY DISEASE, WITHOUT LONG-TERM CURRENT USE OF INSULIN (MULTI): ICD-10-CM

## 2025-07-15 DIAGNOSIS — E11.22 TYPE 2 DIABETES MELLITUS WITH STAGE 3A CHRONIC KIDNEY DISEASE, WITHOUT LONG-TERM CURRENT USE OF INSULIN (MULTI): ICD-10-CM

## 2025-07-15 DIAGNOSIS — R42 VERTIGO: ICD-10-CM

## 2025-07-15 NOTE — TELEPHONE ENCOUNTER
Patient called to request medication refill.    Last appointment with our providers: 03/11/2025    Next appointment with our providers: 08/14/2025    Name of Medication: metFORMIN (Glucophage) 1,000 mg tablet     Pharmacy:  [id number] - [place] - [address] [facility] [address]  [address] [facility] [place] [address]  Phone: [phone]  Fax: [phone]

## 2025-07-16 RX ORDER — METFORMIN HYDROCHLORIDE 1000 MG/1
1000 TABLET ORAL 2 TIMES DAILY
Qty: 180 TABLET | Refills: 3 | Status: SHIPPED | OUTPATIENT
Start: 2025-07-16

## 2025-07-16 RX ORDER — MECLIZINE HCL 12.5 MG 12.5 MG/1
25 TABLET ORAL 3 TIMES DAILY PRN
Qty: 90 TABLET | Refills: 0 | Status: SHIPPED | OUTPATIENT
Start: 2025-07-16

## 2025-07-17 NOTE — PROGRESS NOTES
"Counseling:  The patient was counseled regarding diagnostic results, instructions for management, risk factor reductions, prognosis, patient and family education, impressions, risks and benefits of treatment options and importance of compliance with treatment.       Chief Complaint:   The patient presents today for 6-month followup of CAD, a-fib, heart failure, and dyslipidemia.      History Of Present Illness:    Victorina Adamson is a 75 year old female patient who presents today for 6-month followup of CAD, a-fib, heart failure, and dyslipidemia. Her PMH is significant for arthritis, CAD s/p CABG x4 (LIMA-LAD, SVG-OM1 distally and SVG sequentially-RCA and PDA) 04/21/2022, atrial fibrillation s/p DCC 05/17/2022, CHF, depression, DM, HTN, hyperlipidemia, hypothyroidism, SHARON and h/o shingles. Over the past 6 months, the patient states that she has done well from a cardiac standpoint. She denies any CP, chest discomfort or SOB. BP has been stable. EKG today shows that the patient is in NSR. The patient is compliant with her prescribed medications.           Last Recorded Vitals:  Vitals:    07/18/25 1017   BP: 130/70   BP Location: Left arm   Patient Position: Sitting   BP Cuff Size: Adult   Pulse: 62   SpO2: 99%   Weight: 72.6 kg (160 lb)   Height: 1.575 m (5' 2\")       Past Surgical History:  She has a past surgical history that includes Other surgical history (06/17/2020); Other surgical history (06/17/2020); Other surgical history (06/17/2020); Other surgical history (05/19/2022); Other surgical history (05/26/2022); Other surgical history (06/01/2022); Other surgical history (03/15/2022); Other surgical history (02/11/2021); Other surgical history (10/18/2022); Other surgical history (09/20/2021); and IR injection joint (10/13/2021).      Social History:  She reports that she has never smoked. She has never used smokeless tobacco. She reports current alcohol use. She reports that she does not use drugs.    Family " History:  Family History[1]     Allergies:  Gabapentin, Hydrocodone-acetaminophen, Influenza virus vaccines, Lisinopril, and Oxycodone    Outpatient Medications:  Current Outpatient Medications   Medication Instructions    acetaminophen (TYLENOL) 1,000 mg, Every 6 hours PRN    albuterol 90 mcg/actuation inhaler 2 puffs, inhalation, Every 4 hours PRN    ammonium lactate (Lac-Hydrin) 12 % lotion Topical, As needed    aspirin 81 mg, Daily    atorvastatin (LIPITOR) 80 mg, oral, Nightly    blood glucose control, low (True Metrix Level 1) solution 1 each, Topical, See admin instructions    carvedilol (COREG) 6.25 mg, oral, 2 times daily (morning and late afternoon)    DropSafe Alcohol Prep Pads pads, medicated USE AS DIRECTED    Eliquis 5 mg, oral, 2 times daily    ezetimibe (ZETIA) 10 mg, oral, Daily    furosemide (LASIX) 20 mg, oral, Daily    glimepiride (AMARYL) 4 mg, oral, Daily before breakfast    HYDROcodone-acetaminophen (Norco) 5-325 mg tablet 2 tablets, Every 6 hours PRN    Jardiance 25 mg, oral, Daily    levothyroxine (SYNTHROID, LEVOXYL) 50 mcg, oral, Daily    losartan (COZAAR) 25 mg, oral, Daily    meclizine (ANTIVERT) 25 mg, oral, 3 times daily PRN    metFORMIN (GLUCOPHAGE) 1,000 mg, oral, 2 times daily    pantoprazole (PROTONIX) 40 mg, oral, Daily    pregabalin (LYRICA) 50 mg, oral, 2 times daily    sacubitriL-valsartan (Entresto) 24-26 mg tablet 1 tablet, oral, 2 times daily    sertraline (ZOLOFT) 100 mg, oral, Daily    spironolactone (ALDACTONE) 12.5 mg, oral, Daily    triamcinolone (Kenalog) 0.1 % cream Topical, 2 times daily, Apply to affected area 1-2 times daily as needed. Avoid face and groin.    True Metrix Air Glucose Meter kit     True Metrix Glucose Test Strip strip USE AS DIRECTED    TRUEplus Lancets 33 gauge misc USE AS DIRECTED     Review of Systems   All other systems reviewed and are negative.     Physical Exam:  Constitutional:       Appearance: Not in distress.      Comments: pale   Neck:       Vascular: No JVR. JVD normal.   Pulmonary:      Effort: Pulmonary effort is normal.      Breath sounds: Normal breath sounds. No wheezing. No rhonchi. No rales.   Chest:      Chest wall: Not tender to palpatation.   Cardiovascular:      PMI at left midclavicular line. Normal rate. Regular rhythm. Normal S1. Normal S2.       Murmurs: There is no murmur.      No gallop.  No click. No rub.   Pulses:     Intact distal pulses.   Edema:     Peripheral edema absent.   Abdominal:      General: Bowel sounds are normal.      Palpations: Abdomen is soft.      Tenderness: There is no abdominal tenderness.   Musculoskeletal: Normal range of motion.         General: No tenderness. Skin:     General: Skin is warm and dry.   Neurological:      General: No focal deficit present.      Mental Status: Alert and oriented to person, place and time.        Last Labs:  CBC -  Lab Results   Component Value Date    WBC 10.1 03/11/2025    HGB 9.4 (L) 03/11/2025    HCT 34.2 (L) 03/11/2025    MCV 71.3 (L) 03/11/2025     (H) 03/11/2025       CMP -  Lab Results   Component Value Date    CALCIUM 7.4 (L) 03/11/2025    PHOS 4.8 05/03/2022    PROT 6.5 03/11/2025    ALBUMIN 3.6 03/11/2025    AST 16 03/11/2025    ALT 6 03/11/2025    ALKPHOS 68 03/11/2025    BILITOT 0.4 03/11/2025       LIPID PANEL -   Lab Results   Component Value Date    CHOL 79 09/16/2024    TRIG 126 09/16/2024    HDL 38.8 09/16/2024    CHHDL 2.0 09/16/2024    LDLF 99 05/10/2023    VLDL 25 09/16/2024    NHDL 40 09/16/2024       RENAL FUNCTION PANEL -   Lab Results   Component Value Date    GLUCOSE 133 (H) 03/11/2025     03/11/2025    K 3.1 (L) 03/11/2025     03/11/2025    CO2 27 03/11/2025    ANIONGAP 13 03/11/2025    BUN 11 03/11/2025    CREATININE 0.75 03/11/2025    GFRMALE CANCELED 02/24/2023    CALCIUM 7.4 (L) 03/11/2025    PHOS 4.8 05/03/2022    ALBUMIN 3.6 03/11/2025        Lab Results   Component Value Date     (H) 10/23/2024    HGBA1C 6.7 (A)  03/11/2025       Last Cardiology Tests:  01/16/2025 - TTE  1. Left ventricular ejection fraction is mildly decreased, by visual estimate at 45%.  2. There is global hypokinesis of the left ventricle with minor regional variations.  3. Spectral Doppler shows an abnormal pattern of left ventricular diastolic filling.  4. There is normal right ventricular global systolic function.  5. Mild to moderately elevated right ventricular systolic pressure.     12/26/2023 - TTE  1. Left ventricular systolic function is low normal with a 50-55% estimated ejection fraction.  2. Spectral Doppler shows a pseudonormal pattern of left ventricular diastolic filling.  3. There are elevated left atrial and left ventricular end diastolic pressures.  4. There is low normal right ventricular systolic function.  5. Mildly elevated RVSP.     01/18/2023 - TTE  1. Left ventricular systolic function is moderately decreased with a 35% estimated ejection fraction.  2. Spectral Doppler shows an abnormal pattern of left ventricular diastolic filling.  3. There is moderately reduced right ventricular systolic function.  4. Mild to moderate mitral valve regurgitation.  5. Mildly elevated RVSP.  6. Moderate tricuspid regurgitation.  7. There is global hypokinesis of the left ventricle with minor regional variations.     12/01/2022 - Cardiac Catheterization (LH/RH)  1. Triple vessel disease.  2. Patent LIMA to LAD and SVG to Cx OM.  3. Occluded SVG to RCA with non-hemodynamically sig disease of RCA.  4. Elevated LV filling pressures.  5. Left Ventricular end-diastolic pressure = 24.     11/28/2022 - TTE  1. Left ventricular systolic function is moderately decreased with a 35% estimated ejection fraction.  2. Spectral Doppler shows a restrictive pattern of left ventricular diastolic filling.  3. There is severely reduced right ventricular systolic function.  4. Mild to moderate tricuspid regurgitation.  5. Mildly elevated RVSP.  6. There is global  hypokinesis of the left ventricle with minor regional variations.     11/26/2022 - CXR  Enlarged cardiac silhouette. Streaky right lower chest infiltrate or atelectasis. Possible small left pleural effusion.     08/22/2022 - TTE  Left ventricular systolic function is normal with a 60-65% estimated ejection fraction.     05/17/2022 - FREDDIE with DCC  1. The left ventricular systolic function is mildly to moderately decreased with a 45-50% estimated ejection fraction.  2. Successful direct current cardioversion for atrial fibrillation resulting in normal sinus rhythm.  3. The left atrium is mild to moderately dilated.  4. There is global hypokinesis of the left ventricle with minor regional variations.     04/25/2022 - TTE  1. The left ventricular systolic function is mildly to moderately decreased with a 40-45% estimated ejection fraction.  2. Abnormal septal motion consistent with post-operative status.  3. Spectral Doppler shows an impaired relaxation pattern of left ventricular diastolic filling.  4. Slightly elevated RVSP. The Doppler estimated RVSP is slightly elevated at 30.2 mmHg.  5. There is global hypokinesis of the left ventricle with minor regional variations.     04/21/2022 - Operative Report (Dr. JW Diop)  1. Coronary artery bypass grafting x4 with left internal mammary artery to left anterior descending artery, saphenous vein graft to obtuse marginal 1 distally and saphenous vein graft sequentially to right coronary artery and posterior descending artery.   2. Endoscopic vein harvesting.   3. Ligation of left atrial appendage with a 40 mm AtriCure clip, lot number [id number].   4. Posterior pericardiotomy.   5. Medistim flow probe analysis of bypass grafts.     04/06/2022 - Vascular Lab Carotid Artery Duplex U/S   1. Right Carotid: Findings are consistent with less than 50% stenosis of the right proximal ICA. Laminar flow seen by color Doppler. Right external carotid artery appears patent with no  evidence of stenosis. No evidence of hemodynamically significant stenosis of the right common carotid artery. The right vertebral artery is patent with antegrade flow. No evidence of hemodynamically significant stenosis in the right subclavian.  2. Left Carotid: Findings are consistent with less than 50% stenosis of the left proximal ICA. Laminar flow seen by color Doppler. Left external carotid artery appears patent with no evidence of stenosis. No evidence of hemodynamically significant stenosis of the left common carotid artery. The left vertebral artery is patent with antegrade flow. No evidence of hemodynamically significant stenosis in the left subclavian.     03/04/2022 - Cardiac MRI  1. Normal LV size (EDVi 48 ml/m2) with moderate-severely reduced systolic function (LVEF 30%).  2. Global hypokinesis with severe hypokinesis of the basal lateral wall.  3. Subendocardial infarction of the basal lateral wall (<50% wall thickness). No evidence of myocardial infiltration.  4. All myocardial segments are deemed viable.     02/21/2022 - Cardiac Catheterization (LH/RH)  1. Triple vessel coronary artery disease with proximal left anterior descending involvement.  2. The 1st diagonal branch showed severe atherosclerotic disease and diffuse atherosclerotic disease.  3. The 2nd diagonal branch demonstrated severe atherosclerotic disease and diffuse atherosclerotic disease.  4. The 1st obtuse marginal branch showed diffuse atherosclerotic disease.  5. The 2nd obtuse marginal branch demonstrated severe atherosclerotic disease.  6. The right posterior descending artery showed severe atherosclerotic disease.  7. Elevated LV filling pressures.  8. Left Ventricular end-diastolic pressure = 18.     02/19/2022 - TTE  1. The left ventricular systolic function is severely decreased with a 25-30% estimated ejection fraction.  2. Spectral Doppler shows a pseudonormal pattern of left ventricular diastolic filling.  3. There is global  hypokinesis of the left ventricle with minor regional variations.     Lab review: I have personally reviewed the laboratory result(s) (03/11/2025 and 03/26/2025).  Diagnostic review: I have personally reviewed the result(s) of the EKG (07/18/2025).     Assessment/Plan   1) CAD s/p CABG  On carvedilol 6.25 mg BID, atorvastatin 80 mg daily, Zetia 10 mg daily, furosemide 20 mg daily, losartan 25 mg daily.  Cath with occluded SVG to RCA but other grafts are patent  TTE 01/16/2025 with LVEF 45%, abnormal pattern of LV diastolic filling, normal RV systolic function, mild to moderately elevated RVSP.  Denies CP, chest discomfort or SOB  BP stable  CMP 03/11/2025 with low potasium of 3.1  Check CBC, CMP  Continue current medical Rx   Followup with Tigist Calle NP, in 6 months     2) Postop A-Fib  On apixaban 5 mg BID, carvedilol 6.25 mg BID   [id number] score is 6  Discussed switching apixaban to warfarin s/t cost - patient declined   In NSR  Continue current medical Rx   Followup with Tigist Calle NP, in 6 months     3) Chronic Systolic Heart Failure  On carvedilol 6.25 mg BID, Entresto 24-26 mg BID, Jardiance 25 mg daily, furosemide 20 mg daily, losartan 25 mg daily.  Entresto previously discontinued s/t cost   Lisinopril previously discontinued s/t cough  Established with [facility]  TTE 01/16/2025 with LVEF 45%, normal RV systolic function, mild to moderately elevated RVSP.  Stable and asymptomatic   BP stable   Check CBC, CMP  Continue current medical Rx   Followup with Tigist Calle NP, in 6 months     4) Dyslipidemia  Goal LDL <70  On atorvastatin 80 mg daily, ezetimibe 10 mg daily   Will avoid PCSK9 inhibitors at this time as patient is having difficulties affording medication   Lipid panel 09/16/2024 with LDL of 15; at goal  Check Lipid Panel  Continue current medical Rx   Followup with Tigist Calle NP, in 6 months    5) Anemia   Looks pale in examination today   CBC 03/11/2025 with  hemoglobin of 9.4  Check haptoglobin, B12, folate, Iron/TIBC, CBC, A1c  Referral placed to CARMELO Toussaint Attestation  By signing my name below, I, Jeannine Velazquez, attest that this documentation has been prepared under the direction and in the presence of Devyn Brooks MD.        [1]   Family History  Problem Relation Name Age of Onset    Hypertension Mother          benign essential    Arthritis Mother      Coronary artery disease Mother      Depression Mother      Diabetes Mother      Other (substance abuse) Mother      Lung cancer Father      Other (substance abuse) Father      Diabetes Sister      Hypertension Brother          benign essential    Lung cancer Brother

## 2025-07-18 ENCOUNTER — OFFICE VISIT (OUTPATIENT)
Dept: CARDIOLOGY | Facility: HOSPITAL | Age: 75
End: 2025-07-18
Payer: MEDICARE

## 2025-07-18 VITALS
WEIGHT: 160 LBS | HEIGHT: 62 IN | SYSTOLIC BLOOD PRESSURE: 130 MMHG | BODY MASS INDEX: 29.44 KG/M2 | DIASTOLIC BLOOD PRESSURE: 70 MMHG | HEART RATE: 62 BPM | OXYGEN SATURATION: 99 %

## 2025-07-18 DIAGNOSIS — D50.0 IRON DEFICIENCY ANEMIA DUE TO CHRONIC BLOOD LOSS: ICD-10-CM

## 2025-07-18 DIAGNOSIS — E78.2 MIXED HYPERLIPIDEMIA: ICD-10-CM

## 2025-07-18 DIAGNOSIS — R73.9 HYPERGLYCEMIA: ICD-10-CM

## 2025-07-18 DIAGNOSIS — I48.0 PAROXYSMAL ATRIAL FIBRILLATION (MULTI): Primary | Chronic | ICD-10-CM

## 2025-07-18 DIAGNOSIS — I25.10 CORONARY ARTERY DISEASE INVOLVING NATIVE CORONARY ARTERY OF NATIVE HEART WITHOUT ANGINA PECTORIS: ICD-10-CM

## 2025-07-18 DIAGNOSIS — Z95.1 S/P CABG X 4: ICD-10-CM

## 2025-07-18 DIAGNOSIS — I10 PRIMARY HYPERTENSION: ICD-10-CM

## 2025-07-18 LAB
ATRIAL RATE: 62 BPM
P AXIS: 83 DEGREES
P OFFSET: 179 MS
P ONSET: 134 MS
PR INTERVAL: 172 MS
Q ONSET: 220 MS
QRS COUNT: 10 BEATS
QRS DURATION: 84 MS
QT INTERVAL: 462 MS
QTC CALCULATION(BAZETT): 468 MS
QTC FREDERICIA: 467 MS
R AXIS: 80 DEGREES
T AXIS: 72 DEGREES
T OFFSET: 451 MS
VENTRICULAR RATE: 62 BPM

## 2025-07-18 PROCEDURE — 99214 OFFICE O/P EST MOD 30 MIN: CPT | Performed by: INTERNAL MEDICINE

## 2025-07-18 PROCEDURE — 1036F TOBACCO NON-USER: CPT | Performed by: INTERNAL MEDICINE

## 2025-07-18 PROCEDURE — 3078F DIAST BP <80 MM HG: CPT | Performed by: INTERNAL MEDICINE

## 2025-07-18 PROCEDURE — 93005 ELECTROCARDIOGRAM TRACING: CPT | Performed by: INTERNAL MEDICINE

## 2025-07-18 PROCEDURE — 99213 OFFICE O/P EST LOW 20 MIN: CPT | Mod: 25

## 2025-07-18 PROCEDURE — 93010 ELECTROCARDIOGRAM REPORT: CPT | Performed by: INTERNAL MEDICINE

## 2025-07-18 PROCEDURE — 1160F RVW MEDS BY RX/DR IN RCRD: CPT | Performed by: INTERNAL MEDICINE

## 2025-07-18 PROCEDURE — 1159F MED LIST DOCD IN RCRD: CPT | Performed by: INTERNAL MEDICINE

## 2025-07-18 PROCEDURE — 3075F SYST BP GE 130 - 139MM HG: CPT | Performed by: INTERNAL MEDICINE

## 2025-07-18 NOTE — PATIENT INSTRUCTIONS
Continue all current medications as prescribed.  Please have blood work drawn.  Dr. Brooks has placed a referral to Dr. Trujillo, gastroenterologist, for further evaluation of anemia.  Followup with Tigist Calle NP, in 6 months.      If you have any questions or cardiac concerns, please call our office at [phone].

## 2025-07-19 LAB
ALBUMIN SERPL-MCNC: 3.9 G/DL (ref 3.6–5.1)
ALP SERPL-CCNC: 68 U/L (ref 37–153)
ALT SERPL-CCNC: 5 U/L (ref 6–29)
ANION GAP SERPL CALCULATED.4IONS-SCNC: 10 MMOL/L (CALC) (ref 7–17)
AST SERPL-CCNC: 13 U/L (ref 10–35)
BASOPHILS # BLD AUTO: 34 CELLS/UL (ref 0–200)
BASOPHILS NFR BLD AUTO: 0.4 %
BILIRUB SERPL-MCNC: 0.5 MG/DL (ref 0.2–1.2)
BUN SERPL-MCNC: 11 MG/DL (ref 7–25)
CALCIUM SERPL-MCNC: 8.6 MG/DL (ref 8.6–10.4)
CHLORIDE SERPL-SCNC: 109 MMOL/L (ref 98–110)
CHOLEST SERPL-MCNC: 105 MG/DL
CHOLEST/HDLC SERPL: 1.9 (CALC)
CO2 SERPL-SCNC: 24 MMOL/L (ref 20–32)
CREAT SERPL-MCNC: 0.8 MG/DL (ref 0.6–1)
EGFRCR SERPLBLD CKD-EPI 2021: 76 ML/MIN/1.73M2
EOSINOPHIL # BLD AUTO: 485 CELLS/UL (ref 15–500)
EOSINOPHIL NFR BLD AUTO: 5.7 %
ERYTHROCYTE [DISTWIDTH] IN BLOOD BY AUTOMATED COUNT: 17.7 % (ref 11–15)
EST. AVERAGE GLUCOSE BLD GHB EST-MCNC: 134 MG/DL
EST. AVERAGE GLUCOSE BLD GHB EST-SCNC: 7.4 MMOL/L
FOLATE SERPL-MCNC: 9.5 NG/ML
GLUCOSE SERPL-MCNC: 105 MG/DL (ref 65–99)
HAPTOGLOB SERPL-MCNC: NORMAL MG/DL
HBA1C MFR BLD: 6.3 %
HCT VFR BLD AUTO: 32.3 % (ref 35–45)
HDLC SERPL-MCNC: 54 MG/DL
HGB BLD-MCNC: 8.5 G/DL (ref 11.7–15.5)
IRON SATN MFR SERPL: 6 % (CALC) (ref 16–45)
IRON SERPL-MCNC: 20 MCG/DL (ref 45–160)
LDLC SERPL CALC-MCNC: 34 MG/DL (CALC)
LYMPHOCYTES # BLD AUTO: 1683 CELLS/UL (ref 850–3900)
LYMPHOCYTES NFR BLD AUTO: 19.8 %
MCH RBC QN AUTO: 19.9 PG (ref 27–33)
MCHC RBC AUTO-ENTMCNC: 26.3 G/DL (ref 32–36)
MCV RBC AUTO: 75.6 FL (ref 80–100)
MONOCYTES # BLD AUTO: 536 CELLS/UL (ref 200–950)
MONOCYTES NFR BLD AUTO: 6.3 %
NEUTROPHILS # BLD AUTO: 5763 CELLS/UL (ref 1500–7800)
NEUTROPHILS NFR BLD AUTO: 67.8 %
NONHDLC SERPL-MCNC: 51 MG/DL (CALC)
PLATELET # BLD AUTO: 312 THOUSAND/UL (ref 140–400)
PMV BLD REES-ECKER: 10.7 FL (ref 7.5–12.5)
POTASSIUM SERPL-SCNC: 3.6 MMOL/L (ref 3.5–5.3)
PROT SERPL-MCNC: 6.7 G/DL (ref 6.1–8.1)
RBC # BLD AUTO: 4.27 MILLION/UL (ref 3.8–5.1)
SODIUM SERPL-SCNC: 143 MMOL/L (ref 135–146)
TIBC SERPL-MCNC: 357 MCG/DL (CALC) (ref 250–450)
TRIGL SERPL-MCNC: 85 MG/DL
TSH SERPL-ACNC: 3.65 MIU/L (ref 0.4–4.5)
VIT B12 SERPL-MCNC: 275 PG/ML (ref 200–1100)
WBC # BLD AUTO: 8.5 THOUSAND/UL (ref 3.8–10.8)

## 2025-07-21 ENCOUNTER — TELEPHONE (OUTPATIENT)
Facility: CLINIC | Age: 75
End: 2025-07-21
Payer: MEDICARE

## 2025-07-21 LAB
ALBUMIN SERPL-MCNC: 3.9 G/DL (ref 3.6–5.1)
ALP SERPL-CCNC: 68 U/L (ref 37–153)
ALT SERPL-CCNC: 5 U/L (ref 6–29)
ANION GAP SERPL CALCULATED.4IONS-SCNC: 10 MMOL/L (CALC) (ref 7–17)
AST SERPL-CCNC: 13 U/L (ref 10–35)
BASOPHILS # BLD AUTO: 34 CELLS/UL (ref 0–200)
BASOPHILS NFR BLD AUTO: 0.4 %
BILIRUB SERPL-MCNC: 0.5 MG/DL (ref 0.2–1.2)
BUN SERPL-MCNC: 11 MG/DL (ref 7–25)
CALCIUM SERPL-MCNC: 8.6 MG/DL (ref 8.6–10.4)
CHLORIDE SERPL-SCNC: 109 MMOL/L (ref 98–110)
CHOLEST SERPL-MCNC: 105 MG/DL
CHOLEST/HDLC SERPL: 1.9 (CALC)
CO2 SERPL-SCNC: 24 MMOL/L (ref 20–32)
CREAT SERPL-MCNC: 0.8 MG/DL (ref 0.6–1)
EGFRCR SERPLBLD CKD-EPI 2021: 76 ML/MIN/1.73M2
EOSINOPHIL # BLD AUTO: 485 CELLS/UL (ref 15–500)
EOSINOPHIL NFR BLD AUTO: 5.7 %
ERYTHROCYTE [DISTWIDTH] IN BLOOD BY AUTOMATED COUNT: 17.7 % (ref 11–15)
EST. AVERAGE GLUCOSE BLD GHB EST-MCNC: 134 MG/DL
EST. AVERAGE GLUCOSE BLD GHB EST-SCNC: 7.4 MMOL/L
FOLATE SERPL-MCNC: 9.5 NG/ML
GLUCOSE SERPL-MCNC: 105 MG/DL (ref 65–99)
HAPTOGLOB SERPL-MCNC: 229 MG/DL (ref 43–212)
HBA1C MFR BLD: 6.3 %
HCT VFR BLD AUTO: 32.3 % (ref 35–45)
HDLC SERPL-MCNC: 54 MG/DL
HGB BLD-MCNC: 8.5 G/DL (ref 11.7–15.5)
IRON SATN MFR SERPL: 6 % (CALC) (ref 16–45)
IRON SERPL-MCNC: 20 MCG/DL (ref 45–160)
LDLC SERPL CALC-MCNC: 34 MG/DL (CALC)
LYMPHOCYTES # BLD AUTO: 1683 CELLS/UL (ref 850–3900)
LYMPHOCYTES NFR BLD AUTO: 19.8 %
MCH RBC QN AUTO: 19.9 PG (ref 27–33)
MCHC RBC AUTO-ENTMCNC: 26.3 G/DL (ref 32–36)
MCV RBC AUTO: 75.6 FL (ref 80–100)
MONOCYTES # BLD AUTO: 536 CELLS/UL (ref 200–950)
MONOCYTES NFR BLD AUTO: 6.3 %
NEUTROPHILS # BLD AUTO: 5763 CELLS/UL (ref 1500–7800)
NEUTROPHILS NFR BLD AUTO: 67.8 %
NONHDLC SERPL-MCNC: 51 MG/DL (CALC)
PLATELET # BLD AUTO: 312 THOUSAND/UL (ref 140–400)
PMV BLD REES-ECKER: 10.7 FL (ref 7.5–12.5)
POTASSIUM SERPL-SCNC: 3.6 MMOL/L (ref 3.5–5.3)
PROT SERPL-MCNC: 6.7 G/DL (ref 6.1–8.1)
RBC # BLD AUTO: 4.27 MILLION/UL (ref 3.8–5.1)
SODIUM SERPL-SCNC: 143 MMOL/L (ref 135–146)
TIBC SERPL-MCNC: 357 MCG/DL (CALC) (ref 250–450)
TRIGL SERPL-MCNC: 85 MG/DL
TSH SERPL-ACNC: 3.65 MIU/L (ref 0.4–4.5)
VIT B12 SERPL-MCNC: 275 PG/ML (ref 200–1100)
WBC # BLD AUTO: 8.5 THOUSAND/UL (ref 3.8–10.8)

## 2025-07-21 NOTE — TELEPHONE ENCOUNTER
Received referral from Dr. Brooks for iron deficiency anemia.  Left a message for patient to schedule NP OV

## 2025-07-24 ENCOUNTER — APPOINTMENT (OUTPATIENT)
Dept: PHARMACY | Facility: HOSPITAL | Age: 75
End: 2025-07-24
Payer: MEDICARE

## 2025-07-31 ENCOUNTER — APPOINTMENT (OUTPATIENT)
Facility: CLINIC | Age: 75
End: 2025-07-31
Payer: MEDICARE

## 2025-07-31 ENCOUNTER — TELEPHONE (OUTPATIENT)
Facility: CLINIC | Age: 75
End: 2025-07-31

## 2025-07-31 VITALS
DIASTOLIC BLOOD PRESSURE: 125 MMHG | WEIGHT: 158 LBS | HEART RATE: 73 BPM | HEIGHT: 62 IN | BODY MASS INDEX: 29.08 KG/M2 | SYSTOLIC BLOOD PRESSURE: 164 MMHG

## 2025-07-31 DIAGNOSIS — D50.9 IRON DEFICIENCY ANEMIA, UNSPECIFIED IRON DEFICIENCY ANEMIA TYPE: Primary | ICD-10-CM

## 2025-07-31 PROCEDURE — 1160F RVW MEDS BY RX/DR IN RCRD: CPT | Performed by: INTERNAL MEDICINE

## 2025-07-31 PROCEDURE — 1159F MED LIST DOCD IN RCRD: CPT | Performed by: INTERNAL MEDICINE

## 2025-07-31 PROCEDURE — 99204 OFFICE O/P NEW MOD 45 MIN: CPT | Performed by: INTERNAL MEDICINE

## 2025-07-31 PROCEDURE — 3077F SYST BP >= 140 MM HG: CPT | Performed by: INTERNAL MEDICINE

## 2025-07-31 PROCEDURE — 3080F DIAST BP >= 90 MM HG: CPT | Performed by: INTERNAL MEDICINE

## 2025-07-31 RX ORDER — POLYETHYLENE GLYCOL 3350, SODIUM CHLORIDE, SODIUM BICARBONATE, POTASSIUM CHLORIDE 420; 11.2; 5.72; 1.48 G/4L; G/4L; G/4L; G/4L
4000 POWDER, FOR SOLUTION ORAL ONCE
Status: SHIPPED | OUTPATIENT
Start: 2025-07-31

## 2025-07-31 NOTE — ASSESSMENT & PLAN NOTE
Overt hematochezia and diarrhea, known diverticular disease; rule out colitis or neoplasm.  May benefit from IV iron infusion and hematology referral.  Benefits risks alternatives of upper or lower endoscopy discussed and informed consent obtained.  Will give her a GoLytely bowel preparation.  Orders:    Colonoscopy Diagnostic; Future    Esophagogastroduodenoscopy (EGD); Future    polyethylene glycol-electrolytes (Nulytely) solution 4,000 mL    Follow Up In Gastroenterology; Future

## 2025-07-31 NOTE — PROGRESS NOTES
[facility] Gastroenterology    ASSESSMENT and PLAN:            Assessment & Plan  Iron deficiency anemia, unspecified iron deficiency anemia type  Overt hematochezia and diarrhea, known diverticular disease; rule out colitis or neoplasm.  May benefit from IV iron infusion and hematology referral.  Benefits risks alternatives of upper or lower endoscopy discussed and informed consent obtained.  Will give her a GoLytely bowel preparation.  Orders:    Colonoscopy Diagnostic; Future    Esophagogastroduodenoscopy (EGD); Future    polyethylene glycol-electrolytes (Nulytely) solution 4,000 mL    Follow Up In Gastroenterology; Future           Abisai Trujillo III, MD, MHA, FACP, FACG, NORAH, AGAF    Attending Physician, Gastroenterology    [facility][1]            Subjective   HISTORY OF PRESENT ILLNESS:     Chief Complaint  New Patient Visit (Iron deficiency anemia/Colon 9/22/14 / EGD done in her 30s.)    History Of Present Illness:    Victorina Adamson is a 75 y.o. female with a significant past medical history of coronary artery disease status post CABG, chronic systolic congestive heart failure, obesity, hypertension, hyperlipidemia, obstructive sleep apnea, who presents for consultation requested by her primary care provider (Jennifer Aguirre, FRANCISCO-CNP) for the evaluation of iron deficiency anemia.  She does have a microcytic anemia with a baseline hemoglobin of about 11 g.  Her most recent hemoglobin this month is down to 8.5 g.  She describes intermittent bright red blood per rectum for several months and complains of chronic diarrhea.  She may have as many as 5 loose stools a day.  She has not been evaluated for this or taking medication for diarrhea.  Denies any abdominal pain.  Last colonoscopy in 2014 was compromised by a poor prep.  No more recent colonoscopy or upper endoscopy.  She is taking a PPI for GERD.     Patient denies any heartburn/GERD,  N/V, dysphagia, odynophagia, abdominal pain,  constipation, hematemesis, melena, or weight loss.    Endoscopy History:    As above    Review of systems:   Review of Systems    I performed a complete 10 point review of systems and it is negative except as noted in HPI or above.      PAST HISTORIES:       Past Medical History:  Problem List[1]  She has a past medical history of Acute upper respiratory infection, unspecified (02/11/2022), Anxiety disorder, unspecified, Chronic tension-type headache, not intractable, Diverticulosis of intestine, part unspecified, without perforation or abscess without bleeding, Encounter for general adult medical examination without abnormal findings (09/20/2021), Encounter for other screening for malignant neoplasm of breast (02/11/2021), Encounter for preprocedural cardiovascular examination, Encounter for screening for malignant neoplasm of colon (02/11/2021), Lumbago with sciatica, right side (09/20/2021), Major depressive disorder, single episode, mild (09/20/2021), Obesity, unspecified (06/22/2020), Other intervertebral disc degeneration, lumbar region (03/15/2022), Other specified health status (06/22/2020), Other unilateral secondary osteoarthritis of hip, Pain in right hip (10/02/2021), Personal history of other diseases of the digestive system, Personal history of other diseases of the digestive system, Personal history of other diseases of the musculoskeletal system and connective tissue (06/08/2021), Personal history of other diseases of the nervous system and sense organs (03/28/2022), Personal history of other infectious and parasitic diseases (06/08/2021), Personal history of other infectious and parasitic diseases (06/18/2020), Personal history of other mental and behavioral disorders (03/15/2022), Personal history of other specified conditions, Personal history of other specified conditions (02/11/2021), Personal history of other specified conditions (06/22/2020),  "Spinal stenosis, lumbosacral region (11/23/2021), Spondylosis without myelopathy or radiculopathy, lumbar region (11/30/2021), and Unspecified fall, subsequent encounter (09/20/2021).    Past Surgical History:  She has a past surgical history that includes Other surgical history (06/17/2020); Other surgical history (06/17/2020); Other surgical history (06/17/2020); Other surgical history (05/19/2022); Other surgical history (05/26/2022); Other surgical history (06/01/2022); Other surgical history (03/15/2022); Other surgical history (02/11/2021); Other surgical history (10/18/2022); Other surgical history (09/20/2021); and IR injection joint (10/13/2021).      Social History:  She reports that she has never smoked. She has never used smokeless tobacco. She reports current alcohol use. She reports that she does not use drugs.    Family History:  No known family history of GI disease, specifically denies any family history of pancreatitis, Crohn's, colon cancer, gastroesophageal cancer, or ulcerative colitis.    Family History[2]     Allergies:  Gabapentin, Hydrocodone-acetaminophen, Influenza virus vaccines, Lisinopril, and Oxycodone      Objective   OBJECTIVE:       Last Recorded Vitals:  Vitals:    07/31/25 1028   BP: (!) 164/125   Pulse: 73   Weight: 71.7 kg (158 lb)   Height: 1.575 m (5' 2\")     BP (!) 164/125   Pulse 73   Ht 1.575 m (5' 2\")   Wt 71.7 kg (158 lb)   BMI 28.90 kg/m²      Physical Exam:    Physical Exam  Physical Exam:  Pleasant obese female in no apparent distress  Alert and oriented x 3   HEENT: Normocephalic atraumatic.  Extraocular movements intact.  No scleral icterus.  Oropharynx slightly dry.  Clear no exudate.  Neck: Supple, full range of movement  CV: RRR, no murmurs appreciated  Lungs: CTA bilaterally  Abd: soft, obese, normal active bowel sounds, NT, ND   Ext: no lower extremity edema, cyanosis or clubbing  Skin: No jaundice or rashes    Home Medications:  Prior to Admission " medications   Medication Sig Start Date End Date Taking? Authorizing Provider   acetaminophen (Tylenol) 500 mg tablet Take 2 tablets (1,000 mg) by mouth every 6 hours if needed for mild pain (1 - 3).    Historical Provider, MD   albuterol 90 mcg/actuation inhaler Inhale 2 puffs every 4 hours if needed for wheezing or shortness of breath (inhale 2 puffs into the lungs every 4 to 6 hours as needed). 1/1/24   Sean Romero MD   ammonium lactate (Lac-Hydrin) 12 % lotion Apply topically if needed for dry skin. 12/2/24 12/2/25  ACACIA Orellana   apixaban (Eliquis) 5 mg tablet Take 1 tablet (5 mg) by mouth 2 times a day. 1/9/25 1/9/26  ACACIA Alaniz   aspirin 81 mg EC tablet Take 1 tablet (81 mg) by mouth once daily.    Historical Provider, MD   atorvastatin (Lipitor) 80 mg tablet TAKE 1 TABLET BY MOUTH AT BEDTIME 7/19/24   ACACIA Williamson   blood glucose control, low (True Metrix Level 1) solution Apply 1 each topically see administration instructions. 7/24/23   Sean Persaud,    carvedilol (Coreg) 6.25 mg tablet Take 1 tablet (6.25 mg) by mouth 2 times daily (morning and late afternoon). 6/13/25   ACACIA Alaniz   DropSafe Alcohol Prep Pads pads, medicated USE AS DIRECTED 10/6/23   Amy Cummings DO   empagliflozin (Jardiance) 25 mg tablet Take 1 tablet (25 mg) by mouth once daily. 1/9/25 1/9/26  ACACIA Alaniz   ezetimibe (Zetia) 10 mg tablet Take 1 tablet (10 mg) by mouth once daily. 10/23/24 10/18/25  ACACIA Alaniz   furosemide (Lasix) 20 mg tablet Take 1 tablet (20 mg) by mouth once daily. 12/30/24 7/18/25  ACACIA Alaniz   glimepiride (AmaryL) 4 mg tablet Take 1 tablet (4 mg) by mouth once daily in the morning. Take before meals. 8/30/24 8/30/25  FRANCISCO Orellana-CNP   HYDROcodone-acetaminophen (Norco) 5-325 mg tablet Take 2 tablets by mouth every 6 hours if needed for pain. 11/19/24   Historical Provider, MD   levothyroxine  (Synthroid, Levoxyl) 50 mcg tablet Take 1 tablet (50 mcg) by mouth once daily. 5/30/24 7/18/25  ACACIA Orellana   losartan (Cozaar) 25 mg tablet Take 1 tablet (25 mg) by mouth once daily. 10/23/24 7/20/25  ACACIA Alaniz   meclizine (Antivert) 12.5 mg tablet TAKE 2 TABLETS BY MOUTH THREE TIMES DAILY AS NEEDED FOR DIZZINESS 7/16/25   ACACIA Orellana   metFORMIN (Glucophage) 1,000 mg tablet Take 1 tablet (1,000 mg) by mouth 2 times a day. 7/16/25   ACACIA Orellana   pantoprazole (ProtoNix) 40 mg EC tablet Take 1 tablet by mouth once daily 5/2/25   ACACIA Orellana   pregabalin (Lyrica) 50 mg capsule Take 1 capsule (50 mg) by mouth 2 times a day. 10/9/24 7/18/25  ACACIA Orellana   sacubitriL-valsartan (Entresto) 24-26 mg tablet Take 1 tablet by mouth 2 times a day. 1/9/25   ACACIA Alaniz   sertraline (Zoloft) 100 mg tablet Take 1 tablet (100 mg) by mouth once daily. 6/2/25 5/28/26  ACACIA Orellana   spironolactone (Aldactone) 25 mg tablet Take 0.5 tablets (12.5 mg) by mouth once daily. 6/13/25 9/11/25  ACACIA Alaniz   triamcinolone (Kenalog) 0.1 % cream Apply topically 2 times a day. Apply to affected area 1-2 times daily as needed. Avoid face and groin. 12/2/24 12/2/25  ACACIA Orellana   True Metrix Air Glucose Meter kit  7/25/23   Historical Provider, MD   True Metrix Glucose Test Strip strip USE AS DIRECTED 10/6/23   Amy Cummings, DO   TRUEplus Lancets 33 gauge misc USE AS DIRECTED 10/6/23   Amy Cummings, DO         Relevant Results Recent labs reviewed in the EMR.    Lab Results   Component Value Date/Time    WBC 8.5 07/18/2025 1102    HGB 8.5 (L) 07/18/2025 1102    HGB 9.4 (L) 03/11/2025 0827    HGB 9.2 (L) 09/16/2024 0951    HGB 11.0 (L) 04/23/2024 0946    HGB 11.0 (L) 01/01/2024 1705    MCV 75.6 (L) 07/18/2025 1102     07/18/2025 1102     (H) 03/11/2025 0827      09/16/2024 0951     04/23/2024 0946     01/01/2024 1705    IRON 20 (L) 07/18/2025 1102    TIBC 357 07/18/2025 1102    IRONSAT 6 (L) 07/18/2025 1102    FERRITIN 21 02/20/2023 2245    VSRZHSWT25 275 07/18/2025 1102    FOLATE 9.5 07/18/2025 1102       Lab Results   Component Value Date/Time     07/18/2025 1102    K 3.6 07/18/2025 1102     07/18/2025 1102    BUN 11 07/18/2025 1102    CREATININE 0.80 07/18/2025 1102    CREATININE 0.75 03/11/2025 0827       Lab Results   Component Value Date/Time    BILITOT 0.5 07/18/2025 1102    BILITOT 0.4 03/11/2025 0827    BILITOT 0.4 04/23/2024 0946    BILITOT 0.4 01/01/2024 1705    BILITOT 0.4 12/08/2023 1222    ALKPHOS 68 07/18/2025 1102    ALKPHOS 68 03/11/2025 0827    ALKPHOS 69 04/23/2024 0946    ALKPHOS 84 01/01/2024 1705    ALKPHOS 81 12/08/2023 1222    AST 13 07/18/2025 1102    AST 16 03/11/2025 0827    AST 16 04/23/2024 0946    AST 14 01/01/2024 1705    AST 17 12/08/2023 1222    ALT 5 (L) 07/18/2025 1102    ALT 6 03/11/2025 0827    ALT 23 04/23/2024 0946    ALT 10 01/01/2024 1705    ALT 13 12/08/2023 1222         Radiology: Imaging reviewed in the EMR.  Imaging  No results found.    Cardiology, Vascular, and Other Imaging  No other imaging results found for the past 7 days      === 04/17/25 ===    CT CHEST ABDOMEN PELVIS WO CONTRAST    - Impression -  Limited examination secondary to lack of intravenous contrast. If  symptoms persist further evaluation should be obtained with PET-CT.  1. Scattered ground-glass opacities of the left-greater-than-right  lung bases with interlobular septal thickening and small bilateral  pleural effusions likely representing pulmonary edema, given the mild  cardiomegaly. Further evaluation with echocardiogram recommended.  2. No acute process in the abdomen or pelvis.  3. Stable compression deformity of the L5 vertebral body, please  refer to MRI dated 09/19/2024.    I personally reviewed the image(s) / study and I  agree with the  findings as stated by Emmanuel White MD. This study was interpreted at  [facility], [place], [place].    MACRO:  None    Signed by: Isaiah Nieto 4/19/2025 9:49 AM  Dictation workstation:   [id number]           [1]   Patient Active Problem List  Diagnosis    Coronary artery disease involving native coronary artery of native heart without angina pectoris    Cardiomyopathy    Type 2 diabetes mellitus with other specified complication    Drug-induced hypoglycemia    Primary hypertension    Mixed hyperlipidemia    Hypothyroidism (acquired)    Iron deficiency anemia    Mild acid reflux    SHARON (obstructive sleep apnea)    Open wound of chest wall, uncomplicated    Atrial fibrillation (Multi)    Physical deconditioning    Primary osteoarthritis of right hip    Recurrent major depressive disorder    S/P CABG x 4    History of total hip replacement    Cobalamin deficiency    Benign paroxysmal positional vertigo    Pulmonary congestion    Productive cough    Dyspnea on exertion    Acute combined systolic and diastolic congestive heart failure    Hypomagnesemia    Anxiety    Atherosclerosis of autologous vein coronary artery bypass graft    Acute exacerbation of chronic obstructive pulmonary disease (Multi)    Acute postoperative pain    Anemia    Candidiasis of vagina    Carpal tunnel syndrome    Chest pain    Chronic systolic congestive heart failure    Chronic tension headache    Contact with and (suspected) exposure to covid-19    Diverticular disease    Dysphagia    Fatigue    Food insecurity    Hypokalemia    Herpes zoster    Insomnia    Mixed anxiety depressive disorder    Osteoporosis    Other underimmunization status    Pneumonia    Sleep-disordered breathing    Subacute vaginitis    Unvaccinated for covid-19    Hypoglycemia    Diabetes mellitus (Multi)    Diarrhea    Seasonal allergic rhinitis    Medicare annual wellness visit, subsequent    Other thrombophilia    BMI 29.0-29.9,adult     Heart failure with improved ejection fraction (HFimpEF)    Dyslipidemia    Preoperative cardiovascular examination    Acute on chronic diastolic (congestive) heart failure    Moderate major depression (Multi)    Type 2 diabetes mellitus with stage 3a chronic kidney disease, without long-term current use of insulin (Multi)    Unintentional weight loss   [2]   Family History  Problem Relation Name Age of Onset    Hypertension Mother          benign essential    Arthritis Mother      Coronary artery disease Mother      Depression Mother      Diabetes Mother      Other (substance abuse) Mother      Lung cancer Father      Other (substance abuse) Father      Diabetes Sister      Hypertension Brother          benign essential    Lung cancer Brother

## 2025-07-31 NOTE — TELEPHONE ENCOUNTER
Double in Endo - Nulytely prep with Dr. Trujillo  Pt will call to schedule once she finds out her 's available.   Packet/nulytely prep given to patient at visit.

## 2025-08-06 ENCOUNTER — APPOINTMENT (OUTPATIENT)
Dept: PHARMACY | Facility: HOSPITAL | Age: 75
End: 2025-08-06
Payer: MEDICARE

## 2025-08-06 PROCEDURE — RXMED WILLOW AMBULATORY MEDICATION CHARGE

## 2025-08-07 ENCOUNTER — PHARMACY VISIT (OUTPATIENT)
Dept: PHARMACY | Facility: CLINIC | Age: 75
End: 2025-08-07
Payer: COMMERCIAL

## 2025-08-14 ENCOUNTER — APPOINTMENT (OUTPATIENT)
Dept: PRIMARY CARE | Facility: CLINIC | Age: 75
End: 2025-08-14
Payer: MEDICARE

## 2025-08-14 VITALS
WEIGHT: 164 LBS | SYSTOLIC BLOOD PRESSURE: 136 MMHG | HEIGHT: 62 IN | OXYGEN SATURATION: 95 % | DIASTOLIC BLOOD PRESSURE: 67 MMHG | BODY MASS INDEX: 30.18 KG/M2 | HEART RATE: 69 BPM

## 2025-08-14 DIAGNOSIS — N32.81 OVERACTIVE BLADDER: ICD-10-CM

## 2025-08-14 DIAGNOSIS — E11.69 TYPE 2 DIABETES MELLITUS WITH OTHER SPECIFIED COMPLICATION, WITHOUT LONG-TERM CURRENT USE OF INSULIN: ICD-10-CM

## 2025-08-14 DIAGNOSIS — E11.22 TYPE 2 DIABETES MELLITUS WITH STAGE 3A CHRONIC KIDNEY DISEASE, WITHOUT LONG-TERM CURRENT USE OF INSULIN (MULTI): ICD-10-CM

## 2025-08-14 DIAGNOSIS — B37.31 VAGINAL YEAST INFECTION: ICD-10-CM

## 2025-08-14 DIAGNOSIS — N18.31 TYPE 2 DIABETES MELLITUS WITH STAGE 3A CHRONIC KIDNEY DISEASE, WITHOUT LONG-TERM CURRENT USE OF INSULIN (MULTI): ICD-10-CM

## 2025-08-14 DIAGNOSIS — I50.22 CHRONIC SYSTOLIC HEART FAILURE: Primary | ICD-10-CM

## 2025-08-14 DIAGNOSIS — G62.9 NEUROPATHY: ICD-10-CM

## 2025-08-14 DIAGNOSIS — E03.8 OTHER SPECIFIED HYPOTHYROIDISM: ICD-10-CM

## 2025-08-14 DIAGNOSIS — R63.4 UNINTENTIONAL WEIGHT LOSS: ICD-10-CM

## 2025-08-14 DIAGNOSIS — D50.8 OTHER IRON DEFICIENCY ANEMIA: ICD-10-CM

## 2025-08-14 DIAGNOSIS — I10 PRIMARY HYPERTENSION: ICD-10-CM

## 2025-08-14 DIAGNOSIS — F32.1 MODERATE MAJOR DEPRESSION (MULTI): ICD-10-CM

## 2025-08-14 PROBLEM — Z28.39 OTHER UNDERIMMUNIZATION STATUS: Status: RESOLVED | Noted: 2022-12-02 | Resolved: 2025-08-14

## 2025-08-14 PROBLEM — E66.01 OBESITY, MORBID (MULTI): Status: RESOLVED | Noted: 2025-08-14 | Resolved: 2025-08-14

## 2025-08-14 PROBLEM — Z20.822 CONTACT WITH AND (SUSPECTED) EXPOSURE TO COVID-19: Status: RESOLVED | Noted: 2023-02-23 | Resolved: 2025-08-14

## 2025-08-14 PROBLEM — B02.9 HERPES ZOSTER: Status: RESOLVED | Noted: 2024-01-10 | Resolved: 2025-08-14

## 2025-08-14 PROBLEM — Z28.310 UNVACCINATED FOR COVID-19: Status: RESOLVED | Noted: 2022-12-02 | Resolved: 2025-08-14

## 2025-08-14 PROBLEM — E66.01 OBESITY, MORBID (MULTI): Status: ACTIVE | Noted: 2025-08-14

## 2025-08-14 PROCEDURE — 3075F SYST BP GE 130 - 139MM HG: CPT

## 2025-08-14 PROCEDURE — G2211 COMPLEX E/M VISIT ADD ON: HCPCS

## 2025-08-14 PROCEDURE — 1036F TOBACCO NON-USER: CPT

## 2025-08-14 PROCEDURE — 1159F MED LIST DOCD IN RCRD: CPT

## 2025-08-14 PROCEDURE — 3078F DIAST BP <80 MM HG: CPT

## 2025-08-14 PROCEDURE — 99214 OFFICE O/P EST MOD 30 MIN: CPT

## 2025-08-14 PROCEDURE — 1160F RVW MEDS BY RX/DR IN RCRD: CPT

## 2025-08-14 RX ORDER — GLIMEPIRIDE 4 MG/1
4 TABLET ORAL
Qty: 90 TABLET | Refills: 3 | Status: SHIPPED | OUTPATIENT
Start: 2025-08-14 | End: 2026-08-14

## 2025-08-14 RX ORDER — PREGABALIN 50 MG/1
50 CAPSULE ORAL 2 TIMES DAILY
Qty: 180 CAPSULE | Refills: 1 | Status: SHIPPED | OUTPATIENT
Start: 2025-08-14 | End: 2026-02-10

## 2025-08-14 RX ORDER — OXYBUTYNIN CHLORIDE 5 MG/1
5 TABLET, EXTENDED RELEASE ORAL DAILY
Qty: 30 TABLET | Refills: 11 | Status: SHIPPED | OUTPATIENT
Start: 2025-08-14 | End: 2026-08-14

## 2025-08-14 RX ORDER — TOLNAFTATE 1 %
1 AEROSOL, POWDER (GRAM) TOPICAL DAILY
Qty: 21 G | Refills: 0 | Status: SHIPPED | OUTPATIENT
Start: 2025-08-14 | End: 2025-08-17

## 2025-08-14 RX ORDER — LEVOTHYROXINE SODIUM 50 UG/1
50 TABLET ORAL DAILY
Qty: 90 TABLET | Refills: 3 | Status: SHIPPED | OUTPATIENT
Start: 2025-08-14 | End: 2026-08-14

## 2025-08-14 ASSESSMENT — ENCOUNTER SYMPTOMS
DIARRHEA: 1
EYES NEGATIVE: 1
ACTIVITY CHANGE: 1
UNEXPECTED WEIGHT CHANGE: 1
MUSCULOSKELETAL NEGATIVE: 1
RESPIRATORY NEGATIVE: 1
ALLERGIC/IMMUNOLOGIC NEGATIVE: 1
NEUROLOGICAL NEGATIVE: 1
ENDOCRINE NEGATIVE: 1
CARDIOVASCULAR NEGATIVE: 1
DEPRESSION: 0
HEMATOLOGIC/LYMPHATIC NEGATIVE: 1
OCCASIONAL FEELINGS OF UNSTEADINESS: 1
PSYCHIATRIC NEGATIVE: 1
LOSS OF SENSATION IN FEET: 0

## 2025-08-16 DIAGNOSIS — E78.5 DYSLIPIDEMIA: ICD-10-CM

## 2025-08-16 DIAGNOSIS — I25.10 ARTERIOSCLEROSIS OF CORONARY ARTERY: ICD-10-CM

## 2025-08-16 DIAGNOSIS — Z95.1 S/P CABG X 4: ICD-10-CM

## 2025-08-19 RX ORDER — ATORVASTATIN CALCIUM 80 MG/1
80 TABLET, FILM COATED ORAL NIGHTLY
Qty: 90 TABLET | Refills: 3 | Status: SHIPPED | OUTPATIENT
Start: 2025-08-19

## 2025-08-21 ENCOUNTER — APPOINTMENT (OUTPATIENT)
Dept: PHARMACY | Facility: HOSPITAL | Age: 75
End: 2025-08-21
Payer: MEDICARE

## 2025-08-21 DIAGNOSIS — I48.0 PAROXYSMAL ATRIAL FIBRILLATION (MULTI): ICD-10-CM

## 2025-08-21 DIAGNOSIS — I50.32 HEART FAILURE WITH IMPROVED EJECTION FRACTION (HFIMPEF): ICD-10-CM

## 2025-12-11 ENCOUNTER — APPOINTMENT (OUTPATIENT)
Dept: PHARMACY | Facility: HOSPITAL | Age: 75
End: 2025-12-11
Payer: MEDICARE

## 2026-02-09 ENCOUNTER — APPOINTMENT (OUTPATIENT)
Dept: PRIMARY CARE | Facility: CLINIC | Age: 76
End: 2026-02-09
Payer: MEDICARE